# Patient Record
Sex: MALE | Race: BLACK OR AFRICAN AMERICAN | NOT HISPANIC OR LATINO | Employment: UNEMPLOYED | URBAN - METROPOLITAN AREA
[De-identification: names, ages, dates, MRNs, and addresses within clinical notes are randomized per-mention and may not be internally consistent; named-entity substitution may affect disease eponyms.]

---

## 2020-11-24 ENCOUNTER — OFFICE VISIT (OUTPATIENT)
Dept: FAMILY MEDICINE CLINIC | Facility: CLINIC | Age: 50
End: 2020-11-24
Payer: COMMERCIAL

## 2020-11-24 VITALS
WEIGHT: 164 LBS | HEART RATE: 103 BPM | HEIGHT: 63 IN | BODY MASS INDEX: 29.06 KG/M2 | SYSTOLIC BLOOD PRESSURE: 140 MMHG | DIASTOLIC BLOOD PRESSURE: 96 MMHG | OXYGEN SATURATION: 98 % | TEMPERATURE: 97.4 F

## 2020-11-24 DIAGNOSIS — E03.9 HYPOTHYROIDISM, UNSPECIFIED TYPE: ICD-10-CM

## 2020-11-24 DIAGNOSIS — E78.5 HYPERLIPIDEMIA, UNSPECIFIED HYPERLIPIDEMIA TYPE: ICD-10-CM

## 2020-11-24 DIAGNOSIS — I10 ESSENTIAL HYPERTENSION: Primary | ICD-10-CM

## 2020-11-24 DIAGNOSIS — E87.6 POTASSIUM (K) DEFICIENCY: ICD-10-CM

## 2020-11-24 PROCEDURE — 3080F DIAST BP >= 90 MM HG: CPT | Performed by: FAMILY MEDICINE

## 2020-11-24 PROCEDURE — 3077F SYST BP >= 140 MM HG: CPT | Performed by: FAMILY MEDICINE

## 2020-11-24 PROCEDURE — 3008F BODY MASS INDEX DOCD: CPT | Performed by: FAMILY MEDICINE

## 2020-11-24 PROCEDURE — 3725F SCREEN DEPRESSION PERFORMED: CPT | Performed by: FAMILY MEDICINE

## 2020-11-24 PROCEDURE — 1036F TOBACCO NON-USER: CPT | Performed by: FAMILY MEDICINE

## 2020-11-24 PROCEDURE — 99213 OFFICE O/P EST LOW 20 MIN: CPT | Performed by: FAMILY MEDICINE

## 2020-11-24 RX ORDER — POTASSIUM CHLORIDE 20 MEQ/1
20 TABLET, EXTENDED RELEASE ORAL 2 TIMES DAILY
Qty: 90 TABLET | Refills: 1 | Status: SHIPPED | OUTPATIENT
Start: 2020-11-24 | End: 2021-05-25 | Stop reason: SDUPTHER

## 2020-11-24 RX ORDER — AMLODIPINE BESYLATE 5 MG/1
5 TABLET ORAL DAILY
COMMUNITY
End: 2020-11-24 | Stop reason: SDUPTHER

## 2020-11-24 RX ORDER — POTASSIUM CHLORIDE 20 MEQ/1
20 TABLET, EXTENDED RELEASE ORAL 2 TIMES DAILY
COMMUNITY
End: 2020-11-24 | Stop reason: SDUPTHER

## 2020-11-24 RX ORDER — HYDROCHLOROTHIAZIDE 25 MG/1
25 TABLET ORAL DAILY
COMMUNITY
End: 2020-11-24 | Stop reason: SDUPTHER

## 2020-11-24 RX ORDER — LEVOTHYROXINE SODIUM 0.03 MG/1
25 TABLET ORAL DAILY
Qty: 90 TABLET | Refills: 1 | Status: SHIPPED | OUTPATIENT
Start: 2020-11-24 | End: 2021-05-25 | Stop reason: SDUPTHER

## 2020-11-24 RX ORDER — HYDROCHLOROTHIAZIDE 25 MG/1
25 TABLET ORAL DAILY
Qty: 90 TABLET | Refills: 1 | Status: SHIPPED | OUTPATIENT
Start: 2020-11-24 | End: 2021-05-25 | Stop reason: SDUPTHER

## 2020-11-24 RX ORDER — ATORVASTATIN CALCIUM 20 MG/1
20 TABLET, FILM COATED ORAL DAILY
Qty: 90 TABLET | Refills: 1 | Status: SHIPPED | OUTPATIENT
Start: 2020-11-24 | End: 2021-05-25 | Stop reason: SDUPTHER

## 2020-11-24 RX ORDER — AMLODIPINE BESYLATE 5 MG/1
5 TABLET ORAL DAILY
Qty: 90 TABLET | Refills: 1 | Status: SHIPPED | OUTPATIENT
Start: 2020-11-24 | End: 2021-05-25

## 2020-11-24 RX ORDER — ATORVASTATIN CALCIUM 20 MG/1
20 TABLET, FILM COATED ORAL DAILY
COMMUNITY
End: 2020-11-24 | Stop reason: SDUPTHER

## 2020-11-24 RX ORDER — LEVOTHYROXINE SODIUM 0.03 MG/1
25 TABLET ORAL DAILY
COMMUNITY
End: 2020-11-24 | Stop reason: SDUPTHER

## 2021-05-25 ENCOUNTER — OFFICE VISIT (OUTPATIENT)
Dept: FAMILY MEDICINE CLINIC | Facility: CLINIC | Age: 51
End: 2021-05-25
Payer: COMMERCIAL

## 2021-05-25 VITALS
OXYGEN SATURATION: 97 % | BODY MASS INDEX: 27.11 KG/M2 | HEART RATE: 107 BPM | SYSTOLIC BLOOD PRESSURE: 170 MMHG | DIASTOLIC BLOOD PRESSURE: 100 MMHG | WEIGHT: 153 LBS | TEMPERATURE: 97.9 F | HEIGHT: 63 IN

## 2021-05-25 DIAGNOSIS — I10 ESSENTIAL HYPERTENSION: ICD-10-CM

## 2021-05-25 DIAGNOSIS — E78.5 HYPERLIPIDEMIA, UNSPECIFIED HYPERLIPIDEMIA TYPE: ICD-10-CM

## 2021-05-25 DIAGNOSIS — E87.6 POTASSIUM (K) DEFICIENCY: ICD-10-CM

## 2021-05-25 DIAGNOSIS — E03.9 HYPOTHYROIDISM, UNSPECIFIED TYPE: ICD-10-CM

## 2021-05-25 PROCEDURE — 99213 OFFICE O/P EST LOW 20 MIN: CPT | Performed by: FAMILY MEDICINE

## 2021-05-25 RX ORDER — HYDROCHLOROTHIAZIDE 25 MG/1
25 TABLET ORAL DAILY
Qty: 90 TABLET | Refills: 1 | Status: SHIPPED | OUTPATIENT
Start: 2021-05-25 | End: 2021-08-02 | Stop reason: ALTCHOICE

## 2021-05-25 RX ORDER — AMLODIPINE BESYLATE 10 MG/1
10 TABLET ORAL DAILY
Qty: 90 TABLET | Refills: 2 | Status: SHIPPED | OUTPATIENT
Start: 2021-05-25 | End: 2021-08-02 | Stop reason: SDUPTHER

## 2021-05-25 RX ORDER — ATORVASTATIN CALCIUM 20 MG/1
20 TABLET, FILM COATED ORAL DAILY
Qty: 90 TABLET | Refills: 1 | Status: SHIPPED | OUTPATIENT
Start: 2021-05-25 | End: 2021-08-02 | Stop reason: SDUPTHER

## 2021-05-25 RX ORDER — LEVOTHYROXINE SODIUM 0.03 MG/1
25 TABLET ORAL DAILY
Qty: 90 TABLET | Refills: 1 | Status: SHIPPED | OUTPATIENT
Start: 2021-05-25 | End: 2021-08-02 | Stop reason: SDUPTHER

## 2021-05-25 RX ORDER — POTASSIUM CHLORIDE 20 MEQ/1
20 TABLET, EXTENDED RELEASE ORAL 2 TIMES DAILY
Qty: 90 TABLET | Refills: 1 | Status: SHIPPED | OUTPATIENT
Start: 2021-05-25 | End: 2021-08-16

## 2021-05-25 NOTE — ASSESSMENT & PLAN NOTE
Latest Potassium 3 2   Currently taking K+ replacement    - Refill K+  - Recheck BMP    RTC in 2 weeks

## 2021-05-25 NOTE — ASSESSMENT & PLAN NOTE
BP elevated today (170/100)  Reports compliance with medications  Denies any chest pain or blurry vision   Checks BP at home with numbers in the 309T systolic     - Increase norvasc to 10mg daily  - Refill HCTZ 25  - Recommend decreased salt intake  - Keep BP log

## 2021-05-25 NOTE — PROGRESS NOTES
Family Medicine Follow-Up Office Visit  Shwetha Camp 44 y o  male   MRN: [id number] : 1970  ENCOUNTER: 2021 10:26 AM    Assessment and Plan   Essential hypertension  BP elevated today (170/100)  Reports compliance with medications  Denies any chest pain or blurry vision  Checks BP at home with numbers in the 257A systolic     - Increase norvasc to 10mg daily  - Refill HCTZ 25  - Recommend decreased salt intake  - Keep BP log      Hyperlipidemia  Most recent lipid panel WNL except for slight decrease in HDL  Currently taking Lipitor 20mg daily  - Refill Lipitor 20mg  - Recheck lipid panel  - Discussed healthier diet    Hypothyroidism  Latest TSH within normal range  Compliant with levothyroxine 25  - Refill Levothyroxine 25      Potassium (K) deficiency  Latest Potassium 3 2  Currently taking K+ replacement    - Refill K+  - Recheck BMP    RTC in 2 weeks    Nutrition Assessment and Intervention:     Nutrition patient handout reviewed with patient      Physical Activity Assessment and Intervention:      Other interventions: Recommend 150 minutes of moderate intensity exercise weekly      Chief Complaint     Chief Complaint   Patient presents with    Follow-up       History of Present Illness   Shwetha Camp is a 44y o -year-old male who presents today for a follow up  States that he is running out of his medications  He has been feeling well overall, but admits to consuming a high salt diet  He has been checking his blood pressures at home and they have been around 503 systolic  He has no new complaints today  Review of Systems   Review of Systems   Constitutional: Negative for fatigue and fever  HENT: Negative for congestion, rhinorrhea, sneezing and sore throat  Respiratory: Negative for cough, chest tightness, shortness of breath and wheezing  Cardiovascular: Negative for chest pain and palpitations     Gastrointestinal: Negative for abdominal pain, constipation, diarrhea, nausea and vomiting  Genitourinary: Negative for difficulty urinating  Musculoskeletal: Negative for arthralgias, gait problem, myalgias and neck pain  Skin: Negative for rash  Neurological: Negative for dizziness, weakness, numbness and headaches  Active Problem List     Patient Active Problem List   Diagnosis    Essential hypertension    Hyperlipidemia    Hypothyroidism    Potassium (K) deficiency       Past Medical History, Past Surgical History, Family History, and Social History were reviewed and updated today as appropriate  Objective   /100   Pulse (!) 107   Temp 97 9 °F (36 6 °C)   Ht 5' 3" (1 6 m)   Wt 69 4 kg (153 lb)   SpO2 97%   BMI 27 10 kg/m²     Physical Exam  Vitals signs reviewed  Constitutional:       General: He is not in acute distress  Appearance: He is well-developed  He is not toxic-appearing or diaphoretic  HENT:      Head: Normocephalic and atraumatic  Eyes:      General: No scleral icterus  Right eye: No discharge  Left eye: No discharge  Conjunctiva/sclera: Conjunctivae normal    Cardiovascular:      Rate and Rhythm: Normal rate and regular rhythm  Heart sounds: Normal heart sounds  No murmur  Pulmonary:      Effort: Pulmonary effort is normal  No respiratory distress  Breath sounds: Normal breath sounds  No wheezing  Abdominal:      General: Bowel sounds are normal  There is no distension  Palpations: Abdomen is soft  Tenderness: There is no abdominal tenderness  Musculoskeletal: Normal range of motion  General: No tenderness  Skin:     General: Skin is warm  Findings: No erythema or rash  Neurological:      Mental Status: He is alert     Psychiatric:         Behavior: Behavior normal          Pertinent Laboratory/Diagnostic Studies:  No results found for: GLUCOSE, BUN, CREATININE, CALCIUM, NA, K, CO2, CL  No results found for: ALT, AST, GGT, ALKPHOS, BILITOT    No results found for: WBC, HGB, HCT, MCV, PLT    No results found for: TSH    No results found for: CHOL  No results found for: TRIG  No results found for: HDL  No results found for: LDLCALC  No results found for: HGBA1C    No results found for this or any previous visit  Orders Placed This Encounter   Procedures    Basic metabolic panel    Lipid Panel with Direct LDL reflex         Current Medications     Current Outpatient Medications   Medication Sig Dispense Refill    amLODIPine (NORVASC) 10 mg tablet Take 1 tablet (10 mg total) by mouth daily 90 tablet 2    atorvastatin (LIPITOR) 20 mg tablet Take 1 tablet (20 mg total) by mouth daily 90 tablet 1    hydrochlorothiazide (HYDRODIURIL) 25 mg tablet Take 1 tablet (25 mg total) by mouth daily 90 tablet 1    levothyroxine 25 mcg tablet Take 1 tablet (25 mcg total) by mouth daily 90 tablet 1    potassium chloride (K-DUR,KLOR-CON) 20 mEq tablet Take 1 tablet (20 mEq total) by mouth 2 (two) times a day 90 tablet 1     No current facility-administered medications for this visit          ALLERGIES:  No Known Allergies    Health Maintenance     Health Maintenance   Topic Date Due    COVID-19 Vaccine (1) Never done    HIV Screening  Never done    BMI: Followup Plan  Never done    Annual Physical  Never done    DTaP,Tdap,and Td Vaccines (1 - Tdap) Never done    Colorectal Cancer Screening  Never done    Depression Screening PHQ  11/24/2021    BMI: Adult  11/24/2021    Influenza Vaccine (Season Ended) 09/01/2021    Pneumococcal Vaccine: Pediatrics (0 to 5 Years) and At-Risk Patients (6 to 59 Years)  Aged Out    HIB Vaccine  Aged Out    Hepatitis B Vaccine  Aged Out    IPV Vaccine  Aged Out    Hepatitis A Vaccine  Aged Out    Meningococcal ACWY Vaccine  Aged Out    HPV Vaccine  Aged [employer] History   Administered Date(s) Administered    INFLUENZA 01/22/2018, 01/29/2019         Adam Swift MD   [address]  5/25/2021  10:26 AM    Parts of this note were dictated using M*[employer] dictation software and may have sounds-like errors due to variation in pronunciation

## 2021-05-25 NOTE — ASSESSMENT & PLAN NOTE
Most recent lipid panel WNL except for slight decrease in HDL  Currently taking Lipitor 20mg daily      - Refill Lipitor 20mg  - Recheck lipid panel  - Discussed healthier diet

## 2021-06-04 ENCOUNTER — LAB (OUTPATIENT)
Dept: LAB | Facility: CLINIC | Age: 51
End: 2021-06-04
Payer: COMMERCIAL

## 2021-06-04 DIAGNOSIS — I10 ESSENTIAL HYPERTENSION: ICD-10-CM

## 2021-06-04 DIAGNOSIS — E78.5 HYPERLIPIDEMIA, UNSPECIFIED HYPERLIPIDEMIA TYPE: ICD-10-CM

## 2021-06-04 DIAGNOSIS — E03.9 HYPOTHYROIDISM, UNSPECIFIED TYPE: ICD-10-CM

## 2021-06-04 LAB
ANION GAP SERPL CALCULATED.3IONS-SCNC: 7 MMOL/L (ref 4–13)
BUN SERPL-MCNC: 15 MG/DL (ref 5–25)
CALCIUM SERPL-MCNC: 9.7 MG/DL (ref 8.3–10.1)
CHLORIDE SERPL-SCNC: 102 MMOL/L (ref 100–108)
CHOLEST SERPL-MCNC: 117 MG/DL (ref 50–200)
CO2 SERPL-SCNC: 33 MMOL/L (ref 21–32)
CREAT SERPL-MCNC: 1.08 MG/DL (ref 0.6–1.3)
GFR SERPL CREATININE-BSD FRML MDRD: 91 ML/MIN/1.73SQ M
GLUCOSE P FAST SERPL-MCNC: 104 MG/DL (ref 65–99)
HDLC SERPL-MCNC: 42 MG/DL
LDLC SERPL CALC-MCNC: 62 MG/DL (ref 0–100)
POTASSIUM SERPL-SCNC: 2.9 MMOL/L (ref 3.5–5.3)
SODIUM SERPL-SCNC: 142 MMOL/L (ref 136–145)
TRIGL SERPL-MCNC: 63 MG/DL
TSH SERPL DL<=0.05 MIU/L-ACNC: 1.68 UIU/ML (ref 0.36–3.74)

## 2021-06-04 PROCEDURE — 80061 LIPID PANEL: CPT

## 2021-06-04 PROCEDURE — 36415 COLL VENOUS BLD VENIPUNCTURE: CPT

## 2021-06-04 PROCEDURE — 84443 ASSAY THYROID STIM HORMONE: CPT

## 2021-06-04 PROCEDURE — 80048 BASIC METABOLIC PNL TOTAL CA: CPT

## 2021-06-08 ENCOUNTER — OFFICE VISIT (OUTPATIENT)
Dept: FAMILY MEDICINE CLINIC | Facility: CLINIC | Age: 51
End: 2021-06-08
Payer: COMMERCIAL

## 2021-06-08 VITALS
SYSTOLIC BLOOD PRESSURE: 156 MMHG | WEIGHT: 148 LBS | OXYGEN SATURATION: 97 % | HEART RATE: 122 BPM | BODY MASS INDEX: 26.22 KG/M2 | TEMPERATURE: 97.4 F | DIASTOLIC BLOOD PRESSURE: 78 MMHG | HEIGHT: 63 IN

## 2021-06-08 DIAGNOSIS — I10 ESSENTIAL HYPERTENSION: Primary | ICD-10-CM

## 2021-06-08 DIAGNOSIS — J45.20 MILD INTERMITTENT ASTHMA WITHOUT COMPLICATION: ICD-10-CM

## 2021-06-08 DIAGNOSIS — E87.6 POTASSIUM (K) DEFICIENCY: ICD-10-CM

## 2021-06-08 DIAGNOSIS — J30.1 ALLERGIC RHINITIS DUE TO POLLEN, UNSPECIFIED SEASONALITY: ICD-10-CM

## 2021-06-08 PROCEDURE — 99213 OFFICE O/P EST LOW 20 MIN: CPT | Performed by: FAMILY MEDICINE

## 2021-06-08 RX ORDER — FLUTICASONE PROPIONATE 50 MCG
1 SPRAY, SUSPENSION (ML) NASAL DAILY
Qty: 9.9 ML | Refills: 1 | Status: SHIPPED | OUTPATIENT
Start: 2021-06-08 | End: 2021-08-02 | Stop reason: SDUPTHER

## 2021-06-08 RX ORDER — LISINOPRIL 10 MG/1
10 TABLET ORAL DAILY
Qty: 30 TABLET | Refills: 0 | Status: SHIPPED | OUTPATIENT
Start: 2021-06-08 | End: 2021-08-02

## 2021-06-08 RX ORDER — ALBUTEROL SULFATE 90 UG/1
2 AEROSOL, METERED RESPIRATORY (INHALATION) EVERY 6 HOURS PRN
Qty: 6.7 G | Refills: 1 | Status: SHIPPED | OUTPATIENT
Start: 2021-06-08 | End: 2021-08-02 | Stop reason: SDUPTHER

## 2021-06-08 NOTE — ASSESSMENT & PLAN NOTE
Patient has a history of mild intermittent asthma  Albuterol use 1-2x per week  Denies nighttime use      - Refill Albuterol inhaler for PRN use    Patient to call clinic in 1 week to discuss K+ labs and home BP levels

## 2021-06-08 NOTE — PROGRESS NOTES
Family Medicine Follow-Up Office Visit  Britt Spence 44 y o  male   MRN: [id number] : 1970  ENCOUNTER: 2021 11:11 AM    Assessment and Plan   Essential hypertension  BP elevated today (156/78)  Continuously elevated despite dual therapy  Patient denies any blurry vision or headaches  - D/C HCTZ due to hypokalemia  - Order Lisinopril 10mg daily  - Continue norvasc 10mg  - Continue low salt diet  - Continue home BP checks  - Call in 1 week to discuss home BP readings    Potassium (K) deficiency  Potassium level 2 9 on most recent labs despite 20mg BID K+ supplementation  Patient denies any muscle weakness or cardiac issues  Possibly related to HCTZ use  - D/C HCTZ  - Will replace with lisinopril 10mg  - Continue K+ 20mg BID  - Recheck K+ level in 1 week    Allergic rhinitis due to pollen  History of allergic rhinitis, controlled with flonase  Mostly triggered by pollen and smoke  - Order Flonase    Mild intermittent asthma without complication  Patient has a history of mild intermittent asthma  Albuterol use 1-2x per week  Denies nighttime use  - Refill Albuterol inhaler for PRN use    Patient to call clinic in 1 week to discuss K+ labs and home BP levels      Chief Complaint     Chief Complaint   Patient presents with    Follow-up       History of Present Illness   Britt Spence is a 44y o -year-old male who presents today for a follow up  He had bloodwork completed about 2 weeks ago  Found to have decreased potassium  Today his BP remains elevated  Denies any symptoms  He reports a history of allergic rhinitis and asthma  Review of Systems   Review of Systems   Constitutional: Negative for fatigue and fever  HENT: Negative for congestion, rhinorrhea, sneezing and sore throat  Respiratory: Negative for cough, chest tightness, shortness of breath and wheezing  Cardiovascular: Negative for chest pain and palpitations     Gastrointestinal: Negative for abdominal pain, constipation, diarrhea, nausea and vomiting  Genitourinary: Negative for difficulty urinating  Musculoskeletal: Negative for arthralgias, gait problem, myalgias and neck pain  Skin: Negative for rash  Neurological: Negative for dizziness, weakness, numbness and headaches  Active Problem List     Patient Active Problem List   Diagnosis    Essential hypertension    Hyperlipidemia    Hypothyroidism    Potassium (K) deficiency    Allergic rhinitis due to pollen    Mild intermittent asthma without complication       Past Medical History, Past Surgical History, Family History, and Social History were reviewed and updated today as appropriate  Objective   /78   Pulse (!) 122   Temp (!) 97 4 °F (36 3 °C)   Ht 5' 3" (1 6 m)   Wt 67 1 kg (148 lb)   SpO2 97%   BMI 26 22 kg/m²     Physical Exam  Vitals signs reviewed  Constitutional:       General: He is not in acute distress  Appearance: He is well-developed  He is not toxic-appearing or diaphoretic  HENT:      Head: Normocephalic and atraumatic  Eyes:      General: No scleral icterus  Right eye: No discharge  Left eye: No discharge  Conjunctiva/sclera: Conjunctivae normal    Cardiovascular:      Rate and Rhythm: Normal rate and regular rhythm  Heart sounds: Normal heart sounds  No murmur  Pulmonary:      Effort: Pulmonary effort is normal  No respiratory distress  Breath sounds: Normal breath sounds  No wheezing  Abdominal:      General: Bowel sounds are normal  There is no distension  Palpations: Abdomen is soft  Tenderness: There is no abdominal tenderness  Musculoskeletal: Normal range of motion  General: No tenderness  Skin:     General: Skin is warm  Findings: No erythema or rash  Neurological:      Mental Status: He is alert     Psychiatric:         Behavior: Behavior normal           Pertinent Laboratory/Diagnostic Studies:  Lab Results   Component Value Date    BUN 15 06/04/2021    CREATININE 1 08 06/04/2021    CALCIUM 9 7 06/04/2021    K 2 9 (L) 06/04/2021    CO2 33 (H) 06/04/2021     06/04/2021     No results found for: ALT, AST, GGT, ALKPHOS, BILITOT    No results found for: WBC, HGB, HCT, MCV, PLT    No results found for: TSH    No results found for: CHOL  Lab Results   Component Value Date    TRIG 63 06/04/2021     Lab Results   Component Value Date    HDL 42 06/04/2021     Lab Results   Component Value Date    LDLCALC 62 06/04/2021     No results found for: HGBA1C    Results for orders placed or performed in visit on 53/23/19   Basic metabolic panel   Result Value Ref Range    Sodium 142 136 - 145 mmol/L    Potassium 2 9 (L) 3 5 - 5 3 mmol/L    Chloride 102 100 - 108 mmol/L    CO2 33 (H) 21 - 32 mmol/L    ANION GAP 7 4 - 13 mmol/L    BUN 15 5 - 25 mg/dL    Creatinine 1 08 0 60 - 1 30 mg/dL    Glucose, Fasting 104 (H) 65 - 99 mg/dL    Calcium 9 7 8 3 - 10 1 mg/dL    eGFR 91 ml/min/1 73sq m   Lipid Panel with Direct LDL reflex   Result Value Ref Range    Cholesterol 117 50 - 200 mg/dL    Triglycerides 63 <=150 mg/dL    HDL, Direct 42 >=40 mg/dL    LDL Calculated 62 0 - 100 mg/dL   TSH, 3rd generation with Free T4 reflex   Result Value Ref Range    TSH 3RD GENERATON 1 680 0 358 - 3 740 uIU/mL       Orders Placed This Encounter   Procedures    Potassium         Current Medications     Current Outpatient Medications   Medication Sig Dispense Refill    amLODIPine (NORVASC) 10 mg tablet Take 1 tablet (10 mg total) by mouth daily 90 tablet 2    atorvastatin (LIPITOR) 20 mg tablet Take 1 tablet (20 mg total) by mouth daily 90 tablet 1    hydrochlorothiazide (HYDRODIURIL) 25 mg tablet Take 1 tablet (25 mg total) by mouth daily 90 tablet 1    levothyroxine 25 mcg tablet Take 1 tablet (25 mcg total) by mouth daily 90 tablet 1    potassium chloride (K-DUR,KLOR-CON) 20 mEq tablet Take 1 tablet (20 mEq total) by mouth 2 (two) times a day 90 tablet 1    albuterol (PROVENTIL HFA,VENTOLIN HFA) 90 mcg/act inhaler Inhale 2 puffs every 6 (six) hours as needed for wheezing or shortness of breath 6 7 g 1    fluticasone (FLONASE) 50 mcg/act nasal spray 1 spray into each nostril daily 9 9 mL 1    lisinopril (ZESTRIL) 10 mg tablet Take 1 tablet (10 mg total) by mouth daily 30 tablet 0     No current facility-administered medications for this visit  ALLERGIES:  No Known Allergies    Health Maintenance     Health Maintenance   Topic Date Due    Pneumococcal Vaccine: Pediatrics (0 to 5 Years) and At-Risk Patients (6 to 59 Years) (1 of 1 - PPSV23) Never done    COVID-19 Vaccine (1) Never done    HIV Screening  Never done    BMI: Followup Plan  Never done    Annual Physical  Never done    DTaP,Tdap,and Td Vaccines (1 - Tdap) Never done    Colorectal Cancer Screening  Never done    Influenza Vaccine (Season Ended) 09/01/2021    Depression Screening PHQ  11/24/2021    BMI: Adult  06/08/2022    HIB Vaccine  Aged Out    Hepatitis B Vaccine  Aged Out    IPV Vaccine  Aged Out    Hepatitis A Vaccine  Aged Out    Meningococcal ACWY Vaccine  Aged Out    HPV Vaccine  Aged [employer] History   Administered Date(s) Administered    INFLUENZA 01/22/2018, 01/29/2019         Kathy Velazco MD   [address]  6/8/2021  11:11 AM    Parts of this note were dictated using [employer] dictation software and may have sounds-like errors due to variation in pronunciation

## 2021-06-08 NOTE — ASSESSMENT & PLAN NOTE
BP elevated today (156/78)  Continuously elevated despite dual therapy  Patient denies any blurry vision or headaches      - D/C HCTZ due to hypokalemia  - Order Lisinopril 10mg daily  - Continue norvasc 10mg  - Continue low salt diet  - Continue home BP checks  - Call in 1 week to discuss home BP readings

## 2021-06-08 NOTE — ASSESSMENT & PLAN NOTE
History of allergic rhinitis, controlled with flonase  Mostly triggered by pollen and smoke      - Order Flonase

## 2021-06-08 NOTE — ASSESSMENT & PLAN NOTE
Potassium level 2 9 on most recent labs despite 20mg BID K+ supplementation  Patient denies any muscle weakness or cardiac issues  Possibly related to HCTZ use      - D/C HCTZ  - Will replace with lisinopril 10mg  - Continue K+ 20mg BID  - Recheck K+ level in 1 week

## 2021-06-14 ENCOUNTER — APPOINTMENT (OUTPATIENT)
Dept: LAB | Facility: CLINIC | Age: 51
End: 2021-06-14
Payer: COMMERCIAL

## 2021-06-14 DIAGNOSIS — E87.6 POTASSIUM (K) DEFICIENCY: ICD-10-CM

## 2021-06-14 LAB — POTASSIUM SERPL-SCNC: 3 MMOL/L (ref 3.5–5.3)

## 2021-06-14 PROCEDURE — 36415 COLL VENOUS BLD VENIPUNCTURE: CPT

## 2021-06-14 PROCEDURE — 84132 ASSAY OF SERUM POTASSIUM: CPT

## 2021-06-18 ENCOUNTER — OFFICE VISIT (OUTPATIENT)
Dept: FAMILY MEDICINE CLINIC | Facility: CLINIC | Age: 51
End: 2021-06-18
Payer: COMMERCIAL

## 2021-06-18 VITALS
WEIGHT: 150 LBS | HEART RATE: 101 BPM | TEMPERATURE: 98.4 F | OXYGEN SATURATION: 100 % | SYSTOLIC BLOOD PRESSURE: 140 MMHG | DIASTOLIC BLOOD PRESSURE: 80 MMHG | BODY MASS INDEX: 26.58 KG/M2 | HEIGHT: 63 IN

## 2021-06-18 DIAGNOSIS — M79.10 MUSCLE ACHE: ICD-10-CM

## 2021-06-18 DIAGNOSIS — I10 ESSENTIAL HYPERTENSION: Primary | ICD-10-CM

## 2021-06-18 DIAGNOSIS — E87.6 POTASSIUM (K) DEFICIENCY: ICD-10-CM

## 2021-06-18 PROCEDURE — 99213 OFFICE O/P EST LOW 20 MIN: CPT | Performed by: FAMILY MEDICINE

## 2021-06-18 PROCEDURE — 3008F BODY MASS INDEX DOCD: CPT | Performed by: FAMILY MEDICINE

## 2021-06-18 RX ORDER — HYDROCORTISONE 0.5 %
CREAM (GRAM) TOPICAL
Qty: 30 G | Refills: 0 | Status: SHIPPED | OUTPATIENT
Start: 2021-06-18

## 2021-06-19 PROBLEM — M79.10 MUSCLE ACHE: Status: ACTIVE | Noted: 2021-06-19

## 2021-06-19 NOTE — ASSESSMENT & PLAN NOTE
Patient with tenderness over right trapezius and bilateral calves  Reports helping his friends move furniture recently  Denies any trauma  Potassium deficiency most likely playing a role in patients muscle aches      - Recommend continued stretching  - Continue Potassium 20mg BID  - Recommend daily multivitamin  - Order Bengay PRN applied to affected areas    RTC in 2 weeks for follow up

## 2021-06-19 NOTE — ASSESSMENT & PLAN NOTE
Previous K+ level 2 9  On recheck 1 week after HCTZ discontinued 3 0  Patient has been taking potassium 20mg BID      - Will continue potassium at 20mg BID and consider recheck in 2 weeks  - Refrain from HCTZ use

## 2021-06-19 NOTE — PROGRESS NOTES
Family Medicine Follow-Up Office Visit  Angelika Sherman 44 y o  male   MRN: [id number] : 1970  ENCOUNTER: 2021 12:54 PM    Assessment and Plan   Essential hypertension  BP improved today 140/80  Patient has been compliant with lisinopril and norvasc  Denies any chest pain, blurry vision, or headaches  He does check his BP at home reporting similar numbers as todays BP check  - Continue lisinopril 10mg  - Continue norvasc 10mg  - Continue to check BP at home    Potassium (K) deficiency  Previous K+ level 2 9  On recheck 1 week after HCTZ discontinued 3 0  Patient has been taking potassium 20mg BID  - Will continue potassium at 20mg BID and consider recheck in 2 weeks  - Refrain from HCTZ use    Muscle ache  Patient with tenderness over right trapezius and bilateral calves  Reports helping his friends move furniture recently  Denies any trauma  Potassium deficiency most likely playing a role in patients muscle aches  - Recommend continued stretching  - Continue Potassium 20mg BID  - Recommend daily multivitamin  - Order Bengay PRN applied to affected areas    RTC in 2 weeks for follow up      Chief Complaint     Chief Complaint   Patient presents with    Follow-up       History of Present Illness   Angelika Sherman is a 44y o -year-old male who presents today for a follow up on hypertension and hypokalemia  His K+ was noted to be 2 9 previously and was placed on potassium supplementation  He was on HCTZ at that time  HCTZ was discontinued and lisinopril was started along with his usual norvasc  Patient was asked to have potassium rechecked 1 week after his HCTZ was discontinued  Today he complains of generalized muscle aches after helping his friends move into a new house  Mostly in his shoulders and calves  He has no other complaints today  Review of Systems   Review of Systems   Constitutional: Negative for fatigue and fever  HENT: Negative for congestion, rhinorrhea, sneezing and sore throat  Respiratory: Negative for cough, chest tightness, shortness of breath and wheezing  Cardiovascular: Negative for chest pain and palpitations  Gastrointestinal: Negative for abdominal pain, constipation, diarrhea, nausea and vomiting  Genitourinary: Negative for difficulty urinating  Musculoskeletal: Positive for myalgias  Negative for arthralgias, gait problem and neck pain  Skin: Negative for rash  Neurological: Negative for dizziness, weakness, numbness and headaches  Active Problem List     Patient Active Problem List   Diagnosis    Essential hypertension    Hyperlipidemia    Hypothyroidism    Potassium (K) deficiency    Allergic rhinitis due to pollen    Mild intermittent asthma without complication    Muscle ache       Past Medical History, Past Surgical History, Family History, and Social History were reviewed and updated today as appropriate  Objective   /80   Pulse 101   Temp 98 4 °F (36 9 °C)   Ht 5' 3" (1 6 m)   Wt 68 kg (150 lb)   SpO2 100%   BMI 26 57 kg/m²     Physical Exam  Vitals reviewed  Constitutional:       General: He is not in acute distress  Appearance: He is well-developed  He is not toxic-appearing or diaphoretic  HENT:      Head: Normocephalic and atraumatic  Eyes:      General: No scleral icterus  Right eye: No discharge  Left eye: No discharge  Conjunctiva/sclera: Conjunctivae normal    Cardiovascular:      Rate and Rhythm: Normal rate and regular rhythm  Heart sounds: Normal heart sounds  No murmur heard  Pulmonary:      Effort: Pulmonary effort is normal  No respiratory distress  Breath sounds: Normal breath sounds  No wheezing  Abdominal:      General: Bowel sounds are normal  There is no distension  Palpations: Abdomen is soft  Tenderness: There is no abdominal tenderness  Musculoskeletal:         General: Tenderness (over right trapezius and bilateral calves) present   Normal range of motion  Skin:     General: Skin is warm  Findings: No erythema or rash  Neurological:      Mental Status: He is alert  Psychiatric:         Behavior: Behavior normal            Pertinent Laboratory/Diagnostic Studies:  Lab Results   Component Value Date    BUN 15 06/04/2021    CREATININE 1 08 06/04/2021    CALCIUM 9 7 06/04/2021    K 3 0 (L) 06/14/2021    CO2 33 (H) 06/04/2021     06/04/2021     No results found for: ALT, AST, GGT, ALKPHOS, BILITOT    No results found for: WBC, HGB, HCT, MCV, PLT    No results found for: TSH    No results found for: CHOL  Lab Results   Component Value Date    TRIG 63 06/04/2021     Lab Results   Component Value Date    HDL 42 06/04/2021     Lab Results   Component Value Date    LDLCALC 62 06/04/2021     No results found for: HGBA1C    Results for orders placed or performed in visit on 06/14/21   Potassium   Result Value Ref Range    Potassium 3 0 (L) 3 5 - 5 3 mmol/L       No orders of the defined types were placed in this encounter          Current Medications     Current Outpatient Medications   Medication Sig Dispense Refill    albuterol (PROVENTIL HFA,VENTOLIN HFA) 90 mcg/act inhaler Inhale 2 puffs every 6 (six) hours as needed for wheezing or shortness of breath 6 7 g 1    amLODIPine (NORVASC) 10 mg tablet Take 1 tablet (10 mg total) by mouth daily 90 tablet 2    atorvastatin (LIPITOR) 20 mg tablet Take 1 tablet (20 mg total) by mouth daily 90 tablet 1    fluticasone (FLONASE) 50 mcg/act nasal spray 1 spray into each nostril daily 9 9 mL 1    hydrochlorothiazide (HYDRODIURIL) 25 mg tablet Take 1 tablet (25 mg total) by mouth daily 90 tablet 1    levothyroxine 25 mcg tablet Take 1 tablet (25 mcg total) by mouth daily 90 tablet 1    lisinopril (ZESTRIL) 10 mg tablet Take 1 tablet (10 mg total) by mouth daily 30 tablet 0    Menthol-Methyl Salicylate (ZACH NESS GREASELESS) 10-15 % greaseless cream Apply topically daily at bedtime 30 g 0    potassium chloride (K-MISHA,KLOR-CON) 20 mEq tablet Take 1 tablet (20 mEq total) by mouth 2 (two) times a day 90 tablet 1     No current facility-administered medications for this visit  ALLERGIES:  No Known Allergies    Health Maintenance     Health Maintenance   Topic Date Due    Hepatitis C Screening  Never done    Pneumococcal Vaccine: Pediatrics (0 to 5 Years) and At-Risk Patients (6 to 59 Years) (1 of 2 - PPSV23) Never done    COVID-19 Vaccine (1) Never done    HIV Screening  Never done    BMI: Followup Plan  Never done    Annual Physical  Never done    DTaP,Tdap,and Td Vaccines (1 - Tdap) Never done    Colorectal Cancer Screening  Never done    Influenza Vaccine (Season Ended) 09/01/2021    Depression Screening PHQ  11/24/2021    BMI: Adult  06/18/2022    HIB Vaccine  Aged Out    Hepatitis B Vaccine  Aged Out    IPV Vaccine  Aged Out    Hepatitis A Vaccine  Aged Out    Meningococcal ACWY Vaccine  Aged Out    HPV Vaccine  Aged [employer] History   Administered Date(s) Administered    INFLUENZA 01/22/2018, 01/29/2019         Adam Swift MD   [address]  6/19/2021  12:54 PM    Parts of this note were dictated using [employer] dictation software and may have sounds-like errors due to variation in pronunciation

## 2021-06-19 NOTE — ASSESSMENT & PLAN NOTE
BP improved today 140/80  Patient has been compliant with lisinopril and norvasc  Denies any chest pain, blurry vision, or headaches  He does check his BP at home reporting similar numbers as todays BP check      - Continue lisinopril 10mg  - Continue norvasc 10mg  - Continue to check BP at home

## 2021-06-29 ENCOUNTER — OFFICE VISIT (OUTPATIENT)
Dept: FAMILY MEDICINE CLINIC | Facility: CLINIC | Age: 51
End: 2021-06-29
Payer: COMMERCIAL

## 2021-06-29 VITALS
HEART RATE: 96 BPM | HEIGHT: 63 IN | WEIGHT: 150.8 LBS | DIASTOLIC BLOOD PRESSURE: 62 MMHG | SYSTOLIC BLOOD PRESSURE: 110 MMHG | OXYGEN SATURATION: 98 % | BODY MASS INDEX: 26.72 KG/M2 | TEMPERATURE: 97.7 F | RESPIRATION RATE: 18 BRPM

## 2021-06-29 DIAGNOSIS — M79.10 MUSCLE ACHE: ICD-10-CM

## 2021-06-29 DIAGNOSIS — E87.6 POTASSIUM (K) DEFICIENCY: ICD-10-CM

## 2021-06-29 DIAGNOSIS — I10 ESSENTIAL HYPERTENSION: Primary | ICD-10-CM

## 2021-06-29 PROCEDURE — 3074F SYST BP LT 130 MM HG: CPT | Performed by: FAMILY MEDICINE

## 2021-06-29 PROCEDURE — 99213 OFFICE O/P EST LOW 20 MIN: CPT | Performed by: FAMILY MEDICINE

## 2021-06-29 PROCEDURE — 1036F TOBACCO NON-USER: CPT | Performed by: FAMILY MEDICINE

## 2021-06-29 PROCEDURE — 3078F DIAST BP <80 MM HG: CPT | Performed by: FAMILY MEDICINE

## 2021-06-29 PROCEDURE — 3008F BODY MASS INDEX DOCD: CPT | Performed by: FAMILY MEDICINE

## 2021-06-29 NOTE — ASSESSMENT & PLAN NOTE
BP well controlled today 110/62  Patient is compliant with norvasc and lisinopril  He denies any complaints today      - Continue current regimen  - Continue BP checks at home

## 2021-06-29 NOTE — ASSESSMENT & PLAN NOTE
Muscle aches have greatly improved with bengay and potassium supplementation     - Continue Bengay PRN  - Continue potassium 20mg BID    Follow up in 4 weeks

## 2021-06-29 NOTE — ASSESSMENT & PLAN NOTE
K+ 3 0 on 6/14  2 9 previous  Denies any symptoms except for muscle aches   Has been compliant with his potassium 20mg BID     - Continue potassium at current dosing  - Recheck potassium in 4 weeks, prior to next follow up

## 2021-06-29 NOTE — PROGRESS NOTES
Family Medicine Follow-Up Office Visit  Horacio Garner 44 y o  male   MRN: [id number] : 1970  ENCOUNTER: 2021 1:28 PM    Assessment and Plan   Essential hypertension  BP well controlled today 110/62  Patient is compliant with norvasc and lisinopril  He denies any complaints today  - Continue current regimen  - Continue BP checks at home    Potassium (K) deficiency  K+ 3 0 on   2 9 previous  Denies any symptoms except for muscle aches  Has been compliant with his potassium 20mg BID     - Continue potassium at current dosing  - Recheck potassium in 4 weeks, prior to next follow up    Muscle ache  Muscle aches have greatly improved with bengay and potassium supplementation     - Continue Bengay PRN  - Continue potassium 20mg BID    Follow up in 4 weeks      Chief Complaint     Chief Complaint   Patient presents with    Follow-up       History of Present Illness   Horacio Garner is a 44y o -year-old male who presents today for a follow up  Patient states that he has been doing well  He is compliant with his medications  He reports today that most of his muscle aches have improved since using bengay and taking potassium supplementation  He has no new complaints today  Review of Systems   Review of Systems   Constitutional: Negative for fatigue and fever  HENT: Negative for congestion, rhinorrhea, sneezing and sore throat  Respiratory: Negative for cough, chest tightness, shortness of breath and wheezing  Cardiovascular: Negative for chest pain and palpitations  Gastrointestinal: Negative for abdominal pain, constipation, diarrhea, nausea and vomiting  Genitourinary: Negative for difficulty urinating  Musculoskeletal: Negative for arthralgias, gait problem, myalgias and neck pain  Skin: Negative for rash  Neurological: Negative for dizziness, weakness, numbness and headaches         Active Problem List     Patient Active Problem List   Diagnosis    Essential hypertension    Hyperlipidemia    Hypothyroidism    Potassium (K) deficiency    Allergic rhinitis due to pollen    Mild intermittent asthma without complication    Muscle ache       Past Medical History, Past Surgical History, Family History, and Social History were reviewed and updated today as appropriate  Objective   /62 (BP Location: Left arm, Patient Position: Sitting, Cuff Size: Adult)   Pulse 96   Temp 97 7 °F (36 5 °C) (Tympanic)   Resp 18   Ht 5' 3" (1 6 m)   Wt 68 4 kg (150 lb 12 8 oz)   SpO2 98%   BMI 26 71 kg/m²     Physical Exam  Vitals reviewed  Constitutional:       General: He is not in acute distress  Appearance: He is well-developed  He is not toxic-appearing or diaphoretic  HENT:      Head: Normocephalic and atraumatic  Eyes:      General: No scleral icterus  Right eye: No discharge  Left eye: No discharge  Conjunctiva/sclera: Conjunctivae normal    Cardiovascular:      Rate and Rhythm: Normal rate and regular rhythm  Heart sounds: Normal heart sounds  No murmur heard  Pulmonary:      Effort: Pulmonary effort is normal  No respiratory distress  Breath sounds: Normal breath sounds  No wheezing  Abdominal:      General: Bowel sounds are normal  There is no distension  Palpations: Abdomen is soft  Tenderness: There is no abdominal tenderness  Musculoskeletal:         General: No tenderness  Normal range of motion  Skin:     General: Skin is warm  Findings: No erythema or rash  Neurological:      Mental Status: He is alert     Psychiatric:         Behavior: Behavior normal        Pertinent Laboratory/Diagnostic Studies:  Lab Results   Component Value Date    BUN 15 06/04/2021    CREATININE 1 08 06/04/2021    CALCIUM 9 7 06/04/2021    K 3 0 (L) 06/14/2021    CO2 33 (H) 06/04/2021     06/04/2021     No results found for: ALT, AST, GGT, ALKPHOS, BILITOT    No results found for: WBC, HGB, HCT, MCV, PLT    No results found for: TSH    No results found for: CHOL  Lab Results   Component Value Date    TRIG 63 06/04/2021     Lab Results   Component Value Date    HDL 42 06/04/2021     Lab Results   Component Value Date    LDLCALC 62 06/04/2021     No results found for: HGBA1C    Results for orders placed or performed in visit on 06/14/21   Potassium   Result Value Ref Range    Potassium 3 0 (L) 3 5 - 5 3 mmol/L       Orders Placed This Encounter   Procedures    Potassium         Current Medications     Current Outpatient Medications   Medication Sig Dispense Refill    albuterol (PROVENTIL HFA,VENTOLIN HFA) 90 mcg/act inhaler Inhale 2 puffs every 6 (six) hours as needed for wheezing or shortness of breath 6 7 g 1    amLODIPine (NORVASC) 10 mg tablet Take 1 tablet (10 mg total) by mouth daily 90 tablet 2    atorvastatin (LIPITOR) 20 mg tablet Take 1 tablet (20 mg total) by mouth daily 90 tablet 1    fluticasone (FLONASE) 50 mcg/act nasal spray 1 spray into each nostril daily 9 9 mL 1    hydrochlorothiazide (HYDRODIURIL) 25 mg tablet Take 1 tablet (25 mg total) by mouth daily 90 tablet 1    levothyroxine 25 mcg tablet Take 1 tablet (25 mcg total) by mouth daily 90 tablet 1    lisinopril (ZESTRIL) 10 mg tablet Take 1 tablet (10 mg total) by mouth daily 30 tablet 0    Menthol-Methyl Salicylate (ZACH NESS GREASELESS) 10-15 % greaseless cream Apply topically daily at bedtime 30 g 0    potassium chloride (K-DUR,KLOR-CON) 20 mEq tablet Take 1 tablet (20 mEq total) by mouth 2 (two) times a day 90 tablet 1     No current facility-administered medications for this visit         ALLERGIES:  No Known Allergies    Health Maintenance     Health Maintenance   Topic Date Due    Hepatitis C Screening  Never done    Pneumococcal Vaccine: Pediatrics (0 to 5 Years) and At-Risk Patients (6 to 59 Years) (1 of 2 - PPSV23) Never done    COVID-19 Vaccine (1) Never done    HIV Screening  Never done    BMI: Followup Plan  Never done    Annual Physical  Never done    DTaP,Tdap,and Td Vaccines (1 - Tdap) Never done    Colorectal Cancer Screening  Never done    Influenza Vaccine (Season Ended) 09/01/2021    Depression Screening PHQ  11/24/2021    BMI: Adult  06/29/2022    HIB Vaccine  Aged Out    Hepatitis B Vaccine  Aged Out    IPV Vaccine  Aged Out    Hepatitis A Vaccine  Aged Out    Meningococcal ACWY Vaccine  Aged Out    HPV Vaccine  Aged [employer] History   Administered Date(s) Administered    INFLUENZA 01/22/2018, 01/29/2019         Karen Mckeon MD   [address]  6/29/2021  1:28 PM    Parts of this note were dictated using [employer] dictation software and may have sounds-like errors due to variation in pronunciation

## 2021-07-28 ENCOUNTER — APPOINTMENT (OUTPATIENT)
Dept: LAB | Facility: CLINIC | Age: 51
End: 2021-07-28
Payer: COMMERCIAL

## 2021-07-28 DIAGNOSIS — E87.6 POTASSIUM (K) DEFICIENCY: ICD-10-CM

## 2021-07-28 LAB — POTASSIUM SERPL-SCNC: 3.4 MMOL/L (ref 3.5–5.3)

## 2021-07-28 PROCEDURE — 36415 COLL VENOUS BLD VENIPUNCTURE: CPT

## 2021-07-28 PROCEDURE — 84132 ASSAY OF SERUM POTASSIUM: CPT

## 2021-08-02 ENCOUNTER — OFFICE VISIT (OUTPATIENT)
Dept: FAMILY MEDICINE CLINIC | Facility: CLINIC | Age: 51
End: 2021-08-02
Payer: COMMERCIAL

## 2021-08-02 VITALS
WEIGHT: 153 LBS | BODY MASS INDEX: 27.11 KG/M2 | HEART RATE: 97 BPM | HEIGHT: 63 IN | SYSTOLIC BLOOD PRESSURE: 136 MMHG | TEMPERATURE: 97.8 F | DIASTOLIC BLOOD PRESSURE: 78 MMHG | OXYGEN SATURATION: 99 %

## 2021-08-02 DIAGNOSIS — E03.9 HYPOTHYROIDISM, UNSPECIFIED TYPE: ICD-10-CM

## 2021-08-02 DIAGNOSIS — J45.20 MILD INTERMITTENT ASTHMA WITHOUT COMPLICATION: ICD-10-CM

## 2021-08-02 DIAGNOSIS — Z11.59 NEED FOR HEPATITIS C SCREENING TEST: ICD-10-CM

## 2021-08-02 DIAGNOSIS — Z12.11 SCREEN FOR COLON CANCER: ICD-10-CM

## 2021-08-02 DIAGNOSIS — J30.1 ALLERGIC RHINITIS DUE TO POLLEN, UNSPECIFIED SEASONALITY: ICD-10-CM

## 2021-08-02 DIAGNOSIS — Z00.00 HEALTHCARE MAINTENANCE: ICD-10-CM

## 2021-08-02 DIAGNOSIS — E78.5 HYPERLIPIDEMIA, UNSPECIFIED HYPERLIPIDEMIA TYPE: ICD-10-CM

## 2021-08-02 DIAGNOSIS — I10 ESSENTIAL HYPERTENSION: ICD-10-CM

## 2021-08-02 DIAGNOSIS — E87.6 POTASSIUM (K) DEFICIENCY: Primary | ICD-10-CM

## 2021-08-02 PROCEDURE — 3008F BODY MASS INDEX DOCD: CPT | Performed by: FAMILY MEDICINE

## 2021-08-02 PROCEDURE — 99213 OFFICE O/P EST LOW 20 MIN: CPT | Performed by: FAMILY MEDICINE

## 2021-08-02 RX ORDER — ALBUTEROL SULFATE 90 UG/1
2 AEROSOL, METERED RESPIRATORY (INHALATION) EVERY 6 HOURS PRN
Qty: 6.7 G | Refills: 1 | Status: SHIPPED | OUTPATIENT
Start: 2021-08-02 | End: 2021-08-16 | Stop reason: SDUPTHER

## 2021-08-02 RX ORDER — LISINOPRIL 20 MG/1
20 TABLET ORAL DAILY
Qty: 90 TABLET | Refills: 1 | Status: SHIPPED | OUTPATIENT
Start: 2021-08-02 | End: 2021-08-16 | Stop reason: SDUPTHER

## 2021-08-02 RX ORDER — LEVOTHYROXINE SODIUM 0.03 MG/1
25 TABLET ORAL DAILY
Qty: 90 TABLET | Refills: 1 | Status: SHIPPED | OUTPATIENT
Start: 2021-08-02 | End: 2021-08-16 | Stop reason: SDUPTHER

## 2021-08-02 RX ORDER — AMLODIPINE BESYLATE 10 MG/1
10 TABLET ORAL DAILY
Qty: 90 TABLET | Refills: 2 | Status: SHIPPED | OUTPATIENT
Start: 2021-08-02 | End: 2021-08-16 | Stop reason: SDUPTHER

## 2021-08-02 RX ORDER — ATORVASTATIN CALCIUM 20 MG/1
20 TABLET, FILM COATED ORAL DAILY
Qty: 90 TABLET | Refills: 1 | Status: SHIPPED | OUTPATIENT
Start: 2021-08-02 | End: 2021-08-16 | Stop reason: SDUPTHER

## 2021-08-02 RX ORDER — FLUTICASONE PROPIONATE 50 MCG
1 SPRAY, SUSPENSION (ML) NASAL DAILY
Qty: 9.9 ML | Refills: 1 | Status: SHIPPED | OUTPATIENT
Start: 2021-08-02 | End: 2021-08-16 | Stop reason: SDUPTHER

## 2021-08-02 NOTE — ASSESSMENT & PLAN NOTE
- Order Cologuard as patient prefers non invasive colon cancer screening  - Order Hepatitis C screening

## 2021-08-02 NOTE — ASSESSMENT & PLAN NOTE
Patient uses albuterol inhaler twice a week on average  Only when doing something physically active      - Refill albuterol

## 2021-08-02 NOTE — ASSESSMENT & PLAN NOTE
Improving, currently 3 4   Patient has continued to take HCTZ after discussing discontinuing this medication 6/2021     - Discussed discontinuing HCTZ again today  - Hold potassium supplementation  - Recheck BMP in 2 weeks with Magnesium

## 2021-08-02 NOTE — ASSESSMENT & PLAN NOTE
/78 today  Compliant with medications, however continues to take hctz after discontinuation      - discussed d/c hctz  - increase lisinopril to 20mg  - refill amlodipine  - continue to check BP at home, reports 130s/80s

## 2021-08-02 NOTE — PROGRESS NOTES
Assessment/Plan:    Potassium (K) deficiency  Improving, currently 3 4  Patient has continued to take HCTZ after discussing discontinuing this medication 6/2021     - Discussed discontinuing HCTZ again today  - Hold potassium supplementation  - Recheck BMP in 2 weeks with Magnesium    Healthcare maintenance  - Order Cologuard as patient prefers non invasive colon cancer screening  - Order Hepatitis C screening    Mild intermittent asthma without complication  Patient uses albuterol inhaler twice a week on average  Only when doing something physically active  - Refill albuterol     Essential hypertension  /78 today  Compliant with medications, however continues to take hctz after discontinuation  - discussed d/c hctz  - increase lisinopril to 20mg  - refill amlodipine  - continue to check BP at home, reports 130s/80s       Diagnoses and all orders for this visit:    Potassium (K) deficiency  -     Magnesium; Future  -     Basic metabolic panel; Future    Healthcare maintenance    Mild intermittent asthma without complication  -     albuterol (PROVENTIL HFA,VENTOLIN HFA) 90 mcg/act inhaler; Inhale 2 puffs every 6 (six) hours as needed for wheezing or shortness of breath    Essential hypertension  -     amLODIPine (NORVASC) 10 mg tablet; Take 1 tablet (10 mg total) by mouth daily  -     lisinopril (ZESTRIL) 20 mg tablet; Take 1 tablet (20 mg total) by mouth daily    Hyperlipidemia, unspecified hyperlipidemia type  -     atorvastatin (LIPITOR) 20 mg tablet; Take 1 tablet (20 mg total) by mouth daily    Allergic rhinitis due to pollen, unspecified seasonality  -     fluticasone (FLONASE) 50 mcg/act nasal spray; 1 spray into each nostril daily    Hypothyroidism, unspecified type  -     levothyroxine 25 mcg tablet; Take 1 tablet (25 mcg total) by mouth daily    Need for hepatitis C screening test  -     Hepatitis C antibody; Future    Screen for colon cancer  -     Cologuard;  Future          Subjective: Patient ID: Maria G العراقي is a 44 y o  male  He presents today for a follow up on low potassium levels  He admits to continued HCTZ use even after discontinuation of this medication  He reports compliance with all of his medications  Uses albuterol inhaler about twice a week, only when he is active  The following portions of the patient's history were reviewed and updated as appropriate: allergies, past family history, past social history and past surgical history  Review of Systems   Constitutional: Negative for fatigue and fever  HENT: Negative for congestion, rhinorrhea, sneezing and sore throat  Respiratory: Negative for cough, chest tightness, shortness of breath and wheezing  Cardiovascular: Negative for chest pain and palpitations  Gastrointestinal: Negative for abdominal pain, constipation, diarrhea, nausea and vomiting  Musculoskeletal: Negative for arthralgias, gait problem, myalgias and neck pain  Skin: Negative for rash  Neurological: Negative for dizziness, weakness, numbness and headaches  Objective:      /78   Pulse 97   Temp 97 8 °F (36 6 °C)   Ht 5' 3" (1 6 m)   Wt 69 4 kg (153 lb)   SpO2 99%   BMI 27 10 kg/m²          Physical Exam  Vitals reviewed  Constitutional:       General: He is not in acute distress  Appearance: He is well-developed  He is not toxic-appearing or diaphoretic  HENT:      Head: Normocephalic and atraumatic  Eyes:      General: No scleral icterus  Right eye: No discharge  Left eye: No discharge  Conjunctiva/sclera: Conjunctivae normal    Cardiovascular:      Rate and Rhythm: Normal rate and regular rhythm  Heart sounds: Normal heart sounds  No murmur heard  Pulmonary:      Effort: Pulmonary effort is normal  No respiratory distress  Breath sounds: Normal breath sounds  No wheezing  Abdominal:      General: There is no distension  Palpations: Abdomen is soft  Tenderness:  There is no abdominal tenderness  Musculoskeletal:         General: No tenderness  Normal range of motion  Skin:     General: Skin is warm  Findings: No erythema or rash  Neurological:      Mental Status: He is alert     Psychiatric:         Behavior: Behavior normal

## 2021-08-10 ENCOUNTER — APPOINTMENT (OUTPATIENT)
Dept: LAB | Facility: CLINIC | Age: 51
End: 2021-08-10
Payer: COMMERCIAL

## 2021-08-10 DIAGNOSIS — Z11.59 NEED FOR HEPATITIS C SCREENING TEST: ICD-10-CM

## 2021-08-10 DIAGNOSIS — E87.6 POTASSIUM (K) DEFICIENCY: ICD-10-CM

## 2021-08-10 LAB
ANION GAP SERPL CALCULATED.3IONS-SCNC: 6 MMOL/L (ref 4–13)
BUN SERPL-MCNC: 23 MG/DL (ref 5–25)
CALCIUM SERPL-MCNC: 9.3 MG/DL (ref 8.3–10.1)
CHLORIDE SERPL-SCNC: 107 MMOL/L (ref 100–108)
CO2 SERPL-SCNC: 28 MMOL/L (ref 21–32)
CREAT SERPL-MCNC: 1.21 MG/DL (ref 0.6–1.3)
GFR SERPL CREATININE-BSD FRML MDRD: 79 ML/MIN/1.73SQ M
GLUCOSE P FAST SERPL-MCNC: 80 MG/DL (ref 65–99)
HCV AB SER QL: NORMAL
MAGNESIUM SERPL-MCNC: 2.9 MG/DL (ref 1.6–2.6)
POTASSIUM SERPL-SCNC: 4 MMOL/L (ref 3.5–5.3)
SODIUM SERPL-SCNC: 141 MMOL/L (ref 136–145)

## 2021-08-10 PROCEDURE — 83735 ASSAY OF MAGNESIUM: CPT

## 2021-08-10 PROCEDURE — 36415 COLL VENOUS BLD VENIPUNCTURE: CPT

## 2021-08-10 PROCEDURE — 86803 HEPATITIS C AB TEST: CPT

## 2021-08-10 PROCEDURE — 80048 BASIC METABOLIC PNL TOTAL CA: CPT

## 2021-08-16 ENCOUNTER — OFFICE VISIT (OUTPATIENT)
Dept: FAMILY MEDICINE CLINIC | Facility: CLINIC | Age: 51
End: 2021-08-16
Payer: COMMERCIAL

## 2021-08-16 VITALS
RESPIRATION RATE: 20 BRPM | SYSTOLIC BLOOD PRESSURE: 110 MMHG | HEART RATE: 72 BPM | BODY MASS INDEX: 26.68 KG/M2 | DIASTOLIC BLOOD PRESSURE: 70 MMHG | WEIGHT: 150.6 LBS | TEMPERATURE: 97.1 F | OXYGEN SATURATION: 97 %

## 2021-08-16 DIAGNOSIS — I10 ESSENTIAL HYPERTENSION: ICD-10-CM

## 2021-08-16 DIAGNOSIS — J30.1 ALLERGIC RHINITIS DUE TO POLLEN, UNSPECIFIED SEASONALITY: ICD-10-CM

## 2021-08-16 DIAGNOSIS — E78.5 HYPERLIPIDEMIA, UNSPECIFIED HYPERLIPIDEMIA TYPE: ICD-10-CM

## 2021-08-16 DIAGNOSIS — J45.20 MILD INTERMITTENT ASTHMA WITHOUT COMPLICATION: ICD-10-CM

## 2021-08-16 DIAGNOSIS — E03.9 HYPOTHYROIDISM, UNSPECIFIED TYPE: ICD-10-CM

## 2021-08-16 DIAGNOSIS — E87.6 POTASSIUM (K) DEFICIENCY: Primary | ICD-10-CM

## 2021-08-16 PROCEDURE — 99213 OFFICE O/P EST LOW 20 MIN: CPT | Performed by: FAMILY MEDICINE

## 2021-08-16 RX ORDER — AMLODIPINE BESYLATE 10 MG/1
10 TABLET ORAL DAILY
Qty: 90 TABLET | Refills: 2 | Status: SHIPPED | OUTPATIENT
Start: 2021-08-16 | End: 2022-03-14 | Stop reason: SDUPTHER

## 2021-08-16 RX ORDER — LISINOPRIL 20 MG/1
20 TABLET ORAL DAILY
Qty: 90 TABLET | Refills: 1 | Status: SHIPPED | OUTPATIENT
Start: 2021-08-16 | End: 2022-03-14 | Stop reason: SDUPTHER

## 2021-08-16 RX ORDER — LEVOTHYROXINE SODIUM 0.03 MG/1
25 TABLET ORAL DAILY
Qty: 90 TABLET | Refills: 1 | Status: SHIPPED | OUTPATIENT
Start: 2021-08-16 | End: 2022-03-14 | Stop reason: SDUPTHER

## 2021-08-16 RX ORDER — HYDROCHLOROTHIAZIDE 25 MG/1
TABLET ORAL
COMMUNITY
Start: 2021-08-09 | End: 2021-08-16 | Stop reason: ALTCHOICE

## 2021-08-16 RX ORDER — ALBUTEROL SULFATE 90 UG/1
2 AEROSOL, METERED RESPIRATORY (INHALATION) EVERY 6 HOURS PRN
Qty: 6.7 G | Refills: 1 | Status: SHIPPED | OUTPATIENT
Start: 2021-08-16 | End: 2021-11-18 | Stop reason: SDUPTHER

## 2021-08-16 RX ORDER — ATORVASTATIN CALCIUM 20 MG/1
20 TABLET, FILM COATED ORAL DAILY
Qty: 90 TABLET | Refills: 1 | Status: SHIPPED | OUTPATIENT
Start: 2021-08-16 | End: 2022-03-14 | Stop reason: SDUPTHER

## 2021-08-16 RX ORDER — FLUTICASONE PROPIONATE 50 MCG
1 SPRAY, SUSPENSION (ML) NASAL DAILY
Qty: 9.9 ML | Refills: 1 | Status: SHIPPED | OUTPATIENT
Start: 2021-08-16 | End: 2022-03-14 | Stop reason: SDUPTHER

## 2021-08-16 NOTE — PROGRESS NOTES
Family Medicine Follow-Up Office Visit  Sadie Bear 44 y o  male   MRN: [id number] : 1970  ENCOUNTER: 2021 2:50 PM    Assessment and Plan   Potassium (K) deficiency  Recheck 4 0  Magnesium slightly elevated at 2 9     - Recommend no potassium supplementation at this time and avoiding HCTZ    Hyperlipidemia  Refill Lipitor    Essential hypertension  Previous visit we increased lisinopril to 20mg with a discontinuation of hctz  /70 today  - Continue current regimen lisinopril 20mg with amlodipine 10mg  - Continue checking BP at home    Mild intermittent asthma without complication  Continues to suffer from shortness of breath when exposed to cigarette smoke  States he cant avoid it since he is always around people who smoke  - Discussed daily controller, patient states he will discuss this thoroughly at next visit  - Refill albuterol    Allergic rhinitis due to pollen  Refill flonase    Hypothyroidism  Refill levothyroxine      Chief Complaint   No chief complaint on file  History of Present Illness   Sadie Bear is a 44y o -year-old male who presents today for a follow up on hypokalemia  He recently stopped his HCTZ and rechecked his potassium which is now in the normal range  His BP has been under control since increasing lisinopril to 20mg  He denies any new complaints today    Review of Systems   Review of Systems   Constitutional: Negative for fatigue and fever  HENT: Negative for congestion, rhinorrhea, sneezing and sore throat  Respiratory: Negative for cough, chest tightness, shortness of breath and wheezing  Cardiovascular: Negative for chest pain and palpitations  Gastrointestinal: Negative for abdominal pain, constipation, diarrhea, nausea and vomiting  Musculoskeletal: Negative for arthralgias, gait problem, myalgias and neck pain  Skin: Negative for rash  Neurological: Negative for dizziness, weakness, numbness and headaches         Active Problem List Patient Active Problem List   Diagnosis    Essential hypertension    Hyperlipidemia    Hypothyroidism    Potassium (K) deficiency    Allergic rhinitis due to pollen    Mild intermittent asthma without complication    Muscle ache    Healthcare maintenance       Past Medical History, Past Surgical History, Family History, and Social History were reviewed and updated today as appropriate  Objective   /70 (BP Location: Right arm, Patient Position: Sitting, Cuff Size: Standard)   Pulse 72   Temp (!) 97 1 °F (36 2 °C)   Resp 20   Wt 68 3 kg (150 lb 9 6 oz)   SpO2 97%   BMI 26 68 kg/m²     Physical Exam  Vitals reviewed  Constitutional:       General: He is not in acute distress  Appearance: He is well-developed  He is not toxic-appearing or diaphoretic  HENT:      Head: Normocephalic and atraumatic  Eyes:      General: No scleral icterus  Right eye: No discharge  Left eye: No discharge  Conjunctiva/sclera: Conjunctivae normal    Cardiovascular:      Rate and Rhythm: Normal rate and regular rhythm  Heart sounds: Normal heart sounds  No murmur heard  Pulmonary:      Effort: Pulmonary effort is normal  No respiratory distress  Breath sounds: Normal breath sounds  No wheezing  Abdominal:      General: There is no distension  Palpations: Abdomen is soft  Tenderness: There is no abdominal tenderness  Musculoskeletal:         General: No tenderness  Normal range of motion  Skin:     General: Skin is warm  Findings: No erythema or rash  Neurological:      Mental Status: He is alert     Psychiatric:         Behavior: Behavior normal          Pertinent Laboratory/Diagnostic Studies:  Lab Results   Component Value Date    BUN 23 08/10/2021    CREATININE 1 21 08/10/2021    CALCIUM 9 3 08/10/2021    K 4 0 08/10/2021    CO2 28 08/10/2021     08/10/2021     No results found for: ALT, AST, GGT, ALKPHOS, BILITOT    No results found for: WBC, HGB, HCT, MCV, PLT    No results found for: TSH    No results found for: CHOL  Lab Results   Component Value Date    TRIG 63 06/04/2021     Lab Results   Component Value Date    HDL 42 06/04/2021     Lab Results   Component Value Date    LDLCALC 62 06/04/2021     No results found for: HGBA1C    Results for orders placed or performed in visit on 08/10/21   Magnesium   Result Value Ref Range    Magnesium 2 9 (H) 1 6 - 2 6 mg/dL   Basic metabolic panel   Result Value Ref Range    Sodium 141 136 - 145 mmol/L    Potassium 4 0 3 5 - 5 3 mmol/L    Chloride 107 100 - 108 mmol/L    CO2 28 21 - 32 mmol/L    ANION GAP 6 4 - 13 mmol/L    BUN 23 5 - 25 mg/dL    Creatinine 1 21 0 60 - 1 30 mg/dL    Glucose, Fasting 80 65 - 99 mg/dL    Calcium 9 3 8 3 - 10 1 mg/dL    eGFR 79 ml/min/1 73sq m   Hepatitis C antibody   Result Value Ref Range    Hepatitis C Ab Non-reactive Non-reactive       No orders of the defined types were placed in this encounter  Current Medications     Current Outpatient Medications   Medication Sig Dispense Refill    albuterol (PROVENTIL HFA,VENTOLIN HFA) 90 mcg/act inhaler Inhale 2 puffs every 6 (six) hours as needed for wheezing or shortness of breath 6 7 g 1    atorvastatin (LIPITOR) 20 mg tablet Take 1 tablet (20 mg total) by mouth daily 90 tablet 1    fluticasone (FLONASE) 50 mcg/act nasal spray 1 spray into each nostril daily 9 9 mL 1    levothyroxine 25 mcg tablet Take 1 tablet (25 mcg total) by mouth daily 90 tablet 1    lisinopril (ZESTRIL) 20 mg tablet Take 1 tablet (20 mg total) by mouth daily 90 tablet 1    Menthol-Methyl Salicylate (ZACH NESS GREASELESS) 10-15 % greaseless cream Apply topically daily at bedtime 30 g 0    amLODIPine (NORVASC) 10 mg tablet Take 1 tablet (10 mg total) by mouth daily 90 tablet 2     No current facility-administered medications for this visit         ALLERGIES:  No Known Allergies    Health Maintenance     Health Maintenance   Topic Date Due    Pneumococcal Vaccine: Pediatrics (0 to 5 Years) and At-Risk Patients (6 to 59 Years) (1 of 2 - PPSV23) Never done    COVID-19 Vaccine (1) Never done    HIV Screening  Never done    BMI: Followup Plan  Never done    Annual Physical  Never done    DTaP,Tdap,and Td Vaccines (1 - Tdap) Never done    Colorectal Cancer Screening  Never done    Influenza Vaccine (1) 09/01/2021    Depression Screening PHQ  11/24/2021    BMI: Adult  08/02/2022    Hepatitis C Screening  Completed    HIB Vaccine  Aged Out    Hepatitis B Vaccine  Aged Out    IPV Vaccine  Aged Out    Hepatitis A Vaccine  Aged Out    Meningococcal ACWY Vaccine  Aged Out    HPV Vaccine  Aged [employer] History   Administered Date(s) Administered    INFLUENZA 01/22/2018, 01/29/2019         Yonatan Schaeffer MD   [address]  8/16/2021  2:50 PM    Parts of this note were dictated using [employer] dictation software and may have sounds-like errors due to variation in pronunciation

## 2021-08-16 NOTE — ASSESSMENT & PLAN NOTE
Recheck 4 0   Magnesium slightly elevated at 2 9     - Recommend no potassium supplementation at this time and avoiding HCTZ

## 2021-08-16 NOTE — ASSESSMENT & PLAN NOTE
Continues to suffer from shortness of breath when exposed to cigarette smoke  States he cant avoid it since he is always around people who smoke      - Discussed daily controller, patient states he will discuss this thoroughly at next visit  - Refill albuterol

## 2021-08-16 NOTE — ASSESSMENT & PLAN NOTE
Previous visit we increased lisinopril to 20mg with a discontinuation of hctz  /70 today      - Continue current regimen lisinopril 20mg with amlodipine 10mg  - Continue checking BP at home

## 2021-11-18 ENCOUNTER — OFFICE VISIT (OUTPATIENT)
Dept: FAMILY MEDICINE CLINIC | Facility: CLINIC | Age: 51
End: 2021-11-18
Payer: COMMERCIAL

## 2021-11-18 VITALS
HEART RATE: 90 BPM | BODY MASS INDEX: 27.52 KG/M2 | SYSTOLIC BLOOD PRESSURE: 110 MMHG | OXYGEN SATURATION: 97 % | DIASTOLIC BLOOD PRESSURE: 80 MMHG | TEMPERATURE: 97.4 F | WEIGHT: 155.38 LBS

## 2021-11-18 DIAGNOSIS — J45.20 MILD INTERMITTENT ASTHMA WITHOUT COMPLICATION: ICD-10-CM

## 2021-11-18 PROCEDURE — 99213 OFFICE O/P EST LOW 20 MIN: CPT | Performed by: FAMILY MEDICINE

## 2021-11-18 RX ORDER — ALBUTEROL SULFATE 90 UG/1
2 AEROSOL, METERED RESPIRATORY (INHALATION) EVERY 6 HOURS PRN
Qty: 6.7 G | Refills: 3 | Status: SHIPPED | OUTPATIENT
Start: 2021-11-18 | End: 2022-03-14 | Stop reason: SDUPTHER

## 2021-11-18 RX ORDER — DEXAMETHASONE 4 MG/1
2 TABLET ORAL 2 TIMES DAILY
Qty: 12 G | Refills: 1 | Status: SHIPPED | OUTPATIENT
Start: 2021-11-18 | End: 2022-03-14 | Stop reason: SDUPTHER

## 2021-12-02 ENCOUNTER — OFFICE VISIT (OUTPATIENT)
Dept: FAMILY MEDICINE CLINIC | Facility: CLINIC | Age: 51
End: 2021-12-02
Payer: COMMERCIAL

## 2021-12-02 VITALS
HEART RATE: 90 BPM | OXYGEN SATURATION: 97 % | HEIGHT: 63 IN | TEMPERATURE: 97.6 F | BODY MASS INDEX: 27.11 KG/M2 | DIASTOLIC BLOOD PRESSURE: 78 MMHG | SYSTOLIC BLOOD PRESSURE: 120 MMHG | WEIGHT: 153 LBS

## 2021-12-02 DIAGNOSIS — J45.20 MILD INTERMITTENT ASTHMA WITHOUT COMPLICATION: Primary | ICD-10-CM

## 2021-12-02 PROCEDURE — 99213 OFFICE O/P EST LOW 20 MIN: CPT | Performed by: FAMILY MEDICINE

## 2022-03-14 ENCOUNTER — OFFICE VISIT (OUTPATIENT)
Dept: FAMILY MEDICINE CLINIC | Facility: CLINIC | Age: 52
End: 2022-03-14
Payer: COMMERCIAL

## 2022-03-14 VITALS
WEIGHT: 158.5 LBS | DIASTOLIC BLOOD PRESSURE: 70 MMHG | SYSTOLIC BLOOD PRESSURE: 132 MMHG | OXYGEN SATURATION: 97 % | HEART RATE: 101 BPM | TEMPERATURE: 98.3 F | BODY MASS INDEX: 28.08 KG/M2

## 2022-03-14 DIAGNOSIS — I10 ESSENTIAL HYPERTENSION: ICD-10-CM

## 2022-03-14 DIAGNOSIS — J45.20 MILD INTERMITTENT ASTHMA WITHOUT COMPLICATION: Primary | ICD-10-CM

## 2022-03-14 DIAGNOSIS — J30.1 ALLERGIC RHINITIS DUE TO POLLEN, UNSPECIFIED SEASONALITY: ICD-10-CM

## 2022-03-14 DIAGNOSIS — E03.9 HYPOTHYROIDISM, UNSPECIFIED TYPE: ICD-10-CM

## 2022-03-14 DIAGNOSIS — E78.5 HYPERLIPIDEMIA, UNSPECIFIED HYPERLIPIDEMIA TYPE: ICD-10-CM

## 2022-03-14 PROCEDURE — 99213 OFFICE O/P EST LOW 20 MIN: CPT | Performed by: FAMILY MEDICINE

## 2022-03-14 RX ORDER — DEXAMETHASONE 4 MG/1
2 TABLET ORAL 2 TIMES DAILY
Qty: 12 G | Refills: 1 | Status: SHIPPED | OUTPATIENT
Start: 2022-03-14 | End: 2022-07-06 | Stop reason: SDUPTHER

## 2022-03-14 RX ORDER — ATORVASTATIN CALCIUM 20 MG/1
20 TABLET, FILM COATED ORAL DAILY
Qty: 90 TABLET | Refills: 1 | Status: SHIPPED | OUTPATIENT
Start: 2022-03-14 | End: 2022-07-06 | Stop reason: SDUPTHER

## 2022-03-14 RX ORDER — LEVOTHYROXINE SODIUM 0.03 MG/1
25 TABLET ORAL DAILY
Qty: 90 TABLET | Refills: 1 | Status: SHIPPED | OUTPATIENT
Start: 2022-03-14 | End: 2022-07-06 | Stop reason: SDUPTHER

## 2022-03-14 RX ORDER — AMLODIPINE BESYLATE 10 MG/1
10 TABLET ORAL DAILY
Qty: 90 TABLET | Refills: 2 | Status: SHIPPED | OUTPATIENT
Start: 2022-03-14 | End: 2022-07-06 | Stop reason: SDUPTHER

## 2022-03-14 RX ORDER — LISINOPRIL 20 MG/1
20 TABLET ORAL DAILY
Qty: 90 TABLET | Refills: 1 | Status: SHIPPED | OUTPATIENT
Start: 2022-03-14 | End: 2022-07-06 | Stop reason: SDUPTHER

## 2022-03-14 RX ORDER — FLUTICASONE PROPIONATE 50 MCG
1 SPRAY, SUSPENSION (ML) NASAL DAILY
Qty: 9.9 ML | Refills: 1 | Status: SHIPPED | OUTPATIENT
Start: 2022-03-14 | End: 2022-07-06 | Stop reason: SDUPTHER

## 2022-03-14 RX ORDER — ALBUTEROL SULFATE 90 UG/1
2 AEROSOL, METERED RESPIRATORY (INHALATION) EVERY 6 HOURS PRN
Qty: 6.7 G | Refills: 3 | Status: SHIPPED | OUTPATIENT
Start: 2022-03-14 | End: 2022-07-06 | Stop reason: SDUPTHER

## 2022-03-15 NOTE — PROGRESS NOTES
Assessment/Plan:    Mild intermittent asthma without complication  Tolerating flovent well  Has used albuterol only once or twice daily since starting daily controller  Denies any new symptoms    - Refill albuterol  - Refill flovent    Essential hypertension  Stable 132/70  Compliant with norvasc 10 and lisinopril 20  - Refill norvasc and lisinopril  - Continue to check BP at home    Hyperlipidemia  Most recent lipid panel WNL 6/2021  Requires refill today on lipitor    - Refill lipitor 20  - Recheck lipid panel 6/2022    Hypothyroidism  Most recent TSH 1 68 6/2021  Stable on levothyroxine 25    - Refill levothyroxine    RTC in 3 months for annual physical       Diagnoses and all orders for this visit:    Mild intermittent asthma without complication  -     albuterol (PROVENTIL HFA,VENTOLIN HFA) 90 mcg/act inhaler; Inhale 2 puffs every 6 (six) hours as needed for wheezing or shortness of breath  -     fluticasone (Flovent HFA) 110 MCG/ACT inhaler; Inhale 2 puffs 2 (two) times a day Rinse mouth after use  Essential hypertension  -     amLODIPine (NORVASC) 10 mg tablet; Take 1 tablet (10 mg total) by mouth daily  -     lisinopril (ZESTRIL) 20 mg tablet; Take 1 tablet (20 mg total) by mouth daily    Hyperlipidemia, unspecified hyperlipidemia type  -     atorvastatin (LIPITOR) 20 mg tablet; Take 1 tablet (20 mg total) by mouth daily    Allergic rhinitis due to pollen, unspecified seasonality  -     fluticasone (FLONASE) 50 mcg/act nasal spray; 1 spray into each nostril daily    Hypothyroidism, unspecified type  -     levothyroxine 25 mcg tablet; Take 1 tablet (25 mcg total) by mouth daily          Subjective:      Patient ID: En Acharya is a 46 y o  male  He presents today for a follow up after starting flovent  States that his SOB symptoms are improving and that he has not used his albuterol as much as he use to prior to starting flovent  Continues to have same triggers of exposure to cigarette smoke   He has been compliant with his medications and requires a refill on various meds  No new complaints today  The following portions of the patient's history were reviewed and updated as appropriate: allergies, current medications, past medical history and problem list     Review of Systems   Constitutional: Negative for fatigue and fever  HENT: Negative for congestion, rhinorrhea, sneezing and sore throat  Respiratory: Negative for cough, chest tightness, shortness of breath and wheezing  Cardiovascular: Negative for chest pain and palpitations  Gastrointestinal: Negative for abdominal pain, constipation, diarrhea, nausea and vomiting  Musculoskeletal: Negative for arthralgias, gait problem, myalgias and neck pain  Skin: Negative for rash  Neurological: Negative for dizziness, weakness, numbness and headaches  Objective:      /70 (BP Location: Right arm, Patient Position: Sitting, Cuff Size: Large)   Pulse 101   Temp 98 3 °F (36 8 °C) (Temporal)   Wt 71 9 kg (158 lb 8 oz)   SpO2 97%   BMI 28 08 kg/m²          Physical Exam  Vitals reviewed  Constitutional:       General: He is not in acute distress  Appearance: He is well-developed  He is not toxic-appearing or diaphoretic  HENT:      Head: Normocephalic and atraumatic  Eyes:      General: No scleral icterus  Right eye: No discharge  Left eye: No discharge  Conjunctiva/sclera: Conjunctivae normal    Cardiovascular:      Rate and Rhythm: Normal rate and regular rhythm  Heart sounds: Normal heart sounds  No murmur heard  Pulmonary:      Effort: Pulmonary effort is normal  No respiratory distress  Breath sounds: Normal breath sounds  No wheezing  Abdominal:      General: There is no distension  Palpations: Abdomen is soft  Tenderness: There is no abdominal tenderness  Skin:     General: Skin is warm  Findings: No erythema or rash     Neurological:      Mental Status: He is alert    Psychiatric:         Behavior: Behavior normal

## 2022-03-15 NOTE — ASSESSMENT & PLAN NOTE
Tolerating flovent well  Has used albuterol only once or twice daily since starting daily controller   Denies any new symptoms    - Refill albuterol  - Refill flovent

## 2022-03-15 NOTE — ASSESSMENT & PLAN NOTE
Stable 132/70  Compliant with norvasc 10 and lisinopril 20      - Refill norvasc and lisinopril  - Continue to check BP at home

## 2022-03-15 NOTE — ASSESSMENT & PLAN NOTE
Most recent lipid panel WNL 6/2021   Requires refill today on lipitor    - Refill lipitor 20  - Recheck lipid panel 6/2022

## 2022-03-15 NOTE — ASSESSMENT & PLAN NOTE
Most recent TSH 1 68 6/2021   Stable on levothyroxine 25    - Refill levothyroxine    RTC in 3 months for annual physical

## 2022-07-06 ENCOUNTER — OFFICE VISIT (OUTPATIENT)
Dept: FAMILY MEDICINE CLINIC | Facility: CLINIC | Age: 52
End: 2022-07-06
Payer: COMMERCIAL

## 2022-07-06 VITALS
OXYGEN SATURATION: 97 % | DIASTOLIC BLOOD PRESSURE: 78 MMHG | HEART RATE: 90 BPM | TEMPERATURE: 98.7 F | SYSTOLIC BLOOD PRESSURE: 134 MMHG | HEIGHT: 63 IN | BODY MASS INDEX: 26.94 KG/M2 | WEIGHT: 152.06 LBS | RESPIRATION RATE: 22 BRPM

## 2022-07-06 DIAGNOSIS — Z00.00 ANNUAL PHYSICAL EXAM: Primary | ICD-10-CM

## 2022-07-06 DIAGNOSIS — J45.20 MILD INTERMITTENT ASTHMA WITHOUT COMPLICATION: ICD-10-CM

## 2022-07-06 DIAGNOSIS — N18.2 CHRONIC KIDNEY DISEASE (CKD) STAGE G2/A2, MILDLY DECREASED GLOMERULAR FILTRATION RATE (GFR) BETWEEN 60-89 ML/MIN/1.73 SQUARE METER AND ALBUMINURIA CREATININE RATIO BETWEEN 30-299 MG/G: ICD-10-CM

## 2022-07-06 DIAGNOSIS — Z12.11 ENCOUNTER FOR SCREENING COLONOSCOPY: ICD-10-CM

## 2022-07-06 DIAGNOSIS — E78.5 HYPERLIPIDEMIA, UNSPECIFIED HYPERLIPIDEMIA TYPE: ICD-10-CM

## 2022-07-06 DIAGNOSIS — E03.9 HYPOTHYROIDISM, UNSPECIFIED TYPE: ICD-10-CM

## 2022-07-06 DIAGNOSIS — E87.6 POTASSIUM (K) DEFICIENCY: ICD-10-CM

## 2022-07-06 DIAGNOSIS — J30.1 ALLERGIC RHINITIS DUE TO POLLEN, UNSPECIFIED SEASONALITY: ICD-10-CM

## 2022-07-06 DIAGNOSIS — I10 ESSENTIAL HYPERTENSION: ICD-10-CM

## 2022-07-06 PROCEDURE — 99203 OFFICE O/P NEW LOW 30 MIN: CPT

## 2022-07-06 PROCEDURE — 99213 OFFICE O/P EST LOW 20 MIN: CPT

## 2022-07-06 RX ORDER — ATORVASTATIN CALCIUM 20 MG/1
20 TABLET, FILM COATED ORAL DAILY
Qty: 90 TABLET | Refills: 1 | Status: SHIPPED | OUTPATIENT
Start: 2022-07-06 | End: 2022-09-21 | Stop reason: SDUPTHER

## 2022-07-06 RX ORDER — FLUTICASONE PROPIONATE 50 MCG
1 SPRAY, SUSPENSION (ML) NASAL DAILY
Qty: 9.9 ML | Refills: 1 | Status: SHIPPED | OUTPATIENT
Start: 2022-07-06 | End: 2022-09-21 | Stop reason: SDUPTHER

## 2022-07-06 RX ORDER — AMLODIPINE BESYLATE 10 MG/1
10 TABLET ORAL DAILY
Qty: 90 TABLET | Refills: 2 | Status: SHIPPED | OUTPATIENT
Start: 2022-07-06 | End: 2022-09-21 | Stop reason: SDUPTHER

## 2022-07-06 RX ORDER — LEVOTHYROXINE SODIUM 0.03 MG/1
25 TABLET ORAL DAILY
Qty: 90 TABLET | Refills: 1 | Status: SHIPPED | OUTPATIENT
Start: 2022-07-06 | End: 2022-09-21 | Stop reason: SDUPTHER

## 2022-07-06 RX ORDER — LISINOPRIL 20 MG/1
20 TABLET ORAL DAILY
Qty: 90 TABLET | Refills: 1 | Status: SHIPPED | OUTPATIENT
Start: 2022-07-06 | End: 2022-09-21 | Stop reason: SDUPTHER

## 2022-07-06 RX ORDER — FLUTICASONE PROPIONATE 110 UG/1
2 AEROSOL, METERED RESPIRATORY (INHALATION) 2 TIMES DAILY
Qty: 12 G | Refills: 1 | Status: SHIPPED | OUTPATIENT
Start: 2022-07-06 | End: 2022-09-21 | Stop reason: SDUPTHER

## 2022-07-06 RX ORDER — ALBUTEROL SULFATE 90 UG/1
2 AEROSOL, METERED RESPIRATORY (INHALATION) EVERY 6 HOURS PRN
Qty: 6.7 G | Refills: 3 | Status: SHIPPED | OUTPATIENT
Start: 2022-07-06 | End: 2022-09-21 | Stop reason: SDUPTHER

## 2022-07-06 NOTE — ASSESSMENT & PLAN NOTE
Last TSH 1 68 on 06/21  Will recheck TSH  Continue with Levothyroxine 25mcg on an empty stomach in the morning   Refilled Levothryoxine

## 2022-07-06 NOTE — PROGRESS NOTES
[facility]    NAME: Leyda Knowles  AGE: 46 y o  SEX: male  : 1970     DATE: 2022     Assessment and Plan:     Problem List Items Addressed This Visit        Endocrine    Hypothyroidism     Last TSH 1 68 on   Will recheck TSH  Continue with Levothyroxine 25mcg on an empty stomach in the morning  Refilled Levothryoxine            Relevant Medications    levothyroxine 25 mcg tablet    Other Relevant Orders    TSH, 3rd generation       Respiratory    Allergic rhinitis due to pollen    Relevant Medications    fluticasone (FLONASE) 50 mcg/act nasal spray    Mild intermittent asthma without complication     Asthma well controlled  Only uses albuterol when he is around triggers, not using albuterol daily  Continue current regimen  Refilled albuterol and Flovent  Relevant Medications    albuterol (PROVENTIL HFA,VENTOLIN HFA) 90 mcg/act inhaler    fluticasone (Flovent HFA) 110 MCG/ACT inhaler       Cardiovascular and Mediastinum    Essential hypertension     Blood pressure stable in office: 134/78  Continue Lisinopril 20mg daily and Amlodipine 10mg daily  Refilled Lisinopril and Amlodipine             Relevant Medications    amLODIPine (NORVASC) 10 mg tablet    lisinopril (ZESTRIL) 20 mg tablet    Other Relevant Orders    Microalbumin / creatinine urine ratio    Protein / creatinine ratio, urine    Comprehensive metabolic panel    Magnesium       Genitourinary    Chronic kidney disease (CKD) stage G2/A2, mildly decreased glomerular filtration rate (GFR) between 60-89 mL/min/1 73 square meter and albuminuria creatinine ratio between  mg/g     Lab Results   Component Value Date    EGFR 79 08/10/2021    EGFR 91 2021    CREATININE 1 21 08/10/2021    CREATININE 1 08 2021     Will repeat CMP and do microalbumin/creatinine urine ration and Protein/Cr ratio to assess kidney function               Other Hyperlipidemia    Relevant Medications    atorvastatin (LIPITOR) 20 mg tablet    Other Relevant Orders    Lipid panel    Potassium (K) deficiency     Patient has had a trend of hypokalemia 3 4>3 0>2 9 ()  Magnesium slightly elevated at 2 9 on   Will repeat labs  Other Visit Diagnoses     Annual physical exam    -  Primary    Encounter for screening colonoscopy        Relevant Orders    Cologuard            Immunizations and preventive care screenings were discussed with patient today  Appropriate education was printed on patient's after visit summary  Counseling:  · Dental Health: discussed importance of regular tooth brushing, flossing, and dental visits  Return in about 3 months (around 10/6/2022)  Chief Complaint:     Chief Complaint   Patient presents with    Physical Exam      History of Present Illness:     Adult Annual Physical   45 yo -american male with pmhx of asthma, hypertension, hyperlipidemia, and hypothyroidism here for a comprehensive physical exam  The patient reports no problems or concerns today  Asthma:  Asthma is under control  He uses Flovent HFA 2 puffs daily and albuterol as needed  Only uses inhaler occasionally when needed around smoking  Hypertension:  Currently Lisinopril 20 mg and Amlodipine 10mg  Reports blood pressures at home are in the 130s  Hyperlipidemia:  On atorvastatin 20mg  Last Lipid  and well controlled  Cholesterol 117, T, LDL:62, HDL: 42    Hypothyroidism:  On Levothyroxine 25mcg  Denies any hypothyroid symptoms or side effects wit medication  Last TSH 1 68     Patient is interested in cologuard screening  Denies any family history of colon cancer  Diet and Physical Activity  · Diet/Nutrition: well balanced diet  · Exercise: rides bike every other day        Depression Screening  PHQ-2/9 Depression Screening    Little interest or pleasure in doing things: 0 - not at all  Feeling down, depressed, or hopeless: 0 - not at all  PHQ-2 Score: 0  PHQ-2 Interpretation: Negative depression screen          General Health  · Sleep: sleeps well  Denies snoring or waking up in the middle of the night   · Hearing: normal - bilateral   · Vision: no vision problems  · Dental: no dental visits for >1 year and brushes teeth twice daily  Not been to a dentist in 2 years   Health  · Symptoms include: none     Review of Systems:     Review of Systems   Constitutional: Negative for fever  HENT: Negative for congestion and sore throat  Respiratory: Negative for cough, chest tightness and shortness of breath  Cardiovascular: Negative for chest pain  Gastrointestinal: Negative for abdominal pain, constipation and diarrhea  Musculoskeletal: Negative for arthralgias  Past Medical History:     Past Medical History:   Diagnosis Date    Disease of thyroid gland     HTN (hypertension)     Hypertension       Past Surgical History:     No past surgical history on file     Family History:     Family History   Problem Relation Age of Onset    No Known Problems Mother     No Known Problems Father     Diabetes Maternal Grandmother       Social History:     Social History     Socioeconomic History    Marital status: Single     Spouse name: Not on file    Number of children: Not on file    Years of education: 15    Highest education level: 12th grade   Occupational History    Occupation: none    Tobacco Use    Smoking status: Never Smoker    Smokeless tobacco: Never Used   Vaping Use    Vaping Use: Never used   Substance and Sexual Activity    Alcohol use: Yes     Comment:    Occasional    Drug use: Not Currently    Sexual activity: Not Currently     Partners: Female     Birth control/protection: Condom Male   Other Topics Concern    Not on file   Social History Narrative    · Most recent tobacco use screenin-      · Do you currently or have you served in [employer]:   No · Diet:   Regular        · Caffeine intake:   Occasional      · Guns present in home:   No      · Seat belts used routinely:   Yes      · Sunscreen used routinely:   No      · Smoke alarm in home: Yes      · Advance directive:   No      · Live alone or with others:   with others      · Are there stairs in your home: Yes      · International travel:   denies      · Pets:   No      · Deaf or serious difficulty hearing:   No      · Blind or serious difficulty seeing:   No      · Difficulty concentrating, remembering or making decisions:   No      · Difficulty walking or climbing stairs:   No      · Difficulty dressing or bathing:   No      · Difficulty doing errands alone:   No      Social Determinants of Health     Financial Resource Strain: Not on file   Food Insecurity: Not on file   Transportation Needs: Not on file   Physical Activity: Not on file   Stress: Not on file   Social Connections: Not on file   Intimate Partner Violence: Not on file   Housing Stability: Not on file      Current Medications:     Current Outpatient Medications   Medication Sig Dispense Refill    albuterol (PROVENTIL HFA,VENTOLIN HFA) 90 mcg/act inhaler Inhale 2 puffs every 6 (six) hours as needed for wheezing or shortness of breath 6 7 g 3    amLODIPine (NORVASC) 10 mg tablet Take 1 tablet (10 mg total) by mouth daily 90 tablet 2    atorvastatin (LIPITOR) 20 mg tablet Take 1 tablet (20 mg total) by mouth daily 90 tablet 1    fluticasone (FLONASE) 50 mcg/act nasal spray 1 spray into each nostril daily 9 9 mL 1    fluticasone (Flovent HFA) 110 MCG/ACT inhaler Inhale 2 puffs 2 (two) times a day Rinse mouth after use   12 g 1    levothyroxine 25 mcg tablet Take 1 tablet (25 mcg total) by mouth daily 90 tablet 1    lisinopril (ZESTRIL) 20 mg tablet Take 1 tablet (20 mg total) by mouth daily 90 tablet 1    Menthol-Methyl Salicylate (ZACH NESS GREASELESS) 10-15 % greaseless cream Apply topically daily at bedtime 30 g 0     No current facility-administered medications for this visit  Allergies: Allergies   Allergen Reactions    Nuts - Food Allergy Swelling     Lip swelling      Physical Exam:     /78 (BP Location: Right arm, Patient Position: Sitting, Cuff Size: Standard)   Pulse 90   Temp 98 7 °F (37 1 °C) (Temporal)   Resp 22   Ht 5' 3" (1 6 m)   Wt 69 kg (152 lb 1 oz)   SpO2 97%   BMI 26 94 kg/m²     Physical Exam  Constitutional:       Appearance: Normal appearance  HENT:      Head: Normocephalic  Comments: Poor dentition  Right Ear: Tympanic membrane, ear canal and external ear normal       Left Ear: Tympanic membrane, ear canal and external ear normal       Nose: Nose normal  No congestion or rhinorrhea  Mouth/Throat:      Mouth: Mucous membranes are moist       Pharynx: Oropharynx is clear  Eyes:      Conjunctiva/sclera: Conjunctivae normal       Pupils: Pupils are equal, round, and reactive to light  Neck:      Vascular: No carotid bruit  Cardiovascular:      Rate and Rhythm: Normal rate and regular rhythm  Pulses: Normal pulses  Heart sounds: Normal heart sounds  No murmur heard  No gallop  Pulmonary:      Effort: Pulmonary effort is normal       Breath sounds: No wheezing, rhonchi or rales  Abdominal:      General: Abdomen is flat  Bowel sounds are normal  There is no distension  Palpations: Abdomen is soft  There is no mass  Tenderness: There is no abdominal tenderness  There is no guarding or rebound  Musculoskeletal:      Cervical back: No tenderness  Comments: Upper and lower extremity strength intact  No lower leg swelling  Neurological:      Mental Status: He is alert            Jenny Yadav DO  [facility]

## 2022-07-06 NOTE — ASSESSMENT & PLAN NOTE
Lab Results   Component Value Date    EGFR 79 08/10/2021    EGFR 91 06/04/2021    CREATININE 1 21 08/10/2021    CREATININE 1 08 06/04/2021     Will repeat CMP and do microalbumin/creatinine urine ration and Protein/Cr ratio to assess kidney function

## 2022-07-06 NOTE — ASSESSMENT & PLAN NOTE
Asthma well controlled  Only uses albuterol when he is around triggers, not using albuterol daily  Continue current regimen  Refilled albuterol and Flovent

## 2022-07-06 NOTE — ASSESSMENT & PLAN NOTE
Blood pressure stable in office: 134/78  Continue Lisinopril 20mg daily and Amlodipine 10mg daily  Refilled Lisinopril and Amlodipine

## 2022-07-06 NOTE — PATIENT INSTRUCTIONS

## 2022-07-06 NOTE — ASSESSMENT & PLAN NOTE
Patient has had a trend of hypokalemia 3 4>3 0>2 9 (06/21)  Magnesium slightly elevated at 2 9 on 08/21  Will repeat labs

## 2022-07-07 ENCOUNTER — TELEPHONE (OUTPATIENT)
Dept: FAMILY MEDICINE CLINIC | Facility: CLINIC | Age: 52
End: 2022-07-07

## 2022-07-07 NOTE — TELEPHONE ENCOUNTER
Reduced transit fare for persons with disabilities form to be completed in folder in preceptor room   Thank you

## 2022-08-16 ENCOUNTER — LAB (OUTPATIENT)
Dept: LAB | Facility: CLINIC | Age: 52
End: 2022-08-16
Payer: COMMERCIAL

## 2022-08-16 DIAGNOSIS — E78.5 HYPERLIPIDEMIA, UNSPECIFIED HYPERLIPIDEMIA TYPE: ICD-10-CM

## 2022-08-16 DIAGNOSIS — E03.9 HYPOTHYROIDISM, UNSPECIFIED TYPE: ICD-10-CM

## 2022-08-16 DIAGNOSIS — I10 ESSENTIAL HYPERTENSION: ICD-10-CM

## 2022-08-16 LAB
ALBUMIN SERPL BCP-MCNC: 3.9 G/DL (ref 3.5–5)
ALP SERPL-CCNC: 104 U/L (ref 46–116)
ALT SERPL W P-5'-P-CCNC: 52 U/L (ref 12–78)
ANION GAP SERPL CALCULATED.3IONS-SCNC: 1 MMOL/L (ref 4–13)
AST SERPL W P-5'-P-CCNC: 26 U/L (ref 5–45)
BILIRUB SERPL-MCNC: 0.78 MG/DL (ref 0.2–1)
BUN SERPL-MCNC: 14 MG/DL (ref 5–25)
CALCIUM SERPL-MCNC: 9.6 MG/DL (ref 8.3–10.1)
CHLORIDE SERPL-SCNC: 108 MMOL/L (ref 96–108)
CHOLEST SERPL-MCNC: 135 MG/DL
CO2 SERPL-SCNC: 30 MMOL/L (ref 21–32)
CREAT SERPL-MCNC: 1.15 MG/DL (ref 0.6–1.3)
CREAT UR-MCNC: 252 MG/DL
CREAT UR-MCNC: 252 MG/DL
GFR SERPL CREATININE-BSD FRML MDRD: 72 ML/MIN/1.73SQ M
GLUCOSE P FAST SERPL-MCNC: 120 MG/DL (ref 65–99)
HDLC SERPL-MCNC: 44 MG/DL
LDLC SERPL CALC-MCNC: 78 MG/DL (ref 0–100)
MAGNESIUM SERPL-MCNC: 2.1 MG/DL (ref 1.6–2.6)
MICROALBUMIN UR-MCNC: 498 MG/L (ref 0–20)
MICROALBUMIN/CREAT 24H UR: 198 MG/G CREATININE (ref 0–30)
NONHDLC SERPL-MCNC: 91 MG/DL
POTASSIUM SERPL-SCNC: 3.4 MMOL/L (ref 3.5–5.3)
PROT SERPL-MCNC: 8.2 G/DL (ref 6.4–8.4)
PROT UR-MCNC: 90 MG/DL
PROT/CREAT UR: 0.36 MG/G{CREAT} (ref 0–0.1)
SODIUM SERPL-SCNC: 139 MMOL/L (ref 135–147)
TRIGL SERPL-MCNC: 66 MG/DL
TSH SERPL DL<=0.05 MIU/L-ACNC: 1.54 UIU/ML (ref 0.45–4.5)

## 2022-08-16 PROCEDURE — 80061 LIPID PANEL: CPT

## 2022-08-16 PROCEDURE — 83735 ASSAY OF MAGNESIUM: CPT

## 2022-08-16 PROCEDURE — 84156 ASSAY OF PROTEIN URINE: CPT

## 2022-08-16 PROCEDURE — 82043 UR ALBUMIN QUANTITATIVE: CPT

## 2022-08-16 PROCEDURE — 84443 ASSAY THYROID STIM HORMONE: CPT

## 2022-08-16 PROCEDURE — 82570 ASSAY OF URINE CREATININE: CPT

## 2022-08-16 PROCEDURE — 36415 COLL VENOUS BLD VENIPUNCTURE: CPT

## 2022-08-16 PROCEDURE — 80053 COMPREHEN METABOLIC PANEL: CPT

## 2022-08-22 NOTE — RESULT ENCOUNTER NOTE
Thyroid levels are normal, therefore continue current levothyroxine medication as prescribed for hypothyroidism  Lipid panel is normal, therefore continue cholesterol medications as prescribed with lifestyle modifications  Potassium levels are slightly low but Magnesium levels are normal, will continue to monitor these lab results in 3 months  Chronic kidney disease remains within Stage 2 CKD with GFR 72, protein-to-creatinine ratio is elevated to 0 36, and microalbumin is elevated to 198, most likely due to his Stage 2 CKD and will be his baseline to assess his future kidney function  Fasting glucose was elevated to 120 and elevated microalbumin:creatinine ratio, indicating impaired fasting glucose  Will follow up with an office visit to review theses results and go over lifestyle modifications and then obtaining an HbgA1c level before the following visit three months later

## 2022-09-21 ENCOUNTER — OFFICE VISIT (OUTPATIENT)
Dept: FAMILY MEDICINE CLINIC | Facility: CLINIC | Age: 52
End: 2022-09-21
Payer: COMMERCIAL

## 2022-09-21 VITALS
OXYGEN SATURATION: 100 % | HEIGHT: 63 IN | RESPIRATION RATE: 16 BRPM | BODY MASS INDEX: 26.22 KG/M2 | TEMPERATURE: 97.2 F | SYSTOLIC BLOOD PRESSURE: 122 MMHG | HEART RATE: 90 BPM | DIASTOLIC BLOOD PRESSURE: 80 MMHG | WEIGHT: 148 LBS

## 2022-09-21 DIAGNOSIS — N18.2 CKD (CHRONIC KIDNEY DISEASE) STAGE 2, GFR 60-89 ML/MIN: ICD-10-CM

## 2022-09-21 DIAGNOSIS — R73.03 PREDIABETES: ICD-10-CM

## 2022-09-21 DIAGNOSIS — B35.3 TINEA PEDIS OF LEFT FOOT: Primary | ICD-10-CM

## 2022-09-21 DIAGNOSIS — I10 ESSENTIAL HYPERTENSION: ICD-10-CM

## 2022-09-21 DIAGNOSIS — N18.2 CHRONIC KIDNEY DISEASE (CKD) STAGE G2/A2, MILDLY DECREASED GLOMERULAR FILTRATION RATE (GFR) BETWEEN 60-89 ML/MIN/1.73 SQUARE METER AND ALBUMINURIA CREATININE RATIO BETWEEN 30-299 MG/G: ICD-10-CM

## 2022-09-21 DIAGNOSIS — E78.5 HYPERLIPIDEMIA, UNSPECIFIED HYPERLIPIDEMIA TYPE: ICD-10-CM

## 2022-09-21 DIAGNOSIS — J30.1 ALLERGIC RHINITIS DUE TO POLLEN, UNSPECIFIED SEASONALITY: ICD-10-CM

## 2022-09-21 DIAGNOSIS — J45.20 MILD INTERMITTENT ASTHMA WITHOUT COMPLICATION: ICD-10-CM

## 2022-09-21 DIAGNOSIS — E03.9 HYPOTHYROIDISM, UNSPECIFIED TYPE: ICD-10-CM

## 2022-09-21 DIAGNOSIS — M79.10 MUSCLE ACHE: ICD-10-CM

## 2022-09-21 LAB — SL AMB POCT HEMOGLOBIN AIC: 5.7 (ref ?–6.5)

## 2022-09-21 PROCEDURE — 83036 HEMOGLOBIN GLYCOSYLATED A1C: CPT

## 2022-09-21 PROCEDURE — 99214 OFFICE O/P EST MOD 30 MIN: CPT

## 2022-09-21 RX ORDER — ATORVASTATIN CALCIUM 20 MG/1
20 TABLET, FILM COATED ORAL DAILY
Qty: 90 TABLET | Refills: 1 | Status: SHIPPED | OUTPATIENT
Start: 2022-09-21

## 2022-09-21 RX ORDER — LEVOTHYROXINE SODIUM 0.03 MG/1
25 TABLET ORAL DAILY
Qty: 90 TABLET | Refills: 1 | Status: SHIPPED | OUTPATIENT
Start: 2022-09-21 | End: 2022-09-21

## 2022-09-21 RX ORDER — ALBUTEROL SULFATE 90 UG/1
2 AEROSOL, METERED RESPIRATORY (INHALATION) EVERY 6 HOURS PRN
Qty: 6.7 G | Refills: 3 | Status: SHIPPED | OUTPATIENT
Start: 2022-09-21 | End: 2022-09-21

## 2022-09-21 RX ORDER — FLUTICASONE PROPIONATE 50 MCG
1 SPRAY, SUSPENSION (ML) NASAL DAILY
Qty: 9.9 ML | Refills: 1 | Status: SHIPPED | OUTPATIENT
Start: 2022-09-21 | End: 2022-09-23 | Stop reason: SDUPTHER

## 2022-09-21 RX ORDER — AMLODIPINE BESYLATE 10 MG/1
10 TABLET ORAL DAILY
Qty: 90 TABLET | Refills: 2 | Status: SHIPPED | OUTPATIENT
Start: 2022-09-21 | End: 2022-09-21

## 2022-09-21 RX ORDER — LISINOPRIL 20 MG/1
20 TABLET ORAL DAILY
Qty: 90 TABLET | Refills: 1 | Status: SHIPPED | OUTPATIENT
Start: 2022-09-21 | End: 2022-09-23 | Stop reason: SDUPTHER

## 2022-09-21 RX ORDER — NYSTATIN 100000 U/G
CREAM TOPICAL 2 TIMES DAILY
Qty: 30 G | Refills: 0 | Status: SHIPPED | OUTPATIENT
Start: 2022-09-21 | End: 2022-09-21

## 2022-09-21 RX ORDER — ALBUTEROL SULFATE 90 UG/1
2 AEROSOL, METERED RESPIRATORY (INHALATION) EVERY 6 HOURS PRN
Qty: 6.7 G | Refills: 3 | Status: SHIPPED | OUTPATIENT
Start: 2022-09-21 | End: 2022-09-23 | Stop reason: SDUPTHER

## 2022-09-21 RX ORDER — FLUTICASONE PROPIONATE 110 UG/1
2 AEROSOL, METERED RESPIRATORY (INHALATION) 2 TIMES DAILY
Qty: 12 G | Refills: 1 | Status: SHIPPED | OUTPATIENT
Start: 2022-09-21 | End: 2022-09-21

## 2022-09-21 RX ORDER — NYSTATIN 100000 U/G
CREAM TOPICAL 2 TIMES DAILY
Qty: 30 G | Refills: 0 | Status: SHIPPED | OUTPATIENT
Start: 2022-09-21 | End: 2022-09-23 | Stop reason: SDUPTHER

## 2022-09-21 RX ORDER — FLUTICASONE PROPIONATE 50 MCG
1 SPRAY, SUSPENSION (ML) NASAL DAILY
Qty: 9.9 ML | Refills: 1 | Status: SHIPPED | OUTPATIENT
Start: 2022-09-21 | End: 2022-09-21

## 2022-09-21 RX ORDER — FLUTICASONE PROPIONATE 110 UG/1
2 AEROSOL, METERED RESPIRATORY (INHALATION) 2 TIMES DAILY
Qty: 12 G | Refills: 1 | Status: SHIPPED | OUTPATIENT
Start: 2022-09-21 | End: 2022-09-23 | Stop reason: SDUPTHER

## 2022-09-21 RX ORDER — AMLODIPINE BESYLATE 10 MG/1
10 TABLET ORAL DAILY
Qty: 90 TABLET | Refills: 2 | Status: SHIPPED | OUTPATIENT
Start: 2022-09-21

## 2022-09-21 RX ORDER — MENTHOL 1.4 %
ADHESIVE PATCH, MEDICATED TOPICAL
Qty: 30 G | Refills: 0 | Status: SHIPPED | OUTPATIENT
Start: 2022-09-21 | End: 2022-09-21

## 2022-09-21 RX ORDER — LISINOPRIL 20 MG/1
20 TABLET ORAL DAILY
Qty: 90 TABLET | Refills: 1 | Status: SHIPPED | OUTPATIENT
Start: 2022-09-21 | End: 2022-09-21

## 2022-09-21 RX ORDER — MENTHOL 1.4 %
ADHESIVE PATCH, MEDICATED TOPICAL
Qty: 30 G | Refills: 0 | Status: SHIPPED | OUTPATIENT
Start: 2022-09-21

## 2022-09-21 RX ORDER — ATORVASTATIN CALCIUM 20 MG/1
20 TABLET, FILM COATED ORAL DAILY
Qty: 90 TABLET | Refills: 1 | Status: SHIPPED | OUTPATIENT
Start: 2022-09-21 | End: 2022-09-21

## 2022-09-21 RX ORDER — LEVOTHYROXINE SODIUM 0.03 MG/1
25 TABLET ORAL DAILY
Qty: 90 TABLET | Refills: 1 | Status: SHIPPED | OUTPATIENT
Start: 2022-09-21 | End: 2022-09-23 | Stop reason: SDUPTHER

## 2022-09-21 NOTE — ASSESSMENT & PLAN NOTE
Asthma well controlled  Only uses albuterol when he is around triggers, not using albuterol daily  Plan:  -Continue current regimen   -Refilled albuterol and Flovent

## 2022-09-21 NOTE — ASSESSMENT & PLAN NOTE
Potassium slightly lower at 3 5  Discussed eating potassium rich foods and overall healthy lifestyle changes   Handout was given to patient    Plan:  -Refilled Bengay  -monitor K in 3 months with dietary changes

## 2022-09-21 NOTE — PATIENT INSTRUCTIONS
Use Nystatin cream on left foot 2 times per day  Eucerin cream use at night time on your feet or any dry   Ha1c is 5 7  Please watch diet to include more vegetables, and decrease portions of carbohydrates    Fasting (no food) blood work done prior to the 3 month follow-up (end of December)    Eat potassium rich foods in hand-out

## 2022-09-21 NOTE — ASSESSMENT & PLAN NOTE
New progressively worsening left foot itchiness and scabbing  Ddx includes tinea pedis vs dry skin, less likely contact dermatitis vs psoriasis vs eczema  Denies pain  Only symptom is itching       Plan:  -Nystatin cream twice daily  -Eucerin cream once daily for Mositurizing dry skin

## 2022-09-21 NOTE — ASSESSMENT & PLAN NOTE
Slight decline in patient's kidney function from prior labs, GFR: 91>79>72  With elevated microalbumin at 498, indicating suspicion for prediabetes  POC HA1c: 5 7, confirming pre-diabetes  Patient on an ACE inhibitor for renal protection  Discussed lifestyle modifications and changes, including avoiding sugary drinks, increasing fruits and vegetables, decreasing carbohydrates  Patient was given a handout at checkout with foods to eat  Discussed continuing current exercise       Plan:  Recommend healthy lifestyle changes  Follow-up in 3 months for kidney function and fasting BMP

## 2022-09-21 NOTE — ASSESSMENT & PLAN NOTE
Most recent TSH 1 54 on 08/16/22, which is at goal (between 1-2, not higher than 2 5)   Stable on levothyroxine 25    Plan:  - Refill levothyroxine  -Follow-up visit in 3 months for kidney function

## 2022-09-21 NOTE — ASSESSMENT & PLAN NOTE
Cholesterol controlled on atorvastatin 20mg   Last Lipid panel 22, cholesterol: 135, T, HDL: 44, LDL 87    The 10-year ASCVD risk score (Acacia Carmona et al , 2013) is: 8 5%    Values used to calculate the score:      Age: 46 years      Sex: Male      Is Non- : Yes      Diabetic: No      Tobacco smoker: No      Systolic Blood Pressure: 560 mmHg      Is BP treated: Yes      HDL Cholesterol: 44 mg/dL      Total Cholesterol: 135 mg/dL    Plan:  -Refill and continue Atorvastatin 20mg daily

## 2022-09-21 NOTE — PROGRESS NOTES
Name: Leslie Pérez      : 1970      MRN: [id number]  Encounter Provider: Hollis Anne DO  Encounter Date: 2022   Encounter department: [facility]    Assessment & Plan     1  Tinea pedis of left foot  Assessment & Plan:  New progressively worsening left foot itchiness and scabbing  Ddx includes tinea pedis vs dry skin, less likely contact dermatitis vs psoriasis vs eczema  Denies pain  Only symptom is itching  Plan:  -Nystatin cream twice daily  -Eucerin cream once daily for Mositurizing dry skin      Orders:  -     nystatin (MYCOSTATIN) cream; Apply topically 2 (two) times a day    2  Essential hypertension  Assessment & Plan:  Blood pressure stable in office: 122/80, BUN:14, Cr: 1 15, GFR: 72  Slight decline in patient's kidney function from prior labs, GFR: 90>79>72  Protein/Cr: 0 36  Microalbumin was elevated at 498  Patient on an ACE inhibitor for renal protection  Plan:  Continue Lisinopril 20mg daily and Amlodipine 10mg daily  Refilled Lisinopril and Amlodipine today  Follow-up in 3 months for kidney function     Orders:  -     Basic metabolic panel; Future; Expected date: 10/21/2022  -     amLODIPine (NORVASC) 10 mg tablet; Take 1 tablet (10 mg total) by mouth daily  -     lisinopril (ZESTRIL) 20 mg tablet; Take 1 tablet (20 mg total) by mouth daily    3  Hypothyroidism, unspecified type  Assessment & Plan:  Most recent TSH 1 54 on 22, which is at goal (between 1-2, not higher than 2 5)  Stable on levothyroxine 25    Plan:  - Refill levothyroxine  -Follow-up visit in 3 months for kidney function     Orders:  -     levothyroxine 25 mcg tablet; Take 1 tablet (25 mcg total) by mouth daily    4  Mild intermittent asthma without complication  Assessment & Plan:  Asthma well controlled  Only uses albuterol when he is around triggers, not using albuterol daily  Plan:  -Continue current regimen   -Refilled albuterol and Flovent       Orders:  - albuterol (PROVENTIL HFA,VENTOLIN HFA) 90 mcg/act inhaler; Inhale 2 puffs every 6 (six) hours as needed for wheezing or shortness of breath  -     fluticasone (Flovent HFA) 110 MCG/ACT inhaler; Inhale 2 puffs 2 (two) times a day Rinse mouth after use  5  Allergic rhinitis due to pollen, unspecified seasonality  -     fluticasone (FLONASE) 50 mcg/act nasal spray; 1 spray into each nostril daily    6  Hyperlipidemia, unspecified hyperlipidemia type  Assessment & Plan:  Cholesterol controlled on atorvastatin 20mg  Last Lipid panel 22, cholesterol: 135, T, HDL: 44, LDL 87    The 10-year ASCVD risk score (Deidra Chowdhury et al , 2013) is: 8 5%    Values used to calculate the score:      Age: 46 years      Sex: Male      Is Non- : Yes      Diabetic: No      Tobacco smoker: No      Systolic Blood Pressure: 274 mmHg      Is BP treated: Yes      HDL Cholesterol: 44 mg/dL      Total Cholesterol: 135 mg/dL    Plan:  -Refill and continue Atorvastatin 20mg daily        Orders:  -     atorvastatin (LIPITOR) 20 mg tablet; Take 1 tablet (20 mg total) by mouth daily    7  Muscle ache  Assessment & Plan:  Potassium slightly lower at 3 5  Discussed eating potassium rich foods and overall healthy lifestyle changes  Handout was given to patient    Plan:  -Refilled Bengay  -monitor K in 3 months with dietary changes       Orders:  -     Menthol-Methyl Salicylate (ZACH NESS GREASELESS) 10-15 % greaseless cream; Apply topically daily at bedtime    8  CKD (chronic kidney disease) stage 2, GFR 60-89 ml/min  -     Basic metabolic panel; Future; Expected date: 10/21/2022    9  Prediabetes  Assessment & Plan:  Slight decline in patient's kidney function from prior labs, GFR: 91>79>72  With elevated microalbumin at 498, indicating suspicion for prediabetes  POC HA1c: 5 7, confirming pre-diabetes  Patient on an ACE inhibitor for renal protection   Discussed lifestyle modifications and changes, including avoiding sugary drinks, increasing fruits and vegetables, decreasing carbohydrates  Patient was given a handout at checkout with foods to eat  Discussed continuing current exercise  Plan:  Recommend healthy lifestyle changes  Follow-up in 3 months for kidney function and fasting BMP       10  Chronic kidney disease (CKD) stage G2/A2, mildly decreased glomerular filtration rate (GFR) between 60-89 mL/min/1 73 square meter and albuminuria creatinine ratio between  mg/g  Assessment & Plan:  Lab Results   Component Value Date    EGFR 72 08/16/2022    EGFR 79 08/10/2021    EGFR 91 06/04/2021    CREATININE 1 15 08/16/2022    CREATININE 1 21 08/10/2021    CREATININE 1 08 06/04/2021   Slight decline in patient's kidney function from prior labs, GFR: 91>79>72  Protein/Cr: 0 36  Microalbumin was elevated at 498, suspicious for prediabetes  POC HA1c: 5 7  Patient on an ACE inhibitor for renal protection  Plan:  Continue ACE-inhibitor for renal protection  Follow-up in 3 months for kidney function       BMI Counseling: Body mass index is 26 22 kg/m²  The BMI is above normal  Nutrition recommendations include decreasing portion sizes, encouraging healthy choices of fruits and vegetables, decreasing fast food intake, consuming healthier snacks, limiting drinks that contain sugar and moderation in carbohydrate intake  Exercise recommendations include moderate physical activity 150 minutes/week and exercising 3-5 times per week  Rationale for BMI follow-up plan is due to patient being overweight or obese  Nutrition Assessment and Intervention:     Nutrition patient handout reviewed with patient        Subjective      HPI   47 yo -american male with pmhx of asthma, hypertension, hyperlipidemia, hypothyroidism, and mild intellectual disability presents for a follow-up and to review  Labs  He reports new left foot itchiness and scabbing that has been ongoing for 1 month   He reports putting hydrocortisone cream 1% but it has not helped  He reports having a prior episode like this in the past and recalled a cream helping it       Asthma:  Asthma is under control  He uses Flovent HFA 2 puffs daily and albuterol as needed  Only uses inhaler occasionally when needed around smoking  Denies any wheezing or SOB      Hypertension:  Currently Lisinopril 20 mg and Amlodipine 10mg  Denies any headaches, chest pain, SOB       Hyperlipidemia:  On atorvastatin 20mg  Last Lipid panel 22, cholesterol: 135, T, HDL: 44, LDL 87     Hypothyroidism:  On Levothyroxine 25mcg  Denies any hypothyroid symptoms or side effects wit medication  Last TSH 1 54     Patient has cologuard kit at home and has not sent the sample in yet  But is planning on doing so  Review of Systems   Constitutional: Negative for chills and fever  HENT: Negative for ear pain and sore throat  Eyes: Negative for pain and visual disturbance  Respiratory: Negative for cough and shortness of breath  Cardiovascular: Negative for chest pain and palpitations  Gastrointestinal: Negative for abdominal pain and vomiting  Genitourinary: Negative for dysuria and hematuria  Musculoskeletal: Negative for arthralgias and back pain  Skin: Positive for rash (left foot)  Negative for color change  Neurological: Negative for seizures and syncope  All other systems reviewed and are negative        Current Outpatient Medications on File Prior to Visit   Medication Sig    [DISCONTINUED] albuterol (PROVENTIL HFA,VENTOLIN HFA) 90 mcg/act inhaler Inhale 2 puffs every 6 (six) hours as needed for wheezing or shortness of breath    [DISCONTINUED] amLODIPine (NORVASC) 10 mg tablet Take 1 tablet (10 mg total) by mouth daily    [DISCONTINUED] atorvastatin (LIPITOR) 20 mg tablet Take 1 tablet (20 mg total) by mouth daily    [DISCONTINUED] fluticasone (FLONASE) 50 mcg/act nasal spray 1 spray into each nostril daily    [DISCONTINUED] fluticasone (Flovent HFA) 110 MCG/ACT inhaler Inhale 2 puffs 2 (two) times a day Rinse mouth after use   [DISCONTINUED] levothyroxine 25 mcg tablet Take 1 tablet (25 mcg total) by mouth daily    [DISCONTINUED] lisinopril (ZESTRIL) 20 mg tablet Take 1 tablet (20 mg total) by mouth daily    [DISCONTINUED] Menthol-Methyl Salicylate (ZACH NESS GREASELESS) 10-15 % greaseless cream Apply topically daily at bedtime       Objective     /80   Pulse 90   Temp (!) 97 2 °F (36 2 °C)   Resp 16   Ht 5' 3" (1 6 m)   Wt 67 1 kg (148 lb)   SpO2 100%   BMI 26 22 kg/m²     Physical Exam  Constitutional:       Appearance: Normal appearance  HENT:      Head: Normocephalic  Cardiovascular:      Rate and Rhythm: Normal rate and regular rhythm  Pulses: Normal pulses  Heart sounds: Normal heart sounds  No murmur heard  No gallop  Pulmonary:      Effort: Pulmonary effort is normal  No respiratory distress  Breath sounds: Normal breath sounds  No wheezing, rhonchi or rales  Abdominal:      General: Abdomen is flat  Bowel sounds are normal  There is no distension  Palpations: Abdomen is soft  There is no mass  Tenderness: There is no abdominal tenderness  There is no guarding or rebound  Musculoskeletal:      Right lower leg: No edema  Left lower leg: No edema  Skin:     General: Skin is warm and dry  Findings: Rash present  Comments: Left foot white scaling rash over and between digits 1-3  Lesions in the toes are open due to scratching  Neurological:      Mental Status: He is alert     Psychiatric:         Mood and Affect: Mood normal          Behavior: Behavior normal        Marilyn Lezama DO

## 2022-09-21 NOTE — ASSESSMENT & PLAN NOTE
Blood pressure stable in office: 122/80, BUN:14, Cr: 1 15, GFR: 72  Slight decline in patient's kidney function from prior labs, GFR: 90>79>72  Protein/Cr: 0 36  Microalbumin was elevated at 498  Patient on an ACE inhibitor for renal protection  Plan:  Continue Lisinopril 20mg daily and Amlodipine 10mg daily     Refilled Lisinopril and Amlodipine today  Follow-up in 3 months for kidney function

## 2022-09-21 NOTE — ASSESSMENT & PLAN NOTE
Lab Results   Component Value Date    EGFR 72 08/16/2022    EGFR 79 08/10/2021    EGFR 91 06/04/2021    CREATININE 1 15 08/16/2022    CREATININE 1 21 08/10/2021    CREATININE 1 08 06/04/2021   Slight decline in patient's kidney function from prior labs, GFR: 91>79>72  Protein/Cr: 0 36  Microalbumin was elevated at 498, suspicious for prediabetes  POC HA1c: 5 7  Patient on an ACE inhibitor for renal protection      Plan:  Continue ACE-inhibitor for renal protection  Follow-up in 3 months for kidney function

## 2022-09-23 DIAGNOSIS — J45.20 MILD INTERMITTENT ASTHMA WITHOUT COMPLICATION: ICD-10-CM

## 2022-09-23 DIAGNOSIS — E03.9 HYPOTHYROIDISM, UNSPECIFIED TYPE: ICD-10-CM

## 2022-09-23 DIAGNOSIS — J30.1 ALLERGIC RHINITIS DUE TO POLLEN, UNSPECIFIED SEASONALITY: ICD-10-CM

## 2022-09-23 DIAGNOSIS — B35.3 TINEA PEDIS OF LEFT FOOT: ICD-10-CM

## 2022-09-23 DIAGNOSIS — I10 ESSENTIAL HYPERTENSION: ICD-10-CM

## 2022-09-23 RX ORDER — FLUTICASONE PROPIONATE 110 UG/1
2 AEROSOL, METERED RESPIRATORY (INHALATION) 2 TIMES DAILY
Qty: 12 G | Refills: 1 | Status: SHIPPED | OUTPATIENT
Start: 2022-09-23

## 2022-09-23 RX ORDER — FLUTICASONE PROPIONATE 50 MCG
1 SPRAY, SUSPENSION (ML) NASAL DAILY
Qty: 9.9 ML | Refills: 1 | Status: SHIPPED | OUTPATIENT
Start: 2022-09-23

## 2022-09-23 RX ORDER — LISINOPRIL 20 MG/1
20 TABLET ORAL DAILY
Qty: 90 TABLET | Refills: 1 | Status: SHIPPED | OUTPATIENT
Start: 2022-09-23

## 2022-09-23 RX ORDER — NYSTATIN 100000 U/G
CREAM TOPICAL 2 TIMES DAILY
Qty: 30 G | Refills: 0 | Status: SHIPPED | OUTPATIENT
Start: 2022-09-23

## 2022-09-23 RX ORDER — ALBUTEROL SULFATE 90 UG/1
2 AEROSOL, METERED RESPIRATORY (INHALATION) EVERY 6 HOURS PRN
Qty: 6.7 G | Refills: 3 | Status: SHIPPED | OUTPATIENT
Start: 2022-09-23

## 2022-09-23 RX ORDER — LEVOTHYROXINE SODIUM 0.03 MG/1
25 TABLET ORAL DAILY
Qty: 90 TABLET | Refills: 1 | Status: SHIPPED | OUTPATIENT
Start: 2022-09-23

## 2022-10-12 PROBLEM — Z00.00 HEALTHCARE MAINTENANCE: Status: RESOLVED | Noted: 2021-08-02 | Resolved: 2022-10-12

## 2022-10-18 ENCOUNTER — VBI (OUTPATIENT)
Dept: ADMINISTRATIVE | Facility: OTHER | Age: 52
End: 2022-10-18

## 2022-12-16 ENCOUNTER — APPOINTMENT (OUTPATIENT)
Dept: LAB | Facility: CLINIC | Age: 52
End: 2022-12-16

## 2022-12-16 DIAGNOSIS — I10 ESSENTIAL HYPERTENSION: ICD-10-CM

## 2022-12-16 DIAGNOSIS — N18.2 CKD (CHRONIC KIDNEY DISEASE) STAGE 2, GFR 60-89 ML/MIN: ICD-10-CM

## 2022-12-17 LAB
ANION GAP SERPL CALCULATED.3IONS-SCNC: 2 MMOL/L (ref 4–13)
BUN SERPL-MCNC: 17 MG/DL (ref 5–25)
CALCIUM SERPL-MCNC: 9.1 MG/DL (ref 8.3–10.1)
CHLORIDE SERPL-SCNC: 107 MMOL/L (ref 96–108)
CO2 SERPL-SCNC: 31 MMOL/L (ref 21–32)
CREAT SERPL-MCNC: 1.21 MG/DL (ref 0.6–1.3)
GFR SERPL CREATININE-BSD FRML MDRD: 67 ML/MIN/1.73SQ M
GLUCOSE P FAST SERPL-MCNC: 91 MG/DL (ref 65–99)
POTASSIUM SERPL-SCNC: 3.6 MMOL/L (ref 3.5–5.3)
SODIUM SERPL-SCNC: 140 MMOL/L (ref 135–147)

## 2022-12-20 NOTE — ASSESSMENT & PLAN NOTE
Cholesterol controlled on atorvastatin 20mg  Last Lipid panel 22, cholesterol: 135, T, HDL: 44, LDL 87   Next panel due     The 10-year ASCVD risk score (Danna CAMPUZANO, et al , 2019) is: 8 9%    Values used to calculate the score:      Age: 45 years      Sex: Male      Is Non- : Yes      Diabetic: No      Tobacco smoker: No      Systolic Blood Pressure: 459 mmHg      Is BP treated: Yes      HDL Cholesterol: 44 mg/dL      Total Cholesterol: 135 mg/dL    Plan:  -Refill and continue Atorvastatin 20mg daily

## 2022-12-20 NOTE — PROGRESS NOTES
Name: Sabiha Bailey      : 1970      MRN: [id number]  Encounter Provider: Emigdio Styles DO  Encounter Date: 2022   Encounter department: [facility]    Assessment & Plan     1  CKD (chronic kidney disease) stage 2, GFR 60-89 ml/min  -     Comprehensive metabolic panel; Future  -     Microalbumin / creatinine urine ratio    2  Chronic kidney disease (CKD) stage G2/A2, mildly decreased glomerular filtration rate (GFR) between 60-89 mL/min/1 73 square meter and albuminuria creatinine ratio between  mg/g  Assessment & Plan:  Lab Results   Component Value Date    EGFR 67 2022    EGFR 72 2022    EGFR 79 08/10/2021    CREATININE 1 21 2022    CREATININE 1 15 2022    CREATININE 1 21 08/10/2021   Slight decline in patient's kidney function from prior labs,   GFR: 91>79>72>67  Protein/Cr: 0 36  Microalbumin was elevated at 498, suspicious for prediabetes  POC HA1c: 5 7  Patient on an ACE inhibitor for renal protection  Continue to trend Cr, BUN, and egfr  Consider other etiologies of worsening CKD if lab values continue to trend down, like MM  Blood pressure is controlled and prediabetes are controlled with diet  Plan:  · Continue ACE-inhibitor for renal protection  · Repeat CMP and Urine Microalbumin/Creatine ratio in 3 months  · Educated on how to read sodium labels  · Recommend less than 2,000mg of sodium per day  · Reviewed foods to avoid in diet including:    -[place] and chicken and fish are good   -Avoid ham, baloni, roast beef, salami,    -Avoid juices, Sugary drinks, soda    -Try using less sodium canned vegetables   · Recommend 64 oz of water intake per day  · Follow-up in 3 months for kidney function       3  Hypothyroidism, unspecified type  Assessment & Plan:  Most recent TSH 1 54 on 22, which is at goal (between 1-2, not higher than 2 5)  Stable on levothyroxine 25  Next TSH due     Lab Results   Component Value Date [id number] 1 540 2022     Plan:  - Continue levothyroxine-refilled during visit  -Follow-up visit in 3 months     Orders:  -     levothyroxine 25 mcg tablet; Take 1 tablet (25 mcg total) by mouth daily    4  Mild intermittent asthma without complication  Assessment & Plan:  Asthma well controlled  Only uses albuterol when he is around triggers, not using albuterol daily  Plan:  -Continue current regimen   -Refilled albuterol and Flovent  Orders:  -     fluticasone (Flovent HFA) 110 MCG/ACT inhaler; Inhale 2 puffs 2 (two) times a day Rinse mouth after use  -     albuterol (PROVENTIL HFA,VENTOLIN HFA) 90 mcg/act inhaler; Inhale 2 puffs every 6 (six) hours as needed for wheezing or shortness of breath    5  Allergic rhinitis due to pollen, unspecified seasonality  Assessment & Plan:  Refilled Flonase    Orders:  -     fluticasone (FLONASE) 50 mcg/act nasal spray; 1 spray into each nostril daily    6  Hyperlipidemia, unspecified hyperlipidemia type  Assessment & Plan:  Cholesterol controlled on atorvastatin 20mg  Last Lipid panel 22, cholesterol: 135, T, HDL: 44, LDL 87  Next panel due     The 10-year ASCVD risk score (Danna CAMPUZANO, et al , 2019) is: 8 9%    Values used to calculate the score:      Age: 45 years      Sex: Male      Is Non- : Yes      Diabetic: No      Tobacco smoker: No      Systolic Blood Pressure: 621 mmHg      Is BP treated: Yes      HDL Cholesterol: 44 mg/dL      Total Cholesterol: 135 mg/dL    Plan:  -Refill and continue Atorvastatin 20mg daily        Orders:  -     atorvastatin (LIPITOR) 20 mg tablet; Take 1 tablet (20 mg total) by mouth daily    7  Essential hypertension  Assessment & Plan:  Blood pressure at goal in office: 124/90          ,   Last labs 22: BUN:17, Cr: 1 21, GFR: 67    Slight decline in patient's kidney function from prior labs, GFR: 90>79>72>67  Protein/Cr: 0 36  Microalbumin was elevated at 498   Last A1c 5 7 on 09/21/22  Patient on an ACE inhibitor for renal protection  Plan:  · Continue Lisinopril 20mg daily and Amlodipine 10mg daily  · Continue ACE-inhibitor for renal protection  · Repeat CMP and Urine Microalbumin/Creatine ratio in 3 months  · Educated on how to read sodium labels  · Recommend less than 2,000mg of sodium per day  · Reviewed foods to avoid in diet including:    -[place] and chicken and fish are good   -Avoid ham, baloni, roast beef, salami,    -Avoid juices, Sugary drinks, soda    -Try using less sodium canned vegetables   · Recommend 64 oz of water intake per day  · Follow-up in 3 months for kidney function and labs    Orders:  -     lisinopril (ZESTRIL) 20 mg tablet; Take 1 tablet (20 mg total) by mouth daily  -     amLODIPine (NORVASC) 10 mg tablet; Take 1 tablet (10 mg total) by mouth daily    8  Tinea pedis of left foot  Assessment & Plan:  Improvement with Nystatin and Eucerin cream  Ddx includes tinea pedis vs dry skin  Plan:  -Switch Nystatin cream to econazole cream   -Eucerin cream once daily for Mositurizing dry skin      Orders:  -     econazole nitrate 1 % cream; Apply topically daily    9  Encounter for immunization  -     influenza vaccine, quadrivalent, recombinant, PF, 0 5 mL, for patients 18 yr+ (FLUBLOK)    10  Prediabetes  -     POCT hemoglobin A1c    BMI Counseling: There is no height or weight on file to calculate BMI  The BMI is above normal  Nutrition recommendations include decreasing portion sizes, encouraging healthy choices of fruits and vegetables, decreasing fast food intake, consuming healthier snacks, limiting drinks that contain sugar and moderation in carbohydrate intake  Exercise recommendations include moderate physical activity 150 minutes/week and exercising 3-5 times per week  Rationale for BMI follow-up plan is due to patient being overweight or obese       Nutrition Assessment and Intervention:     Nutrition patient handout reviewed with patient Other interventions: Reviewed nutrition labels and sodium intake      Subjective      HPI   47 yo -american male with pmhx of asthma, hypertension, hyperlipidemia, hypothyroidism, and mild intellectual disability presents for a follow-up and to review labs       Asthma:  Asthma is under control  He uses Flovent HFA 2 puffs daily and albuterol as needed  Only uses inhaler occasionally when needed around smoking  Denies any wheezing or SOB      Hypertension:  Currently Lisinopril 20 mg and Amlodipine 10mg  Denies any headaches, chest pain, SOB       Hyperlipidemia:  On atorvastatin 20mg  Last Lipid panel 22, cholesterol: 135, T, HDL: 44, LDL 87     Hypothyroidism:  On Levothyroxine 25mcg  Denies any hypothyroid symptoms or side effects wit medication  Last TSH 1 54 on 22    Tinea Pedis:  Report his left foot itchiness and scabbing has improved since last visit  He has been using Nystatin cream BID and Eucerin cream daily       Patient has cologuard kit at home and has not sent the sample in yet  But is planning on doing so  Review of Systems   Constitutional: Negative for chills and fever  HENT: Negative for ear pain and sore throat  Eyes: Negative for pain and visual disturbance  Respiratory: Negative for cough and shortness of breath  Cardiovascular: Negative for chest pain and palpitations  Gastrointestinal: Negative for abdominal pain and vomiting  Genitourinary: Negative for dysuria and hematuria  Musculoskeletal: Negative for arthralgias and back pain  Skin: Negative for color change and rash (left foot rash has improved)  Neurological: Negative for seizures and syncope  All other systems reviewed and are negative        Current Outpatient Medications on File Prior to Visit   Medication Sig   • Menthol-Methyl Salicylate (ZACH NESS GREASELESS) 10-15 % greaseless cream Apply topically daily at bedtime   • nystatin (MYCOSTATIN) cream Apply topically 2 (two) times a day   • [DISCONTINUED] albuterol (PROVENTIL HFA,VENTOLIN HFA) 90 mcg/act inhaler Inhale 2 puffs every 6 (six) hours as needed for wheezing or shortness of breath   • [DISCONTINUED] amLODIPine (NORVASC) 10 mg tablet Take 1 tablet (10 mg total) by mouth daily       Objective     /90 (BP Location: Left arm, Patient Position: Sitting, Cuff Size: Standard)   Pulse 83   Temp (!) 97 3 °F (36 3 °C) (Temporal)   Ht 5' 3 6" (1 615 m)   Wt 69 1 kg (152 lb 6 4 oz)   SpO2 99%   BMI 26 49 kg/m²     Physical Exam  Constitutional:       Appearance: Normal appearance  HENT:      Head: Normocephalic  Cardiovascular:      Rate and Rhythm: Normal rate and regular rhythm  Pulses: Normal pulses  Heart sounds: Normal heart sounds  No murmur heard  No gallop  Pulmonary:      Effort: Pulmonary effort is normal  No respiratory distress  Breath sounds: Normal breath sounds  No wheezing, rhonchi or rales  Abdominal:      General: Abdomen is flat  Bowel sounds are normal  There is no distension  Palpations: Abdomen is soft  There is no mass  Tenderness: There is no abdominal tenderness  There is no guarding or rebound  Musculoskeletal:      Right lower leg: No edema  Left lower leg: No edema  Skin:     General: Skin is warm and dry  Findings: Rash present  Comments: Signficant improvement in left foot rash  Dry skin is still present over the 1st-2nd digits  No lesions inbetween the toes  Neurological:      Mental Status: He is alert     Psychiatric:         Mood and Affect: Mood normal          Behavior: Behavior normal        Ameena England DO

## 2022-12-20 NOTE — ASSESSMENT & PLAN NOTE
Blood pressure at goal in office: 124/90          ,   Last labs 12/16/22: BUN:17, Cr: 1 21, GFR: 67    Slight decline in patient's kidney function from prior labs, GFR: 90>79>72>67  Protein/Cr: 0 36  Microalbumin was elevated at 498  Last A1c 5 7 on 09/21/22  Patient on an ACE inhibitor for renal protection  Plan:  · Continue Lisinopril 20mg daily and Amlodipine 10mg daily  · Continue ACE-inhibitor for renal protection  · Repeat CMP and Urine Microalbumin/Creatine ratio in 3 months  · Educated on how to read sodium labels  · Recommend less than 2,000mg of sodium per day     · Reviewed foods to avoid in diet including:    -[place] and chicken and fish are good   -Avoid ham, baloni, roast beef, salami,    -Avoid juices, Sugary drinks, soda    -Try using less sodium canned vegetables   · Recommend 64 oz of water intake per day  · Follow-up in 3 months for kidney function and labs

## 2022-12-20 NOTE — ASSESSMENT & PLAN NOTE
Most recent TSH 1 54 on 08/16/22, which is at goal (between 1-2, not higher than 2 5)  Stable on levothyroxine 25  Next TSH due 08/23    Lab Results   Component Value Date    [id number] 1 540 08/16/2022     Plan:  - Continue levothyroxine-refilled during visit  -Follow-up visit in 3 months

## 2022-12-20 NOTE — ASSESSMENT & PLAN NOTE
Improvement with Nystatin and Eucerin cream  Ddx includes tinea pedis vs dry skin       Plan:  -Switch Nystatin cream to econazole cream   -Eucerin cream once daily for Mositurizing dry skin

## 2022-12-20 NOTE — ASSESSMENT & PLAN NOTE
Lab Results   Component Value Date    EGFR 67 12/16/2022    EGFR 72 08/16/2022    EGFR 79 08/10/2021    CREATININE 1 21 12/16/2022    CREATININE 1 15 08/16/2022    CREATININE 1 21 08/10/2021   Slight decline in patient's kidney function from prior labs,   GFR: 91>79>72>67  Protein/Cr: 0 36  Microalbumin was elevated at 498, suspicious for prediabetes  POC HA1c: 5 7  Patient on an ACE inhibitor for renal protection  Continue to trend Cr, BUN, and egfr  Consider other etiologies of worsening CKD if lab values continue to trend down, like MM  Blood pressure is controlled and prediabetes are controlled with diet  Plan:  · Continue ACE-inhibitor for renal protection  · Repeat CMP and Urine Microalbumin/Creatine ratio in 3 months  · Educated on how to read sodium labels  · Recommend less than 2,000mg of sodium per day     · Reviewed foods to avoid in diet including:    -[place] and chicken and fish are good   -Avoid ham, baloni, roast beef, salami,    -Avoid juices, Sugary drinks, soda    -Try using less sodium canned vegetables   · Recommend 64 oz of water intake per day  · Follow-up in 3 months for kidney function

## 2022-12-21 ENCOUNTER — OFFICE VISIT (OUTPATIENT)
Dept: FAMILY MEDICINE CLINIC | Facility: CLINIC | Age: 52
End: 2022-12-21

## 2022-12-21 VITALS
WEIGHT: 152.4 LBS | HEIGHT: 64 IN | HEART RATE: 83 BPM | SYSTOLIC BLOOD PRESSURE: 124 MMHG | BODY MASS INDEX: 26.02 KG/M2 | TEMPERATURE: 97.3 F | DIASTOLIC BLOOD PRESSURE: 90 MMHG | OXYGEN SATURATION: 99 %

## 2022-12-21 DIAGNOSIS — J30.1 ALLERGIC RHINITIS DUE TO POLLEN, UNSPECIFIED SEASONALITY: ICD-10-CM

## 2022-12-21 DIAGNOSIS — N18.2 CHRONIC KIDNEY DISEASE (CKD) STAGE G2/A2, MILDLY DECREASED GLOMERULAR FILTRATION RATE (GFR) BETWEEN 60-89 ML/MIN/1.73 SQUARE METER AND ALBUMINURIA CREATININE RATIO BETWEEN 30-299 MG/G: ICD-10-CM

## 2022-12-21 DIAGNOSIS — E78.5 HYPERLIPIDEMIA, UNSPECIFIED HYPERLIPIDEMIA TYPE: ICD-10-CM

## 2022-12-21 DIAGNOSIS — N18.2 CKD (CHRONIC KIDNEY DISEASE) STAGE 2, GFR 60-89 ML/MIN: Primary | ICD-10-CM

## 2022-12-21 DIAGNOSIS — Z23 ENCOUNTER FOR IMMUNIZATION: ICD-10-CM

## 2022-12-21 DIAGNOSIS — R73.03 PREDIABETES: ICD-10-CM

## 2022-12-21 DIAGNOSIS — I10 ESSENTIAL HYPERTENSION: ICD-10-CM

## 2022-12-21 DIAGNOSIS — B35.3 TINEA PEDIS OF LEFT FOOT: ICD-10-CM

## 2022-12-21 DIAGNOSIS — J45.20 MILD INTERMITTENT ASTHMA WITHOUT COMPLICATION: ICD-10-CM

## 2022-12-21 DIAGNOSIS — E03.9 HYPOTHYROIDISM, UNSPECIFIED TYPE: ICD-10-CM

## 2022-12-21 DIAGNOSIS — Z11.4 ENCOUNTER FOR SCREENING FOR HIV: ICD-10-CM

## 2022-12-21 RX ORDER — AMLODIPINE BESYLATE 10 MG/1
10 TABLET ORAL DAILY
Qty: 90 TABLET | Refills: 2 | Status: SHIPPED | OUTPATIENT
Start: 2022-12-21

## 2022-12-21 RX ORDER — LISINOPRIL 20 MG/1
20 TABLET ORAL DAILY
Qty: 90 TABLET | Refills: 1 | Status: SHIPPED | OUTPATIENT
Start: 2022-12-21

## 2022-12-21 RX ORDER — NYSTATIN 100000 U/G
CREAM TOPICAL 2 TIMES DAILY
Qty: 30 G | Refills: 0 | Status: CANCELLED | OUTPATIENT
Start: 2022-12-21

## 2022-12-21 RX ORDER — ATORVASTATIN CALCIUM 20 MG/1
20 TABLET, FILM COATED ORAL DAILY
Qty: 90 TABLET | Refills: 1 | Status: SHIPPED | OUTPATIENT
Start: 2022-12-21

## 2022-12-21 RX ORDER — FLUTICASONE PROPIONATE 110 UG/1
2 AEROSOL, METERED RESPIRATORY (INHALATION) 2 TIMES DAILY
Qty: 12 G | Refills: 1 | Status: SHIPPED | OUTPATIENT
Start: 2022-12-21

## 2022-12-21 RX ORDER — LEVOTHYROXINE SODIUM 0.03 MG/1
25 TABLET ORAL DAILY
Qty: 90 TABLET | Refills: 1 | Status: SHIPPED | OUTPATIENT
Start: 2022-12-21

## 2022-12-21 RX ORDER — FLUTICASONE PROPIONATE 50 MCG
1 SPRAY, SUSPENSION (ML) NASAL DAILY
Qty: 9.9 ML | Refills: 1 | Status: SHIPPED | OUTPATIENT
Start: 2022-12-21

## 2022-12-21 RX ORDER — ALBUTEROL SULFATE 90 UG/1
2 AEROSOL, METERED RESPIRATORY (INHALATION) EVERY 6 HOURS PRN
Qty: 6.7 G | Refills: 3 | Status: SHIPPED | OUTPATIENT
Start: 2022-12-21

## 2022-12-21 NOTE — PATIENT INSTRUCTIONS
Goal: to eat 2-3 vegetables per day  Drink 8 cups of 8oz of water per day (total water per day should be 64oz of water)  Eat less than 2,000 mg of Sodium per day   Be sure to check can goods for how much salt    -[place] ([facility]) and chicken and fish are good   -Avoid ham, baloni, roast beef, salami,    -Avoid juices, Sugary drinks, soda    -Try using less sodium canned vegetables   Repeat blood work in 3 months-fasting blood work

## 2023-01-20 ENCOUNTER — VBI (OUTPATIENT)
Dept: ADMINISTRATIVE | Facility: OTHER | Age: 53
End: 2023-01-20

## 2023-03-27 ENCOUNTER — APPOINTMENT (OUTPATIENT)
Dept: LAB | Facility: CLINIC | Age: 53
End: 2023-03-27

## 2023-03-27 DIAGNOSIS — Z11.4 ENCOUNTER FOR SCREENING FOR HIV: ICD-10-CM

## 2023-03-27 DIAGNOSIS — N18.2 CKD (CHRONIC KIDNEY DISEASE) STAGE 2, GFR 60-89 ML/MIN: ICD-10-CM

## 2023-03-27 LAB
ALBUMIN SERPL BCP-MCNC: 4.1 G/DL (ref 3.5–5)
ALP SERPL-CCNC: 100 U/L (ref 46–116)
ALT SERPL W P-5'-P-CCNC: 85 U/L (ref 12–78)
ANION GAP SERPL CALCULATED.3IONS-SCNC: 0 MMOL/L (ref 4–13)
AST SERPL W P-5'-P-CCNC: 50 U/L (ref 5–45)
BILIRUB SERPL-MCNC: 0.87 MG/DL (ref 0.2–1)
BUN SERPL-MCNC: 17 MG/DL (ref 5–25)
CALCIUM SERPL-MCNC: 9.4 MG/DL (ref 8.3–10.1)
CHLORIDE SERPL-SCNC: 108 MMOL/L (ref 96–108)
CO2 SERPL-SCNC: 31 MMOL/L (ref 21–32)
CREAT SERPL-MCNC: 1.1 MG/DL (ref 0.6–1.3)
CREAT UR-MCNC: 202 MG/DL
GFR SERPL CREATININE-BSD FRML MDRD: 75 ML/MIN/1.73SQ M
GLUCOSE SERPL-MCNC: 96 MG/DL (ref 65–140)
MICROALBUMIN UR-MCNC: 303 MG/L (ref 0–20)
MICROALBUMIN/CREAT 24H UR: 150 MG/G CREATININE (ref 0–30)
POTASSIUM SERPL-SCNC: 3.9 MMOL/L (ref 3.5–5.3)
PROT SERPL-MCNC: 8.5 G/DL (ref 6.4–8.4)
SODIUM SERPL-SCNC: 139 MMOL/L (ref 135–147)

## 2023-03-28 LAB — HIV 1+2 AB+HIV1 P24 AG SERPL QL IA: NON REACTIVE

## 2023-03-29 ENCOUNTER — OFFICE VISIT (OUTPATIENT)
Dept: FAMILY MEDICINE CLINIC | Facility: CLINIC | Age: 53
End: 2023-03-29

## 2023-03-29 VITALS
OXYGEN SATURATION: 97 % | TEMPERATURE: 98.5 F | HEART RATE: 97 BPM | BODY MASS INDEX: 26.29 KG/M2 | WEIGHT: 154 LBS | DIASTOLIC BLOOD PRESSURE: 92 MMHG | SYSTOLIC BLOOD PRESSURE: 136 MMHG | HEIGHT: 64 IN

## 2023-03-29 DIAGNOSIS — E78.5 HYPERLIPIDEMIA, UNSPECIFIED HYPERLIPIDEMIA TYPE: ICD-10-CM

## 2023-03-29 DIAGNOSIS — N18.2 CHRONIC KIDNEY DISEASE (CKD) STAGE G2/A2, MILDLY DECREASED GLOMERULAR FILTRATION RATE (GFR) BETWEEN 60-89 ML/MIN/1.73 SQUARE METER AND ALBUMINURIA CREATININE RATIO BETWEEN 30-299 MG/G: Primary | ICD-10-CM

## 2023-03-29 DIAGNOSIS — J45.20 MILD INTERMITTENT ASTHMA WITHOUT COMPLICATION: ICD-10-CM

## 2023-03-29 DIAGNOSIS — B35.3 TINEA PEDIS OF LEFT FOOT: ICD-10-CM

## 2023-03-29 DIAGNOSIS — I10 ESSENTIAL HYPERTENSION: ICD-10-CM

## 2023-03-29 DIAGNOSIS — R79.89 ELEVATED LFTS: ICD-10-CM

## 2023-03-29 DIAGNOSIS — M79.10 MUSCLE ACHE: ICD-10-CM

## 2023-03-29 DIAGNOSIS — E03.9 HYPOTHYROIDISM, UNSPECIFIED TYPE: ICD-10-CM

## 2023-03-29 DIAGNOSIS — J30.1 ALLERGIC RHINITIS DUE TO POLLEN, UNSPECIFIED SEASONALITY: ICD-10-CM

## 2023-03-29 RX ORDER — ATORVASTATIN CALCIUM 20 MG/1
20 TABLET, FILM COATED ORAL DAILY
Qty: 90 TABLET | Refills: 1 | Status: SHIPPED | OUTPATIENT
Start: 2023-03-29

## 2023-03-29 RX ORDER — MENTHOL 1.4 %
ADHESIVE PATCH, MEDICATED TOPICAL
Qty: 30 G | Refills: 0 | Status: SHIPPED | OUTPATIENT
Start: 2023-03-29

## 2023-03-29 RX ORDER — ALBUTEROL SULFATE 90 UG/1
2 AEROSOL, METERED RESPIRATORY (INHALATION) EVERY 6 HOURS PRN
Qty: 6.7 G | Refills: 3 | Status: SHIPPED | OUTPATIENT
Start: 2023-03-29

## 2023-03-29 RX ORDER — NYSTATIN 100000 U/G
CREAM TOPICAL 2 TIMES DAILY
Qty: 30 G | Refills: 0 | Status: SHIPPED | OUTPATIENT
Start: 2023-03-29

## 2023-03-29 RX ORDER — LISINOPRIL 20 MG/1
20 TABLET ORAL DAILY
Qty: 90 TABLET | Refills: 1 | Status: SHIPPED | OUTPATIENT
Start: 2023-03-29

## 2023-03-29 RX ORDER — AMLODIPINE BESYLATE 10 MG/1
10 TABLET ORAL DAILY
Qty: 90 TABLET | Refills: 2 | Status: SHIPPED | OUTPATIENT
Start: 2023-03-29

## 2023-03-29 RX ORDER — FLUTICASONE PROPIONATE 50 MCG
1 SPRAY, SUSPENSION (ML) NASAL DAILY
Qty: 9.9 ML | Refills: 1 | Status: SHIPPED | OUTPATIENT
Start: 2023-03-29

## 2023-03-29 RX ORDER — FLUTICASONE PROPIONATE 110 UG/1
2 AEROSOL, METERED RESPIRATORY (INHALATION) 2 TIMES DAILY
Qty: 12 G | Refills: 1 | Status: SHIPPED | OUTPATIENT
Start: 2023-03-29

## 2023-03-29 RX ORDER — LEVOTHYROXINE SODIUM 0.03 MG/1
25 TABLET ORAL DAILY
Qty: 90 TABLET | Refills: 1 | Status: SHIPPED | OUTPATIENT
Start: 2023-03-29

## 2023-03-29 NOTE — ASSESSMENT & PLAN NOTE
Lab Results   Component Value Date    EGFR 75 03/27/2023    EGFR 67 12/16/2022    EGFR 72 08/16/2022    CREATININE 1 10 03/27/2023    CREATININE 1 21 12/16/2022    CREATININE 1 15 08/16/2022   Kidney function improved from previous labs! GFR: 91>79>72>67>75  Microalbumin: Creatine improved from 198 to 150  Last HA1c: 5 7 on 12/2023  Patient on an ACE inhibitor for renal protection  Continue to trend Cr, BUN, and egfr  Consider other etiologies of worsening CKD if lab values continue to trend down, like MM  Blood pressure is controlled and prediabetes are controlled with diet  Plan:  · Continue ACE-inhibitor for renal protection  · Repeat BMP in 6 months  · Recommend less than 2,000mg of sodium per day     · Reviewed foods to avoid in diet including:    -[place] and chicken and fish are good   -Avoid ham, baloni, roast beef, salami,    -Avoid juices, Sugary drinks, soda    -Try using less sodium canned vegetables   · Recommend 64 oz of water intake per day  · Follow-up in 3 months for kidney function

## 2023-03-29 NOTE — PROGRESS NOTES
Name: Eric Hensley      : 1970      MRN: [id number]  Encounter Provider: Nicole Marshall DO  Encounter Date: 3/29/2023   Encounter department: [facility]    Assessment & Plan     1  Chronic kidney disease (CKD) stage G2/A2, mildly decreased glomerular filtration rate (GFR) between 60-89 mL/min/1 73 square meter and albuminuria creatinine ratio between  mg/g  Assessment & Plan:  Lab Results   Component Value Date    EGFR 75 2023    EGFR 67 2022    EGFR 72 2022    CREATININE 1 10 2023    CREATININE 1 21 2022    CREATININE 1 15 2022   Kidney function improved from previous labs! GFR: 91>79>72>67>75  Microalbumin: Creatine improved from 198 to 150  Last HA1c: 5 7 on 2023  Patient on an ACE inhibitor for renal protection  Continue to trend Cr, BUN, and egfr  Consider other etiologies of worsening CKD if lab values continue to trend down, like MM  Blood pressure is controlled and prediabetes are controlled with diet  Plan:  · Continue ACE-inhibitor for renal protection  · Repeat BMP in 6 months  · Recommend less than 2,000mg of sodium per day  · Reviewed foods to avoid in diet including:    -[place] and chicken and fish are good   -Avoid ham, baloni, roast beef, salami,    -Avoid juices, Sugary drinks, soda    -Try using less sodium canned vegetables   · Recommend 64 oz of water intake per day  · Follow-up in 3 months for kidney function     Orders:  -     Basic metabolic panel; Future; Expected date: 2023    2  Tinea pedis of left foot  Assessment & Plan:  Improvement with Nystatin and Eucerin cream  Ddx includes tinea pedis vs dry skin  Plan:  -Continue Nystatin cream   -Eucerin cream once daily for Mositurizing dry skin      Orders:  -     nystatin (MYCOSTATIN) cream; Apply topically 2 (two) times a day    3   Muscle ache  -     Menthol-Methyl Salicylate (ZACH NESS GREASELESS) 10-15 % greaseless cream; Apply topically daily at bedtime    4  Essential hypertension  Assessment & Plan:  Blood pressure is slightly increased from goal in office: 136/92, Goal<130/80        ,   Improvement in Kidney function from previous blood work: BUN:17, Cr: 1 1, GFR: 75  Trend of GFR: 90>79>72>67>75  Microalbumin: Creatine ratio improved to 150  Last A1c 5 7 on 09/21/22  Patient on an ACE inhibitor for renal protection  Plan:  · Continue Lisinopril 20mg daily and Amlodipine 10mg daily  · Continue ACE-inhibitor for renal protection  · Repeat BMP in 6 months  · Recommend less than 2,000mg of sodium per day  · Reviewed foods to avoid in diet including:    -[place] and chicken and fish are good   -Avoid ham, baloni, roast beef, salami,    -Avoid juices, Sugary drinks, soda    -Try using less sodium canned vegetables   · Recommend 64 oz of water intake per day  · Follow-up in 3 months     Orders:  -     lisinopril (ZESTRIL) 20 mg tablet; Take 1 tablet (20 mg total) by mouth daily  -     amLODIPine (NORVASC) 10 mg tablet; Take 1 tablet (10 mg total) by mouth daily  -     Basic metabolic panel; Future; Expected date: 09/01/2023    5  Hypothyroidism, unspecified type  Assessment & Plan:  Most recent TSH 1 54 on 08/16/22, which is at goal (between 1-2, not higher than 2 5)  Stable on levothyroxine 25  Next TSH due 08/23  Lab Results   Component Value Date    [id number] 1 540 08/16/2022     Plan:  - Continue levothyroxine-refilled during visit  -Repeat TSH labs in 09/23  -Follow-up visit in 3 months     Orders:  -     levothyroxine 25 mcg tablet; Take 1 tablet (25 mcg total) by mouth daily  -     TSH, 3rd generation with Free T4 reflex; Future; Expected date: 09/01/2023    6  Mild intermittent asthma without complication  Assessment & Plan:  Asthma well controlled  Only uses albuterol when he is around triggers, not using albuterol daily  Plan:  -Continue current regimen   -Refilled albuterol and Flovent       Orders:  -     fluticasone (Flovent HFA) 110 MCG/ACT inhaler; Inhale 2 puffs 2 (two) times a day Rinse mouth after use  -     albuterol (PROVENTIL HFA,VENTOLIN HFA) 90 mcg/act inhaler; Inhale 2 puffs every 6 (six) hours as needed for wheezing or shortness of breath    7  Allergic rhinitis due to pollen, unspecified seasonality  Assessment & Plan:  Refilled Flonase    Orders:  -     fluticasone (FLONASE) 50 mcg/act nasal spray; 1 spray into each nostril daily    8  Hyperlipidemia, unspecified hyperlipidemia type  Assessment & Plan:  Cholesterol controlled on atorvastatin 20mg  Last Lipid panel 22, cholesterol: 135, T, HDL: 44, LDL 87  Next panel due     The 10-year ASCVD risk score (Danna CAMPUZANO, et al , 2019) is: 10 9%    Values used to calculate the score:      Age: 45 years      Sex: Male      Is Non- : Yes      Diabetic: No      Tobacco smoker: No      Systolic Blood Pressure: 252 mmHg      Is BP treated: Yes      HDL Cholesterol: 44 mg/dL      Total Cholesterol: 135 mg/dL    Plan:  -Refill and continue Atorvastatin 20mg daily        Orders:  -     atorvastatin (LIPITOR) 20 mg tablet; Take 1 tablet (20 mg total) by mouth daily  -     Lipid panel; Future; Expected date: 2023    9  Elevated LFTs  Assessment & Plan:  Recent Labs     23  1136   AST 50*   ALT 85*   ALKPHOS 100   TBILI 0 87     Slightly elevated AST and ALT patient denies any abdominal pain, or concerns for hemolysis  Denies any alcohol use  Plan:   Repeat Liver function with hepatic function  If LFTs remain elevated, consider hemolysis work-up and/or RUQ     Orders:  -     Hepatic function panel; Future; Expected date: 2023  -     Hepatic function panel; Future; Expected date: 2023    BMI Counseling: There is no height or weight on file to calculate BMI   The BMI is above normal  Nutrition recommendations include decreasing portion sizes, encouraging healthy choices of fruits and vegetables, decreasing fast food intake, consuming healthier snacks, limiting drinks that contain sugar and moderation in carbohydrate intake  Exercise recommendations include moderate physical activity 150 minutes/week and exercising 3-5 times per week  Rationale for BMI follow-up plan is due to patient being overweight or obese  Depression Screening and Follow-up Plan: Patient was screened for depression during today's encounter  They screened negative with a PHQ-2 score of 0  Subjective        47 yo -american male with pmhx of asthma, hypertension, hyperlipidemia, hypothyroidism, and mild intellectual disability presents for a follow-up and to review labs       Reviewed lab work, including improving kidney function  However noted slightly elevated LFTS  Patient denies any abdominal pain, increased fatigue, alcohol use, right upper quadrant abdominal pain  Patient has been improving diet with CKD  He has been avoiding salt and sugary drinks  Hypertension:  Currently Lisinopril 20 mg and Amlodipine 10mg  Blood pressure slightly above goal, 136/92      Hyperlipidemia:  On atorvastatin 20mg  Last Lipid panel 22, cholesterol: 135, T, HDL: 44, LDL 87    Asthma:  Asthma is under control  He uses Flovent HFA 2 puffs daily and albuterol as needed  Only uses inhaler occasionally when needed around smoking, Once every 2 weeks  Patient uses Flonase nasal spray  Denies any wheezing or SOB  Hypothyroidism:  On Levothyroxine 25mcg  Denies any hypothyroid symptoms or side effects wit medication  Last TSH 1 54 on 22    Tinea Pedis:  Report his left foot itchiness and scabbing has improved since last visit  He has been using Nystatin cream BID and Eucerin cream daily       Patient has cologuard kit at home and has not sent the sample in yet  But is planning on doing so  Review of Systems   Constitutional: Negative for chills and fever  HENT: Negative for ear pain and sore throat      Eyes: Negative for pain "and visual disturbance  Respiratory: Negative for cough and shortness of breath  Cardiovascular: Negative for chest pain and palpitations  Gastrointestinal: Negative for abdominal pain, constipation, diarrhea and vomiting  Genitourinary: Negative for dysuria and hematuria  Musculoskeletal: Negative for arthralgias and back pain  Skin: Negative for color change and rash (left foot rash has improved)  Neurological: Negative for headaches  All other systems reviewed and are negative  Current Outpatient Medications on File Prior to Visit   Medication Sig   • [DISCONTINUED] albuterol (PROVENTIL HFA,VENTOLIN HFA) 90 mcg/act inhaler Inhale 2 puffs every 6 (six) hours as needed for wheezing or shortness of breath   • [DISCONTINUED] amLODIPine (NORVASC) 10 mg tablet Take 1 tablet (10 mg total) by mouth daily   • [DISCONTINUED] atorvastatin (LIPITOR) 20 mg tablet Take 1 tablet (20 mg total) by mouth daily   • [DISCONTINUED] econazole nitrate 1 % cream Apply topically daily   • [DISCONTINUED] fluticasone (FLONASE) 50 mcg/act nasal spray 1 spray into each nostril daily   • [DISCONTINUED] fluticasone (Flovent HFA) 110 MCG/ACT inhaler Inhale 2 puffs 2 (two) times a day Rinse mouth after use  • [DISCONTINUED] levothyroxine 25 mcg tablet Take 1 tablet (25 mcg total) by mouth daily   • [DISCONTINUED] lisinopril (ZESTRIL) 20 mg tablet Take 1 tablet (20 mg total) by mouth daily   • [DISCONTINUED] Menthol-Methyl Salicylate (ZACH NESS GREASELESS) 10-15 % greaseless cream Apply topically daily at bedtime   • [DISCONTINUED] nystatin (MYCOSTATIN) cream Apply topically 2 (two) times a day       Objective     /92 (BP Location: Left arm, Patient Position: Sitting, Cuff Size: Standard)   Pulse 97   Temp 98 5 °F (36 9 °C) (Tympanic)   Ht 5' 3 6\" (1 615 m)   Wt 69 9 kg (154 lb)   SpO2 97%   BMI 26 77 kg/m²     Physical Exam  Vitals and nursing note reviewed  Constitutional:       Appearance: Normal appearance   " HENT:      Head: Normocephalic  Cardiovascular:      Rate and Rhythm: Normal rate and regular rhythm  Pulses: Normal pulses  Heart sounds: Normal heart sounds  No murmur heard  No gallop  Pulmonary:      Effort: Pulmonary effort is normal  No respiratory distress  Breath sounds: Normal breath sounds  No wheezing, rhonchi or rales  Abdominal:      General: Abdomen is flat  Bowel sounds are normal  There is no distension  Palpations: Abdomen is soft  There is no mass  Tenderness: There is no abdominal tenderness  There is no guarding or rebound  Musculoskeletal:      Right lower leg: No edema  Left lower leg: No edema  Skin:     General: Skin is warm and dry  Findings: Rash present  Comments: Signficant improvement in left foot rash  Dry skin is still present over the 1st-2nd digits  No lesions inbetween the toes  Neurological:      Mental Status: He is alert     Psychiatric:         Mood and Affect: Mood normal          Behavior: Behavior normal        Mp Ambriz DO

## 2023-03-29 NOTE — ASSESSMENT & PLAN NOTE
Recent Labs     03/27/23  1136   AST 50*   ALT 85*   ALKPHOS 100   TBILI 0 87     Slightly elevated AST and ALT patient denies any abdominal pain, or concerns for hemolysis  Denies any alcohol use       Plan:   Repeat Liver function with hepatic function  If LFTs remain elevated, consider hemolysis work-up and/or RUQ US

## 2023-03-29 NOTE — PATIENT INSTRUCTIONS
Lower Back Exercises   AMBULATORY CARE:   Lower back exercises  help heal and strengthen your back muscles to prevent another injury  Ask your healthcare provider if you need to see a physical therapist for more advanced exercises  Seek care immediately if:   You have severe pain that prevents you from moving  Call your doctor if:   Your pain becomes worse  You have new pain  You have questions or concerns about your condition or care  Do lower back exercises safely:   Do the exercises on a mat or firm surface (not on a bed)  A firm surface will support your spine and prevent low back pain  Move slowly and smoothly  Avoid fast or jerky motions  Breathe normally  Do not hold your breath  Stop if you feel pain  It is normal to feel some discomfort at first, but you should not feel pain  Regular exercise will help decrease your discomfort over time  Lower back exercises: Your healthcare provider may recommend that you do back exercises 10 to 30 minutes each day  He or she may also recommend that you do exercises 1 to 3 times each day  Ask your provider which exercises are best for you and how often to do them  Ankle pumps:  Lie on your back  Move your foot up (with your toes pointing toward your head)  Then, move your foot down (with your toes pointing away from you)  Repeat this exercise 10 times on each side  Heel slides:  Lie on your back  Slowly bend one leg and then straighten it  Next, bend the other leg and then straighten it  Repeat 10 times on each side  Pelvic tilt:  Lie on your back with your knees bent and feet flat on the floor  Place your arms in a relaxed position beside your body  Tighten the muscles of your abdomen and flatten your back against the floor  Hold for 5 seconds  Repeat 5 times  Back stretch:  Lie on your back with your hands behind your head  Bend your knees and turn the lower half of your body to one side   Hold this position for 10 seconds  Repeat 3 times on each side  Straight leg raises:  Lie on your back with one leg straight  Bend the other knee  Tighten your abdomen and then slowly lift the straight leg up about 6 to 12 inches off the floor  Hold for 1 to 5 seconds  Lower your leg slowly  Repeat 10 times on each leg  Knee-to-chest:  Lie on your back with your knees bent and feet flat on the floor  Pull one of your knees toward your chest and hold it there for 5 seconds  Return your leg to the starting position  Lift the other knee toward your chest and hold for 5 seconds  Do this 5 times on each side  Cat and camel:  Place your hands and knees on the floor  Arch your back upward toward the ceiling and lower your head  Round out your spine as much as you can  Hold for 5 seconds  Lift your head upward and push your chest downward toward the floor  Hold for 5 seconds  Do 3 sets or as directed  Wall squats:  Stand with your back against a wall  Tighten the muscles of your abdomen  Slowly lower your body until your knees are bent at a 45 degree angle  Hold this position for 5 seconds  Slowly move back up to a standing position  Repeat 10 times  Curl up:  Lie on your back with your knees bent and feet flat on the floor  Place your hands, palms down, underneath the curve in your lower back  Next, with your elbows on the floor, lift your shoulders and chest 2 to 3 inches  Keep your head in line with your shoulders  Hold this position for 5 seconds  When you can do this exercise without pain for 10 to 15 seconds, you may add a rotation  While your shoulders and chest are lifted off the ground, turn slightly to the left and hold  Repeat on the other side  Bird dog:  Place your hands and knees on the floor  Keep your wrists directly below your shoulders and your knees directly below your hips  Pull your belly button in toward your spine  Do not flatten or arch your back  Tighten your abdominal muscles  Raise one arm straight out so that it is aligned with your head  Next, raise the leg opposite your arm  Hold this position for 15 seconds  Lower your arm and leg slowly and change sides  Do 5 sets  Follow up with your doctor as directed:  Write down your questions so you remember to ask them during your visits  © Copyright [employer] 2022 Information is for End User's use only and may not be sold, redistributed or otherwise used for commercial purposes  The above information is an  only  It is not intended as medical advice for individual conditions or treatments  Talk to your doctor, nurse or pharmacist before following any medical regimen to see if it is safe and effective for you

## 2023-03-30 NOTE — ASSESSMENT & PLAN NOTE
Most recent TSH 1 54 on 08/16/22, which is at goal (between 1-2, not higher than 2 5)  Stable on levothyroxine 25  Next TSH due 08/23    Lab Results   Component Value Date    [id number] 1 540 08/16/2022     Plan:  - Continue levothyroxine-refilled during visit  -Repeat TSH labs in 09/23  -Follow-up visit in 3 months

## 2023-03-30 NOTE — ASSESSMENT & PLAN NOTE
Cholesterol controlled on atorvastatin 20mg  Last Lipid panel 22, cholesterol: 135, T, HDL: 44, LDL 87   Next panel due     The 10-year ASCVD risk score (Danna CAMPUZANO, et al , 2019) is: 10 9%    Values used to calculate the score:      Age: 45 years      Sex: Male      Is Non- : Yes      Diabetic: No      Tobacco smoker: No      Systolic Blood Pressure: 002 mmHg      Is BP treated: Yes      HDL Cholesterol: 44 mg/dL      Total Cholesterol: 135 mg/dL    Plan:  -Refill and continue Atorvastatin 20mg daily

## 2023-03-30 NOTE — ASSESSMENT & PLAN NOTE
Improvement with Nystatin and Eucerin cream  Ddx includes tinea pedis vs dry skin       Plan:  -Continue Nystatin cream   -Eucerin cream once daily for Mositurizing dry skin

## 2023-03-30 NOTE — ASSESSMENT & PLAN NOTE
Blood pressure is slightly increased from goal in office: 136/92, Goal<130/80        ,   Improvement in Kidney function from previous blood work: BUN:17, Cr: 1 1, GFR: 75  Trend of GFR: 90>79>72>67>75  Microalbumin: Creatine ratio improved to 150  Last A1c 5 7 on 09/21/22  Patient on an ACE inhibitor for renal protection  Plan:  · Continue Lisinopril 20mg daily and Amlodipine 10mg daily  · Continue ACE-inhibitor for renal protection  · Repeat BMP in 6 months  · Recommend less than 2,000mg of sodium per day     · Reviewed foods to avoid in diet including:    -[place] and chicken and fish are good   -Avoid ham, baloni, roast beef, salami,    -Avoid juices, Sugary drinks, soda    -Try using less sodium canned vegetables   · Recommend 64 oz of water intake per day  · Follow-up in 3 months

## 2023-04-05 NOTE — RESULT ENCOUNTER NOTE
Slight increase in AST and ALT and total protein  Will recheck Hepatic function with next blood work  Kidney function improved  Discussed results with patients  Discussed continuing to make dietary changes for kidney disease  HIV screening negative

## 2023-06-28 ENCOUNTER — OFFICE VISIT (OUTPATIENT)
Dept: FAMILY MEDICINE CLINIC | Facility: CLINIC | Age: 53
End: 2023-06-28
Payer: COMMERCIAL

## 2023-06-28 VITALS
HEIGHT: 64 IN | DIASTOLIC BLOOD PRESSURE: 74 MMHG | BODY MASS INDEX: 25.16 KG/M2 | TEMPERATURE: 97.5 F | HEART RATE: 78 BPM | SYSTOLIC BLOOD PRESSURE: 126 MMHG | OXYGEN SATURATION: 98 % | WEIGHT: 147.4 LBS

## 2023-06-28 DIAGNOSIS — J45.20 MILD INTERMITTENT ASTHMA WITHOUT COMPLICATION: ICD-10-CM

## 2023-06-28 DIAGNOSIS — E78.5 HYPERLIPIDEMIA, UNSPECIFIED HYPERLIPIDEMIA TYPE: ICD-10-CM

## 2023-06-28 DIAGNOSIS — M19.072 OSTEOARTHRITIS OF BOTH ANKLES, UNSPECIFIED OSTEOARTHRITIS TYPE: ICD-10-CM

## 2023-06-28 DIAGNOSIS — E03.9 HYPOTHYROIDISM, UNSPECIFIED TYPE: ICD-10-CM

## 2023-06-28 DIAGNOSIS — I10 ESSENTIAL HYPERTENSION: Primary | ICD-10-CM

## 2023-06-28 DIAGNOSIS — J30.1 ALLERGIC RHINITIS DUE TO POLLEN, UNSPECIFIED SEASONALITY: ICD-10-CM

## 2023-06-28 DIAGNOSIS — B35.3 TINEA PEDIS OF LEFT FOOT: ICD-10-CM

## 2023-06-28 DIAGNOSIS — M79.10 MUSCLE ACHE: ICD-10-CM

## 2023-06-28 DIAGNOSIS — M19.071 OSTEOARTHRITIS OF BOTH ANKLES, UNSPECIFIED OSTEOARTHRITIS TYPE: ICD-10-CM

## 2023-06-28 DIAGNOSIS — R79.89 ELEVATED LFTS: ICD-10-CM

## 2023-06-28 PROCEDURE — 99213 OFFICE O/P EST LOW 20 MIN: CPT

## 2023-06-28 RX ORDER — MENTHOL 1.4 %
ADHESIVE PATCH, MEDICATED TOPICAL
Qty: 30 G | Refills: 0 | Status: SHIPPED | OUTPATIENT
Start: 2023-06-28

## 2023-06-28 RX ORDER — ALBUTEROL SULFATE 90 UG/1
2 AEROSOL, METERED RESPIRATORY (INHALATION) EVERY 6 HOURS PRN
Qty: 6.7 G | Refills: 3 | Status: SHIPPED | OUTPATIENT
Start: 2023-06-28

## 2023-06-28 RX ORDER — LISINOPRIL 20 MG/1
20 TABLET ORAL DAILY
Qty: 90 TABLET | Refills: 1 | Status: SHIPPED | OUTPATIENT
Start: 2023-06-28

## 2023-06-28 RX ORDER — ATORVASTATIN CALCIUM 20 MG/1
20 TABLET, FILM COATED ORAL DAILY
Qty: 90 TABLET | Refills: 1 | Status: SHIPPED | OUTPATIENT
Start: 2023-06-28

## 2023-06-28 RX ORDER — FLUTICASONE PROPIONATE 50 MCG
1 SPRAY, SUSPENSION (ML) NASAL DAILY
Qty: 9.9 ML | Refills: 1 | Status: SHIPPED | OUTPATIENT
Start: 2023-06-28

## 2023-06-28 RX ORDER — AMLODIPINE BESYLATE 10 MG/1
10 TABLET ORAL DAILY
Qty: 90 TABLET | Refills: 2 | Status: SHIPPED | OUTPATIENT
Start: 2023-06-28

## 2023-06-28 RX ORDER — LEVOTHYROXINE SODIUM 0.03 MG/1
25 TABLET ORAL DAILY
Qty: 90 TABLET | Refills: 1 | Status: SHIPPED | OUTPATIENT
Start: 2023-06-28

## 2023-06-28 RX ORDER — FLUTICASONE PROPIONATE 110 UG/1
2 AEROSOL, METERED RESPIRATORY (INHALATION) 2 TIMES DAILY
Qty: 12 G | Refills: 1 | Status: SHIPPED | OUTPATIENT
Start: 2023-06-28

## 2023-06-28 RX ORDER — NYSTATIN 100000 U/G
CREAM TOPICAL 2 TIMES DAILY
Qty: 30 G | Refills: 0 | Status: SHIPPED | OUTPATIENT
Start: 2023-06-28
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
## 2023-06-28 NOTE — PATIENT INSTRUCTIONS
Use Aquaphor cream to hep moisturizer skin   Makes sure to get blood work before next visit: fasting, no eating or drinking for 8 hours

## 2023-06-29 NOTE — ASSESSMENT & PLAN NOTE
Most recent TSH 1 54 on 08/16/22, which is at goal (between 1-2, not higher than 2 5)  Stable on levothyroxine 25  Next TSH due 08/23    Lab Results   Component Value Date    [id number] 1 540 08/16/2022     Plan:  - Continue levothyroxine-refilled during visit  -Repeat TSH labs in 08/23  -Follow-up visit in 3 months

## 2023-06-29 NOTE — ASSESSMENT & PLAN NOTE
Asthma well controlled  Only uses albuterol when he is around triggers, not using albuterol daily  Plan:  -Continue current regimen: Flovent BID  -Continue to rinse mouth after inhaler use  -Refilled albuterol and Flovent

## 2023-06-29 NOTE — ASSESSMENT & PLAN NOTE
Blood Pressure: 126/74, Goal<130/80-Blood pressure at goal  Improvement in Kidney function from previous blood work: BUN:17, Cr: 1 1, GFR: 75  Trend of GFR: 90>79>72>67>75  Microalbumin: Creatine ratio improved to 150  Last A1c 5 7 on 09/21/22  Patient on an ACE inhibitor for renal protection  Plan:  · Continue Lisinopril 20mg daily and Amlodipine 10mg daily  · Continue ACE-inhibitor for renal protection  · Repeat BMP in 3 months  · Recommend less than 2,000mg of sodium per day     · Follow-up in 3 months

## 2023-06-29 NOTE — ASSESSMENT & PLAN NOTE
Slightly elevated AST and ALT patient denies any abdominal pain, or concerns for hemolysis  Denies any alcohol use       Plan:   Repeat Liver function with hepatic function  If LFTs remain elevated, consider hemolysis work-up and/or RUQ US

## 2023-06-29 NOTE — ASSESSMENT & PLAN NOTE
Lab Results   Component Value Date    EGFR 75 03/27/2023    EGFR 67 12/16/2022    EGFR 72 08/16/2022    CREATININE 1 10 03/27/2023    CREATININE 1 21 12/16/2022    CREATININE 1 15 08/16/2022   Slight decline in patient's kidney function from prior labs,   GFR: 91>79>72>67  Protein/Cr: 0 36  Microalbumin was elevated at 498, suspicious for prediabetes  POC HA1c: 5 7  Patient on an ACE inhibitor for renal protection  Continue to trend Cr, BUN, and egfr  Consider other etiologies of worsening CKD if lab values continue to trend down, like MM  Blood pressure is controlled and prediabetes are controlled with diet       Plan:  · Continue ACE-inhibitor for renal protection  · Repeat CMP and Urine Microalbumin/Creatine ratio in 3 months  · Follow-up in 3 months for kidney function

## 2023-06-29 NOTE — ASSESSMENT & PLAN NOTE
Cholesterol controlled on atorvastatin 20mg  Last Lipid panel 22, cholesterol: 135, T, HDL: 44, LDL 87   Next panel due     The 10-year ASCVD risk score (Danna CAMPUZANO, et al , 2019) is: 9 5%    Values used to calculate the score:      Age: 45 years      Sex: Male      Is Non- : Yes      Diabetic: No      Tobacco smoker: No      Systolic Blood Pressure: 226 mmHg      Is BP treated: Yes      HDL Cholesterol: 44 mg/dL      Total Cholesterol: 135 mg/dL    Plan:  -Refill and continue Atorvastatin 20mg daily

## 2023-06-29 NOTE — ASSESSMENT & PLAN NOTE
Improvement with Nystatin and Eucerin cream  Ddx includes tinea pedis vs dry skin       Plan:  -Continue Nystatin cream   -Aquaphor or Eucerin cream once daily for Mositurizing dry skin

## 2023-06-29 NOTE — ASSESSMENT & PLAN NOTE
· Joint pain mainly in ankles b/l  ·  will trial Voltaren cream   · If pain persists, consider further work-up

## 2023-08-01 DIAGNOSIS — M19.071 OSTEOARTHRITIS OF BOTH ANKLES, UNSPECIFIED OSTEOARTHRITIS TYPE: ICD-10-CM

## 2023-08-01 DIAGNOSIS — M19.072 OSTEOARTHRITIS OF BOTH ANKLES, UNSPECIFIED OSTEOARTHRITIS TYPE: ICD-10-CM

## 2023-09-28 ENCOUNTER — APPOINTMENT (OUTPATIENT)
Dept: LAB | Facility: CLINIC | Age: 53
End: 2023-09-28
Payer: COMMERCIAL

## 2023-09-28 DIAGNOSIS — E78.5 HYPERLIPIDEMIA, UNSPECIFIED HYPERLIPIDEMIA TYPE: ICD-10-CM

## 2023-09-28 DIAGNOSIS — N18.2 CHRONIC KIDNEY DISEASE (CKD) STAGE G2/A2, MILDLY DECREASED GLOMERULAR FILTRATION RATE (GFR) BETWEEN 60-89 ML/MIN/1.73 SQUARE METER AND ALBUMINURIA CREATININE RATIO BETWEEN 30-299 MG/G: ICD-10-CM

## 2023-09-28 DIAGNOSIS — I10 ESSENTIAL HYPERTENSION: ICD-10-CM

## 2023-09-28 DIAGNOSIS — R79.89 ELEVATED LFTS: ICD-10-CM

## 2023-09-28 DIAGNOSIS — E03.9 HYPOTHYROIDISM, UNSPECIFIED TYPE: ICD-10-CM

## 2023-09-28 LAB — TSH SERPL DL<=0.05 MIU/L-ACNC: 2.07 UIU/ML (ref 0.45–4.5)

## 2023-09-28 PROCEDURE — 84443 ASSAY THYROID STIM HORMONE: CPT

## 2023-09-28 PROCEDURE — 36415 COLL VENOUS BLD VENIPUNCTURE: CPT

## 2023-09-28 PROCEDURE — 80048 BASIC METABOLIC PNL TOTAL CA: CPT

## 2023-09-28 PROCEDURE — 80076 HEPATIC FUNCTION PANEL: CPT

## 2023-09-28 PROCEDURE — 80061 LIPID PANEL: CPT

## 2023-09-29 LAB
ALBUMIN SERPL BCP-MCNC: 4.4 G/DL (ref 3.5–5)
ALP SERPL-CCNC: 87 U/L (ref 34–104)
ALT SERPL W P-5'-P-CCNC: 36 U/L (ref 7–52)
ANION GAP SERPL CALCULATED.3IONS-SCNC: 12 MMOL/L
AST SERPL W P-5'-P-CCNC: 25 U/L (ref 13–39)
BILIRUB DIRECT SERPL-MCNC: 0.03 MG/DL (ref 0–0.2)
BILIRUB SERPL-MCNC: 0.61 MG/DL (ref 0.2–1)
BUN SERPL-MCNC: 9 MG/DL (ref 5–25)
CALCIUM SERPL-MCNC: 9.6 MG/DL (ref 8.4–10.2)
CHLORIDE SERPL-SCNC: 106 MMOL/L (ref 96–108)
CHOLEST SERPL-MCNC: 153 MG/DL
CO2 SERPL-SCNC: 29 MMOL/L (ref 21–32)
CREAT SERPL-MCNC: 1.1 MG/DL (ref 0.6–1.3)
GFR SERPL CREATININE-BSD FRML MDRD: 75 ML/MIN/1.73SQ M
GLUCOSE P FAST SERPL-MCNC: 87 MG/DL (ref 65–99)
HDLC SERPL-MCNC: 38 MG/DL
LDLC SERPL CALC-MCNC: 89 MG/DL (ref 0–100)
NONHDLC SERPL-MCNC: 115 MG/DL
POTASSIUM SERPL-SCNC: 3.9 MMOL/L (ref 3.5–5.3)
PROT SERPL-MCNC: 8 G/DL (ref 6.4–8.4)
SODIUM SERPL-SCNC: 147 MMOL/L (ref 135–147)
TRIGL SERPL-MCNC: 131 MG/DL

## 2023-10-06 ENCOUNTER — OFFICE VISIT (OUTPATIENT)
Dept: FAMILY MEDICINE CLINIC | Facility: CLINIC | Age: 53
End: 2023-10-06

## 2023-10-06 VITALS
WEIGHT: 158 LBS | HEART RATE: 97 BPM | DIASTOLIC BLOOD PRESSURE: 82 MMHG | BODY MASS INDEX: 26.98 KG/M2 | SYSTOLIC BLOOD PRESSURE: 144 MMHG | OXYGEN SATURATION: 98 % | TEMPERATURE: 99.1 F | HEIGHT: 64 IN

## 2023-10-06 DIAGNOSIS — Z00.00 ANNUAL PHYSICAL EXAM: Primary | ICD-10-CM

## 2023-10-06 DIAGNOSIS — I10 ESSENTIAL HYPERTENSION: ICD-10-CM

## 2023-10-06 DIAGNOSIS — Z12.11 SCREENING FOR COLON CANCER: ICD-10-CM

## 2023-10-06 DIAGNOSIS — J45.20 MILD INTERMITTENT ASTHMA WITHOUT COMPLICATION: ICD-10-CM

## 2023-10-06 DIAGNOSIS — E03.9 HYPOTHYROIDISM, UNSPECIFIED TYPE: ICD-10-CM

## 2023-10-06 DIAGNOSIS — M19.072 OSTEOARTHRITIS OF BOTH ANKLES, UNSPECIFIED OSTEOARTHRITIS TYPE: ICD-10-CM

## 2023-10-06 DIAGNOSIS — E78.5 HYPERLIPIDEMIA, UNSPECIFIED HYPERLIPIDEMIA TYPE: ICD-10-CM

## 2023-10-06 DIAGNOSIS — M19.071 OSTEOARTHRITIS OF BOTH ANKLES, UNSPECIFIED OSTEOARTHRITIS TYPE: ICD-10-CM

## 2023-10-06 DIAGNOSIS — J30.1 ALLERGIC RHINITIS DUE TO POLLEN, UNSPECIFIED SEASONALITY: ICD-10-CM

## 2023-10-06 RX ORDER — ALBUTEROL SULFATE 90 UG/1
2 AEROSOL, METERED RESPIRATORY (INHALATION) EVERY 6 HOURS PRN
Qty: 6.7 G | Refills: 3 | Status: SHIPPED | OUTPATIENT
Start: 2023-10-06

## 2023-10-06 RX ORDER — ATORVASTATIN CALCIUM 40 MG/1
40 TABLET, FILM COATED ORAL DAILY
Qty: 30 TABLET | Refills: 0 | Status: SHIPPED | OUTPATIENT
Start: 2023-10-06

## 2023-10-06 RX ORDER — LEVOTHYROXINE SODIUM 0.03 MG/1
25 TABLET ORAL DAILY
Qty: 90 TABLET | Refills: 1 | Status: SHIPPED | OUTPATIENT
Start: 2023-10-06

## 2023-10-06 RX ORDER — FLUTICASONE PROPIONATE 50 MCG
1 SPRAY, SUSPENSION (ML) NASAL DAILY
Qty: 9.9 ML | Refills: 1 | Status: SHIPPED | OUTPATIENT
Start: 2023-10-06

## 2023-10-06 RX ORDER — ATORVASTATIN CALCIUM 20 MG/1
20 TABLET, FILM COATED ORAL DAILY
Qty: 90 TABLET | Refills: 1 | Status: CANCELLED | OUTPATIENT
Start: 2023-10-06

## 2023-10-06 RX ORDER — LISINOPRIL 20 MG/1
20 TABLET ORAL DAILY
Qty: 90 TABLET | Refills: 1 | Status: SHIPPED | OUTPATIENT
Start: 2023-10-06

## 2023-10-06 RX ORDER — FLUTICASONE PROPIONATE 110 UG/1
2 AEROSOL, METERED RESPIRATORY (INHALATION) 2 TIMES DAILY
Qty: 12 G | Refills: 1 | Status: SHIPPED | OUTPATIENT
Start: 2023-10-06

## 2023-10-06 RX ORDER — AMLODIPINE BESYLATE 10 MG/1
10 TABLET ORAL DAILY
Qty: 90 TABLET | Refills: 2 | Status: SHIPPED | OUTPATIENT
Start: 2023-10-06

## 2023-10-06 NOTE — RESULT ENCOUNTER NOTE
Lipid panel is normal and at goal, Electrolytes are normal, glucose normal, kidney function at baseline. Liver enzymes have returned back to baseline.  Thyroid normal and at goal. Will discuss patient at office visit 10/06

## 2023-10-06 NOTE — PROGRESS NOTES
[facility]    NAME: Delano Juarez  AGE: 45 y.o. SEX: male  : 1970     DATE: 10/6/2023     Assessment and Plan:     Problem List Items Addressed This Visit        Endocrine    Hypothyroidism  Assessment & Plan:   Labs obtained 23. TSH WNL     Plan:   Continue current treatment regiment. Orders:    Relevant Medications    levothyroxine 25 mcg tablet       Respiratory    Allergic rhinitis due to pollen  Assessment & Plan:   Continues to demonstrate symptoms consistent with seasonal allergies. Plan:   Continue current treatment regiment. Orders:    Relevant Medications    fluticasone (FLONASE) 50 mcg/act nasal spray    Mild intermittent asthma without complication  Assessment & Plan:   Continues to use albuterol & flovent as prescribed. Asthma remains well controlled. No recent asthma exacerbations. Plan:   Continue current treatment regiment. Orders:    Relevant Medications    albuterol (PROVENTIL HFA,VENTOLIN HFA) 90 mcg/act inhaler    fluticasone (Flovent HFA) 110 MCG/ACT inhaler       Cardiovascular and Mediastinum    Essential hypertension  Assessment & Plan:  Blood Pressure: 144/82, Goal<130/80-Blood pressure not at goal, didn't take blood pressure medications prior to coming to appointment  Continued improvement in Kidney function from previous blood work: BUN:9, Cr: 1.1, GFR: 75. Trend of GFR: 90>79>72>67>75>75. Last A1c 5.7 on 22. Patient on an ACE inhibitor for renal protection.     Plan:  • Continue Lisinopril 20mg daily and Amlodipine 10mg daily. • Continue ACE-inhibitor for renal protection  • Recommend less than 2,000mg of sodium per day. Relevant Medications    amLODIPine (NORVASC) 10 mg tablet    lisinopril (ZESTRIL) 20 mg tablet       Musculoskeletal and Integument    Osteoarthritis of both ankles  Assessment & Plan:  Intermittent complaints of ankle pain.  Pt notices improvement in pain with current treatment regiment. Plan:  Continue voltaren cream PRN    Relevant Medications    Diclofenac Sodium (VOLTAREN) 1 %       Other    Hyperlipidemia  Assessment & Plan:  Labs obtained 9/28/23  Decreased HDL from previous visit: 38 (previously 42>44)  LFTs now WNL compared to previous visit 3/27/23 AST 50, ALT 85. Total protein WNL compared to previous visit 3/27/23, 8.5. Albumin remains WNL. -The 10-year ASCVD risk score (Danna CAMPUZANO, et al., 2019) is: 13%    Values used to calculate the score:      Age: 45 years      Sex: Male      Is Non- : Yes      Diabetic: No      Tobacco smoker: No      Systolic Blood Pressure: 044 mmHg      Is BP treated: Yes      HDL Cholesterol: 38 mg/dL      Total Cholesterol: 153 mg/dL      Plan:  Increase to high statin therapy due to ASCVD risk score: atorvastatin 20mg>40mg for cholesterol control  Follow up 1 month for medication check    Relevant Medications    atorvastatin (LIPITOR) 40 mg tablet   Other Visit Diagnoses     Screening for colon cancer    -  Primary  Assessment & Plan:  Pt yet to complete Cologuard. Recommended pt to provide sample as soon as possible for colon ca screening.     Plan:  Follow up 1 month for Cologuard. Relevant Orders    Cologuard          Immunizations and preventive care screenings were discussed with patient today. Counseling:  Alcohol/drug use: discussed moderation in alcohol intake, the recommendations for healthy alcohol use, and avoidance of illicit drug use. Dental Health: discussed importance of regular tooth brushing, flossing, and dental visits. Injury prevention: discussed safety/seat belts, safety helmets, smoke detectors, carbon dioxide detectors, and smoking near bedding or upholstery. Sexual health: discussed sexually transmitted diseases, partner selection, use of condoms, avoidance of unintended pregnancy, and contraceptive alternatives.   · Exercise: the importance of regular exercise/physical activity was discussed. Recommend exercise 3-5 times per week for at least 30 minutes. The patient was informed that our practice does not provide medical marijuana cards at this time. We referred the patient to the [facility] website for further inquiry. Return in about 4 weeks (around 11/3/2023) for med change lipitor . Chief Complaint:     Chief Complaint   Patient presents with   • Physical Exam      History of Present Illness:     Adult Annual Physical   Patient here for a comprehensive physical exam. The patient reports problems - seasonal allergies. Johanny Coto is aware he has not completed his Cologuard sample, and states he will submit that as soon as he can. Johanny Coto is also inquiring about obtaining a medical marijuana card. Diet and Physical Activity  · Diet/Nutrition: well balanced diet, consuming 3-5 servings of fruits/vegetables daily, adequate whole grain intake and fish. Limiting red meats. Enjoys duck, chicken, goat. · Exercise: moderate cardiovascular exercise, 1-2 times a week on average and 1-2 hours on average. General Health  · Sleep: sleeps well, gets 4-6 hours of sleep on average and experiences daytime hypersomnolence. · Hearing: normal - bilateral.  · Vision: no vision problems and most recent eye exam >1 year ago. · Dental: no dental visits for >1 year, brushes teeth twice daily and flosses regularly.  Health  · Symptoms include: none     Review of Systems:     Review of Systems   Constitutional: Negative for chills and fever. HENT: Positive for rhinorrhea and sore throat. Negative for ear pain. Eyes: Negative for pain and visual disturbance. Respiratory: Positive for cough. Negative for shortness of breath. Cardiovascular: Negative for chest pain and palpitations. Gastrointestinal: Positive for diarrhea. Negative for abdominal pain and vomiting. Genitourinary: Negative for dysuria and hematuria.    Musculoskeletal: Positive for arthralgias and back pain. Skin: Negative for color change and rash. Neurological: Negative for seizures and syncope. All other systems reviewed and are negative. Past Medical History:     Past Medical History:   Diagnosis Date   • Disease of thyroid gland    • HTN (hypertension)    • Hypertension       Past Surgical History:     No past surgical history on file. Family History:     Family History   Problem Relation Age of Onset   • No Known Problems Mother    • No Known Problems Father    • Diabetes Maternal Grandmother       Social History:     Social History     Socioeconomic History   • Marital status: Single     Spouse name: None   • Number of children: None   • Years of education: 12   • Highest education level: 12th grade   Occupational History   • Occupation: none    Tobacco Use   • Smoking status: Never   • Smokeless tobacco: Never   Vaping Use   • Vaping Use: Never used   Substance and Sexual Activity   • Alcohol use: Yes     Comment:    Occasional   • Drug use: Not Currently   • Sexual activity: Not Currently     Partners: Female     Birth control/protection: Condom Male   Other Topics Concern   • None   Social History Narrative    · Most recent tobacco use screenin-      · Do you currently or have you served in the [address] [place] [address] [employer]:   No        · Diet:   Regular        · Caffeine intake:   Occasional      · Guns present in home:   No      · Seat belts used routinely:   Yes      · Sunscreen used routinely:   No      · Smoke alarm in home: Yes      · Advance directive:   No      · Live alone or with others:   with others      · Are there stairs in your home:    Yes      · International travel:   denies      · Pets:   No      · Deaf or serious difficulty hearing:   No      · Blind or serious difficulty seeing:   No      · Difficulty concentrating, remembering or making decisions:   No      · Difficulty walking or climbing stairs:   No      · Difficulty dressing or bathing:   No      · Difficulty doing errands alone:   No      Social Determinants of Health     Financial Resource Strain: Low Risk  (11/24/2020)    Overall Financial Resource Strain (CARDIA)    • Difficulty of Paying Living Expenses: Not hard at all   Food Insecurity: No Food Insecurity (11/24/2020)    Hunger Vital Sign    • Worried About Running Out of Food in the Last Year: Never true    • Ran Out of Food in the Last Year: Never true   Transportation Needs: No Transportation Needs (11/24/2020)    PRAPARE - Transportation    • Lack of Transportation (Medical): No    • Lack of Transportation (Non-Medical): No   Physical Activity: Insufficiently Active (5/27/2020)    Exercise Vital Sign    • Days of Exercise per Week: 3 days    • Minutes of Exercise per Session: 30 min   Stress: No Stress Concern Present (5/27/2020)    [facility]    • Feeling of Stress : Only a little   Social Connections:  Moderately Isolated (11/24/2020)    Social Connection and Isolation Panel [NHANES]    • Frequency of Communication with Friends and Family: Twice a week    • Frequency of Social Gatherings with Friends and Family: Once a week    • Attends Christianity Services: More than 4 times per year    • Active Member of Clubs or Organizations: No    • Attends Club or Organization Meetings: Never    • Marital Status: Never    Intimate Partner Violence: Not on file   Housing Stability: Not on file      Current Medications:     Current Outpatient Medications   Medication Sig Dispense Refill   • albuterol (PROVENTIL HFA,VENTOLIN HFA) 90 mcg/act inhaler Inhale 2 puffs every 6 (six) hours as needed for wheezing or shortness of breath 6.7 g 3   • amLODIPine (NORVASC) 10 mg tablet Take 1 tablet (10 mg total) by mouth daily 90 tablet 2   • atorvastatin (LIPITOR) 40 mg tablet Take 1 tablet (40 mg total) by mouth daily 30 tablet 0   • Diclofenac Sodium (VOLTAREN) 1 % Apply 2 g topically 4 (four) times a day For pain to affected area 100 g 0   • fluticasone (FLONASE) 50 mcg/act nasal spray 1 spray into each nostril daily 9.9 mL 1   • fluticasone (Flovent HFA) 110 MCG/ACT inhaler Inhale 2 puffs 2 (two) times a day Rinse mouth after use. 12 g 1   • levothyroxine 25 mcg tablet Take 1 tablet (25 mcg total) by mouth daily 90 tablet 1   • lisinopril (ZESTRIL) 20 mg tablet Take 1 tablet (20 mg total) by mouth daily 90 tablet 1   • Menthol-Methyl Salicylate (ZACH NESS GREASELESS) 10-15 % greaseless cream Apply topically daily at bedtime 30 g 0   • nystatin (MYCOSTATIN) cream Apply topically 2 (two) times a day 30 g 0     No current facility-administered medications for this visit. Allergies: Allergies   Allergen Reactions   • Nuts - Food Allergy Swelling     Lip swelling      Physical Exam:     /82   Pulse 97   Temp 99.1 °F (37.3 °C) (Tympanic)   Ht 5' 3.6" (1.615 m)   Wt 71.7 kg (158 lb)   SpO2 98%   BMI 27.46 kg/m²     Physical Exam  Vitals and nursing note reviewed. Constitutional:       General: He is not in acute distress. Appearance: Normal appearance. He is well-developed. HENT:      Head: Normocephalic and atraumatic. Right Ear: Tympanic membrane, ear canal and external ear normal.      Left Ear: Tympanic membrane, ear canal and external ear normal.      Nose: Nose normal. No congestion or rhinorrhea. Mouth/Throat:      Mouth: Mucous membranes are moist.      Pharynx: Oropharynx is clear. No oropharyngeal exudate or posterior oropharyngeal erythema. Eyes:      General:         Right eye: No discharge. Left eye: No discharge. Conjunctiva/sclera: Conjunctivae normal.      Pupils: Pupils are equal, round, and reactive to light. Neck:      Thyroid: No thyromegaly or thyroid tenderness. Vascular: No carotid bruit. Cardiovascular:      Rate and Rhythm: Normal rate and regular rhythm. Pulses: Normal pulses.            Radial pulses are 2+ on the right side and 2+ on the left side. Heart sounds: Normal heart sounds. No murmur heard. Pulmonary:      Effort: Pulmonary effort is normal. No respiratory distress. Breath sounds: No wheezing. Abdominal:      General: Bowel sounds are normal.      Palpations: Abdomen is soft. Tenderness: There is no abdominal tenderness. Musculoskeletal:         General: No swelling. Cervical back: Normal range of motion and neck supple. Right lower leg: No edema. Left lower leg: No edema. Lymphadenopathy:      Cervical: No cervical adenopathy. Skin:     General: Skin is warm and dry. Capillary Refill: Capillary refill takes less than 2 seconds. Neurological:      Mental Status: He is alert.    Psychiatric:         Mood and Affect: Mood normal.      The 10-year ASCVD risk score (Danna DK, et al., 2019) is: 13%    Values used to calculate the score:      Age: 45 years      Sex: Male      Is Non- : Yes      Diabetic: No      Tobacco smoker: No      Systolic Blood Pressure: 375 mmHg      Is BP treated: Yes      HDL Cholesterol: 38 mg/dL      Total Cholesterol: 153 mg/dL      Dejon Gilbert DO  [facility]

## 2023-10-06 NOTE — PATIENT INSTRUCTIONS
If you would like to obtain a medical marijuana card, please check out www.[place]gov for more information. Our office does not offer prescriptions for medical marijuana cards; we recommend contacting a licensed provider. Wellness Visit for Adults   AMBULATORY CARE:   A wellness visit  is when you see your healthcare provider to get screened for health problems. Your healthcare provider will also give you advice on how to stay healthy. Write down your questions so you remember to ask them. Ask your healthcare provider how often you should have a wellness visit. What happens at a wellness visit:  Your healthcare provider will ask about your health, and your family history of health problems. This includes high blood pressure, heart disease, and cancer. He or she will ask if you have symptoms that concern you, if you smoke, and about your mood. You may also be asked about your intake of medicines, supplements, food, and alcohol. Any of the following may be done:  • Your weight  will be checked. Your height may also be checked so your body mass index (BMI) can be calculated. Your BMI shows if you are at a healthy weight. • Your blood pressure  and heart rate will be checked. Your temperature may also be checked. • Blood and urine tests  may be done. Blood tests may be done to check your cholesterol levels. Abnormal cholesterol levels increase your risk for heart disease and stroke. You may also need a blood or urine test to check for diabetes if you are at increased risk. Urine tests may be done to look for signs of an infection or kidney disease. • A physical exam  includes checking your heartbeat and lungs with a stethoscope. Your healthcare provider may also check your skin to look for sun damage. • Screening tests  may be recommended. A screening test is done to check for diseases that may not cause symptoms.  The screening tests you may need depend on your age, gender, family history, and lifestyle habits. For example, colorectal screening may be recommended if you are 48years old or older. Screening tests you need if you are a woman:   • A Pap smear  is used to screen for cervical cancer. Pap smears are usually done every 3 to 5 years depending on your age. You may need them more often if you have had abnormal Pap smear test results in the past. Ask your healthcare provider how often you should have a Pap smear. • A mammogram  is an x-ray of your breasts to screen for breast cancer. Experts recommend mammograms every 2 years starting at age 48 years. You may need a mammogram at age 52 years or younger if you have an increased risk for breast cancer. Talk to your healthcare provider about when you should start having mammograms and how often you need them. Vaccines you may need:   • Get an influenza vaccine  every year. The influenza vaccine protects you from the flu. Several types of viruses cause the flu. The viruses change over time, so new vaccines are made each year. • Get a tetanus-diphtheria (Td) booster vaccine  every 10 years. This vaccine protects you against tetanus and diphtheria. Tetanus is a severe infection that may cause painful muscle spasms and lockjaw. Diphtheria is a severe bacterial infection that causes a thick covering in the back of your mouth and throat. • Get a human papillomavirus (HPV) vaccine  if you are female and aged 23 to 32 or male 23 to 24 and never received it. This vaccine protects you from HPV infection. HPV is the most common infection spread by sexual contact. HPV may also cause vaginal, penile, and anal cancers. • Get a pneumococcal vaccine  if you are aged 72 years or older. The pneumococcal vaccine is an injection given to protect you from pneumococcal disease. Pneumococcal disease is an infection caused by pneumococcal bacteria. The infection may cause pneumonia, meningitis, or an ear infection.     • Get a shingles vaccine  if you are 61 or older, even if you have had shingles before. The shingles vaccine is an injection to protect you from the varicella-zoster virus. This is the same virus that causes chickenpox. Shingles is a painful rash that develops in people who had chickenpox or have been exposed to the virus. How to eat healthy:  My Plate is a model for planning healthy meals. It shows the types and amounts of foods that should go on your plate. Fruits and vegetables make up about half of your plate, and grains and protein make up the other half. A serving of dairy is included on the side of your plate. The amount of calories and serving sizes you need depends on your age, gender, weight, and height. Examples of healthy foods are listed below:  • Eat a variety of vegetables  such as dark green, red, and orange vegetables. You can also include canned vegetables low in sodium (salt) and frozen vegetables without added butter or sauces. • Eat a variety of fresh fruits , canned fruit in 100% juice, frozen fruit, and dried fruit. • Include whole grains. At least half of the grains you eat should be whole grains. Examples include whole-wheat bread, wheat pasta, brown rice, and whole-grain cereals such as oatmeal.    • Eat a variety of protein foods such as seafood (fish and shellfish), lean meat, and poultry without skin (turkey and chicken). Examples of lean meats include pork leg, shoulder, or tenderloin, and beef round, sirloin, tenderloin, and extra lean ground beef. Other protein foods include eggs and egg substitutes, beans, peas, soy products, nuts, and seeds. • Choose low-fat dairy products such as skim or 1% milk or low-fat yogurt, cheese, and cottage cheese. • Limit unhealthy fats  such as butter, hard margarine, and shortening. Exercise:  Exercise at least 30 minutes per day on most days of the week. Some examples of exercise include walking, biking, dancing, and swimming.  You can also fit in more physical activity by taking the stairs instead of the elevator or parking farther away from stores. Include muscle strengthening activities 2 days each week. Regular exercise provides many health benefits. It helps you manage your weight, and decreases your risk for type 2 diabetes, heart disease, stroke, and high blood pressure. Exercise can also help improve your mood. Ask your healthcare provider about the best exercise plan for you. General health and safety guidelines:   • Do not smoke. Nicotine and other chemicals in cigarettes and cigars can cause lung damage. Ask your healthcare provider for information if you currently smoke and need help to quit. E-cigarettes or smokeless tobacco still contain nicotine. Talk to your healthcare provider before you use these products. • Limit alcohol. A drink of alcohol is 12 ounces of beer, 5 ounces of wine, or 1½ ounces of liquor. • Lose weight, if needed. Being overweight increases your risk of certain health conditions. These include heart disease, high blood pressure, type 2 diabetes, and certain types of cancer. • Protect your skin. Do not sunbathe or use tanning beds. Use sunscreen with a SPF 15 or higher. Apply sunscreen at least 15 minutes before you go outside. Reapply sunscreen every 2 hours. Wear protective clothing, hats, and sunglasses when you are outside. • Drive safely. Always wear your seatbelt. Make sure everyone in your car wears a seatbelt. A seatbelt can save your life if you are in an accident. Do not use your cell phone when you are driving. This could distract you and cause an accident. Pull over if you need to make a call or send a text message. • Practice safe sex. Use latex condoms if are sexually active and have more than one partner. Your healthcare provider may recommend screening tests for sexually transmitted infections (STIs). • Wear helmets, lifejackets, and protective gear.   Always wear a helmet when you ride a bike or motorcycle, go skiing, or play sports that could cause a head injury. Wear protective equipment when you play sports. Wear a lifejacket when you are on a boat or doing water sports. © Copyright [facility] 2023 Information is for End User's use only and may not be sold, redistributed or otherwise used for commercial purposes. The above information is an  only. It is not intended as medical advice for individual conditions or treatments. Talk to your doctor, nurse or pharmacist before following any medical regimen to see if it is safe and effective for you.

## 2023-11-15 ENCOUNTER — VBI (OUTPATIENT)
Dept: ADMINISTRATIVE | Facility: OTHER | Age: 53
End: 2023-11-15

## 2023-11-30 DIAGNOSIS — E78.5 HYPERLIPIDEMIA, UNSPECIFIED HYPERLIPIDEMIA TYPE: ICD-10-CM

## 2023-11-30 RX ORDER — ATORVASTATIN CALCIUM 40 MG/1
40 TABLET, FILM COATED ORAL DAILY
Qty: 30 TABLET | Refills: 1 | Status: SHIPPED | OUTPATIENT
Start: 2023-11-30 | End: 2023-12-04 | Stop reason: SDUPTHER

## 2023-12-04 ENCOUNTER — OFFICE VISIT (OUTPATIENT)
Dept: FAMILY MEDICINE CLINIC | Facility: CLINIC | Age: 53
End: 2023-12-04

## 2023-12-04 VITALS
TEMPERATURE: 98.3 F | HEIGHT: 64 IN | WEIGHT: 160 LBS | OXYGEN SATURATION: 97 % | HEART RATE: 84 BPM | SYSTOLIC BLOOD PRESSURE: 130 MMHG | DIASTOLIC BLOOD PRESSURE: 80 MMHG | BODY MASS INDEX: 27.31 KG/M2

## 2023-12-04 DIAGNOSIS — J06.9 VIRAL URI WITH COUGH: ICD-10-CM

## 2023-12-04 DIAGNOSIS — E78.5 HYPERLIPIDEMIA, UNSPECIFIED HYPERLIPIDEMIA TYPE: Primary | ICD-10-CM

## 2023-12-04 RX ORDER — L-DESOXYEPHEDRINE 50 MG
INHALER (EA) NASAL ONCE
Qty: 50 G | Refills: 0 | Status: SHIPPED | OUTPATIENT
Start: 2023-12-04 | End: 2023-12-04

## 2023-12-04 RX ORDER — ATORVASTATIN CALCIUM 40 MG/1
40 TABLET, FILM COATED ORAL DAILY
Qty: 90 TABLET | Refills: 0 | Status: SHIPPED | OUTPATIENT
Start: 2023-12-04

## 2023-12-04 RX ORDER — GUAIFENESIN/DEXTROMETHORPHAN 100-10MG/5
5 SYRUP ORAL 3 TIMES DAILY PRN
Qty: 354 ML | Refills: 0 | Status: SHIPPED | OUTPATIENT
Start: 2023-12-04

## 2023-12-04 NOTE — ASSESSMENT & PLAN NOTE
Symptoms for 8 day and overall improving. No fevers.   No wheezing, lungs clear not in exacerbation  Likely viral URI  Plan:  Start Robitussin and Troy's Vaporub for symptomatic control  Return precautions given  ED precautions given

## 2023-12-04 NOTE — PROGRESS NOTES
Assessment/Plan:    Hyperlipidemia  Labs obtained 9/28/23  Decreased HDL from previous visit: 38 (previously 42>44)  LFTs now WNL compared to previous visit 3/27/23 AST 50, ALT 85. Total protein WNL compared to previous visit 3/27/23, 8.5. Albumin remains WNL. -The 10-year ASCVD risk score (Danna CAMPUZANO, et al., 2019) is: 13.6%    Values used to calculate the score:      Age: 52 years      Sex: Male      Is Non- : Yes      Diabetic: No      Tobacco smoker: No      Systolic Blood Pressure: 668 mmHg      Is BP treated: Yes      HDL Cholesterol: 38 mg/dL      Total Cholesterol: 153 mg/dL        Plan:  Continue to high statin therapy due to ASCVD risk score: atorvastatin 40mg   Repeat Lipid panel in 3 months (lab not ordered)  Follow up 3 month for medication check    Viral URI with cough  Symptoms for 8 day and overall improving. No fevers. No wheezing, lungs clear not in exacerbation  Likely viral URI  Plan:  Start Robitussin and Troy's Vaporub for symptomatic control  Return precautions given  ED precautions given       Diagnoses and all orders for this visit:    Hyperlipidemia, unspecified hyperlipidemia type  -     atorvastatin (LIPITOR) 40 mg tablet; Take 1 tablet (40 mg total) by mouth daily    Viral URI with cough  -     dextromethorphan-guaiFENesin (ROBITUSSIN DM)  mg/5 mL syrup; Take 5 mL by mouth 3 (three) times a day as needed for cough  -     Camphor-Eucalyptus-Menthol (Vicks VapoRub) 4.7-1.2-2.6 % OINT; Apply topically 1 (one) time for 1 dose          Subjective:      Patient ID: Radha Severino is a 52 y.o. male. HPI  53 yo male with pmhx including allergies, mild astham, HLD, and HTN presents for follow up since increasing his Statin therapy. Patient reports taking his atorvastatin 40mg daily. Denies any muscle aches or other side effects. URI:  Patient reports starting with an URI on November 26th.    He reports Clear productive cough, hot flashes, runny nose, sneezing, sore throat. He has not used any medications so far. He was using lemon and lemon tea. Sick contacts include everyone on the bus and his brother. No home covid test. He has never had his Covid shots before. No flu shot this year. Denies any known fevers but does have chills. He has been using his albuterol inhaler about 3 times within this past week before bed. Still reports eating and drinking well. The following portions of the patient's history were reviewed and updated as appropriate: allergies, current medications, past family history, past medical history, past social history, past surgical history, and problem list.    Review of Systems   Constitutional:  Positive for chills. Negative for activity change, appetite change and fever. HENT:  Positive for congestion, rhinorrhea, sneezing and sore throat. Negative for ear pain. Respiratory:  Positive for cough. Negative for shortness of breath and wheezing. Cardiovascular:  Negative for chest pain. Objective:      /80 (BP Location: Right arm, Patient Position: Sitting, Cuff Size: Standard)   Pulse 84   Temp 98.3 °F (36.8 °C) (Tympanic)   Ht 5' 3.6" (1.615 m)   Wt 72.6 kg (160 lb)   SpO2 97%   BMI 27.81 kg/m²          Physical Exam  Vitals and nursing note reviewed. Constitutional:       General: He is not in acute distress. Appearance: Normal appearance. He is not ill-appearing, toxic-appearing or diaphoretic. HENT:      Head: Normocephalic and atraumatic. Right Ear: Tympanic membrane, ear canal and external ear normal. There is no impacted cerumen. Left Ear: Tympanic membrane, ear canal and external ear normal. There is no impacted cerumen. Nose: Nose normal. No congestion or rhinorrhea. Mouth/Throat:      Mouth: Mucous membranes are moist.      Pharynx: Oropharynx is clear. Posterior oropharyngeal erythema present. No oropharyngeal exudate. Eyes:      General: No scleral icterus.         Right eye: No discharge. Left eye: No discharge. Conjunctiva/sclera: Conjunctivae normal.   Cardiovascular:      Rate and Rhythm: Normal rate and regular rhythm. Heart sounds: No murmur heard. Pulmonary:      Effort: Pulmonary effort is normal. No respiratory distress. Breath sounds: Normal breath sounds. No stridor. No wheezing, rhonchi or rales. Chest:      Chest wall: No tenderness. Lymphadenopathy:      Cervical: No cervical adenopathy. Neurological:      Mental Status: He is alert.

## 2023-12-04 NOTE — ASSESSMENT & PLAN NOTE
Labs obtained 9/28/23  Decreased HDL from previous visit: 38 (previously 42>44)  LFTs now WNL compared to previous visit 3/27/23 AST 50, ALT 85. Total protein WNL compared to previous visit 3/27/23, 8.5. Albumin remains WNL.   -The 10-year ASCVD risk score (Danna CAMPUZANO, et al., 2019) is: 13.6%    Values used to calculate the score:      Age: 52 years      Sex: Male      Is Non- : Yes      Diabetic: No      Tobacco smoker: No      Systolic Blood Pressure: 090 mmHg      Is BP treated: Yes      HDL Cholesterol: 38 mg/dL      Total Cholesterol: 153 mg/dL        Plan:  Continue to high statin therapy due to ASCVD risk score: atorvastatin 40mg   Repeat Lipid panel in 3 months (lab not ordered)  Follow up 3 month for medication check

## 2024-02-02 PROBLEM — J06.9 VIRAL URI WITH COUGH: Status: RESOLVED | Noted: 2023-12-04 | Resolved: 2024-02-02

## 2024-03-07 ENCOUNTER — OFFICE VISIT (OUTPATIENT)
Dept: FAMILY MEDICINE CLINIC | Facility: CLINIC | Age: 54
End: 2024-03-07
Payer: COMMERCIAL

## 2024-03-07 VITALS
OXYGEN SATURATION: 98 % | BODY MASS INDEX: 28.96 KG/M2 | SYSTOLIC BLOOD PRESSURE: 142 MMHG | DIASTOLIC BLOOD PRESSURE: 92 MMHG | HEART RATE: 91 BPM | WEIGHT: 166.6 LBS | TEMPERATURE: 99.4 F

## 2024-03-07 DIAGNOSIS — J45.20 MILD INTERMITTENT ASTHMA WITHOUT COMPLICATION: ICD-10-CM

## 2024-03-07 DIAGNOSIS — R73.03 PREDIABETES: ICD-10-CM

## 2024-03-07 DIAGNOSIS — E03.9 HYPOTHYROIDISM, UNSPECIFIED TYPE: ICD-10-CM

## 2024-03-07 DIAGNOSIS — I10 ESSENTIAL HYPERTENSION: ICD-10-CM

## 2024-03-07 DIAGNOSIS — T78.40XA ALLERGY, INITIAL ENCOUNTER: ICD-10-CM

## 2024-03-07 DIAGNOSIS — J30.1 ALLERGIC RHINITIS DUE TO POLLEN, UNSPECIFIED SEASONALITY: ICD-10-CM

## 2024-03-07 DIAGNOSIS — E78.5 HYPERLIPIDEMIA, UNSPECIFIED HYPERLIPIDEMIA TYPE: Primary | ICD-10-CM

## 2024-03-07 PROCEDURE — 99213 OFFICE O/P EST LOW 20 MIN: CPT

## 2024-03-07 RX ORDER — CETIRIZINE HYDROCHLORIDE 10 MG/1
10 TABLET ORAL DAILY
Qty: 20 TABLET | Refills: 0 | Status: SHIPPED | OUTPATIENT
Start: 2024-03-07 | End: 2024-03-27

## 2024-03-07 NOTE — ASSESSMENT & PLAN NOTE
- Patient using 1-2 puffs of albuterol weekly; well controlled  - Continue with current management of albuterol as needed

## 2024-03-07 NOTE — ASSESSMENT & PLAN NOTE
- Patient on 40mg Lipitor  - Denied any muscle pain, or new symptoms since increase in dose  - Re-check lipid panel ordered

## 2024-03-07 NOTE — ASSESSMENT & PLAN NOTE
-Patient on Lisinopril 20mg and Norvasc 10mg, compliant with medications. Reports trying to eat more vegetables. Drinks 1 coffee per day. Does have lots of cangoods in  his diet. Educated on food label readings and eating less than 2g of salt per day   - Patient's BP was elevated at the beginning of the visit likely due to his long walk to get to the office. Rechecked and was back to baseline  Goal: <140/90  - Advised to monitor BP in the morning for next two weeks  -Follow up in 2 weeks for blood pressure recheck

## 2024-03-07 NOTE — PROGRESS NOTES
Name: Gabriel Neves      : 1970      MRN: [id number]  Encounter Provider: Ghazal Rock DO  Encounter Date: 3/7/2024   Encounter department: [facility]    Assessment & Plan     1. Hyperlipidemia, unspecified hyperlipidemia type  Assessment & Plan:  - Patient on 40mg Lipitor  - Denied any muscle pain, or new symptoms since increase in dose  - Re-check lipid panel ordered      Orders:  -     Lipid panel; Future  -     Comprehensive metabolic panel; Future  -     cetirizine (ZyrTEC Allergy) 10 mg tablet; Take 1 tablet (10 mg total) by mouth daily for 20 days    2. Essential hypertension  Assessment & Plan:  -Patient on Lisinopril 20mg and Norvasc 10mg, compliant with medications. Reports trying to eat more vegetables. Drinks 1 coffee per day. Does have lots of cangoods in  his diet. Educated on food label readings and eating less than 2g of salt per day   - Patient's BP was elevated at the beginning of the visit likely due to his long walk to get to the office. Rechecked and was back to baseline  Goal: <140/90  - Advised to monitor BP in the morning for next two weeks  -Follow up in 2 weeks for blood pressure recheck    Orders:  -     Lipid panel; Future  -     Comprehensive metabolic panel; Future  -     cetirizine (ZyrTEC Allergy) 10 mg tablet; Take 1 tablet (10 mg total) by mouth daily for 20 days    3. Allergy, initial encounter  Assessment & Plan:  Start daily Flonase and nightly zyrtec     Orders:  -     Lipid panel; Future  -     Comprehensive metabolic panel; Future  -     cetirizine (ZyrTEC Allergy) 10 mg tablet; Take 1 tablet (10 mg total) by mouth daily for 20 days    4. Hypothyroidism, unspecified type  Assessment & Plan:  - Patient remains asymptomatic on Levothyroxine  - Last TSH normal. Continue to monitor      5. Mild intermittent asthma without complication  Assessment & Plan:  - Patient using 1-2 puffs of albuterol weekly; well controlled  - Continue with  current management of albuterol as needed        6. Allergic rhinitis due to pollen, unspecified seasonality  Assessment & Plan:  - Patient has nightly allergies including sneezing, coughing, body aches   - Takes Flonase as needed  - Recommend Flonase spray daily and begin Zyretec nightly for allergy symptoms      7. Prediabetes       Nutrition Assessment and Intervention:     Nutrition patient handout reviewed with patient        Subjective      Concerned about BP today 170/90. He ate fried chicken and was walking to get here. Retook BP during visit found to be 140/80. Patients reporting asthma symptoms have significantly improved. Noted that when around people he begins wheezing and uses albuterol. Has only used albertol 1-2/week. Gets symptoms also when exercising but is generally does not experience wheezing or SOB on a daily basis. Last visit started on high-intensity Atorvostatin with ASCVD risk score of 13.5%. Denies any muscle pain or aches. Reports that allergies have been persistent with the weather especially when it rains at night time. He gets congestion, aches, sneezes. Takes Flonase 1-2/week, but no other allergy medications.     Cough  Associated symptoms include postnasal drip, rhinorrhea and wheezing. Pertinent negatives include no chest pain, ear pain, eye redness, headaches, myalgias, sore throat or shortness of breath.     Review of Systems   Constitutional: Negative.    HENT:  Positive for postnasal drip, rhinorrhea, sinus pressure and sneezing. Negative for congestion, ear discharge, ear pain, hearing loss, nosebleeds and sore throat.    Eyes:  Positive for itching. Negative for photophobia, pain, discharge, redness and visual disturbance.   Respiratory:  Positive for wheezing. Negative for cough, chest tightness and shortness of breath.    Cardiovascular:  Negative for chest pain and leg swelling.   Gastrointestinal:  Negative for abdominal pain, blood in stool, constipation, diarrhea and  vomiting.   Genitourinary:  Negative for difficulty urinating, dysuria and frequency.   Musculoskeletal:  Positive for arthralgias. Negative for gait problem, joint swelling, myalgias and neck pain.   Neurological:  Negative for dizziness, tremors, weakness, light-headedness, numbness and headaches.       Current Outpatient Medications on File Prior to Visit   Medication Sig    albuterol (PROVENTIL HFA,VENTOLIN HFA) 90 mcg/act inhaler Inhale 2 puffs every 6 (six) hours as needed for wheezing or shortness of breath    amLODIPine (NORVASC) 10 mg tablet Take 1 tablet (10 mg total) by mouth daily    atorvastatin (LIPITOR) 40 mg tablet Take 1 tablet (40 mg total) by mouth daily    dextromethorphan-guaiFENesin (ROBITUSSIN DM)  mg/5 mL syrup Take 5 mL by mouth 3 (three) times a day as needed for cough    Diclofenac Sodium (VOLTAREN) 1 % Apply 2 g topically 4 (four) times a day For pain to affected area    fluticasone (FLONASE) 50 mcg/act nasal spray 1 spray into each nostril daily    fluticasone (Flovent HFA) 110 MCG/ACT inhaler Inhale 2 puffs 2 (two) times a day Rinse mouth after use.    levothyroxine 25 mcg tablet Take 1 tablet (25 mcg total) by mouth daily    lisinopril (ZESTRIL) 20 mg tablet Take 1 tablet (20 mg total) by mouth daily    Menthol-Methyl Salicylate (ZACH NESS GREASELESS) 10-15 % greaseless cream Apply topically daily at bedtime    nystatin (MYCOSTATIN) cream Apply topically 2 (two) times a day       Objective     /92 (BP Location: Left arm, Patient Position: Sitting, Cuff Size: Standard)   Pulse 91   Temp 99.4 °F (37.4 °C) (Tympanic)   Wt 75.6 kg (166 lb 9.6 oz)   SpO2 98%   BMI 28.96 kg/m²     Physical Exam  Constitutional:       Appearance: Normal appearance. He is normal weight.   HENT:      Head: Normocephalic.      Right Ear: Tympanic membrane, ear canal and external ear normal. There is no impacted cerumen.      Left Ear: Tympanic membrane, ear canal and external ear normal. There is  no impacted cerumen.      Nose: Nose normal.   Eyes:      Extraocular Movements: Extraocular movements intact.      Pupils: Pupils are equal, round, and reactive to light.   Cardiovascular:      Rate and Rhythm: Normal rate and regular rhythm.      Pulses: Normal pulses.      Heart sounds: Normal heart sounds.   Pulmonary:      Effort: Pulmonary effort is normal.      Breath sounds: Normal breath sounds.   Abdominal:      General: Abdomen is flat. Bowel sounds are normal.      Palpations: Abdomen is soft.   Musculoskeletal:         General: Normal range of motion.      Cervical back: Normal range of motion and neck supple.   Skin:     General: Skin is warm and dry.      Capillary Refill: Capillary refill takes less than 2 seconds.   Neurological:      General: No focal deficit present.      Mental Status: He is alert.   Psychiatric:         Mood and Affect: Mood normal.         Behavior: Behavior normal.       Ghazal Rock DO

## 2024-03-07 NOTE — ASSESSMENT & PLAN NOTE
- Patient has nightly allergies including sneezing, coughing, body aches   - Takes Flonase as needed  - Recommend Flonase spray daily and begin Zyretec nightly for allergy symptoms

## 2024-03-07 NOTE — PROGRESS NOTES
Assessment/Plan:    No problem-specific Assessment & Plan notes found for this encounter.         There are no diagnoses linked to this encounter.        Subjective:      Patient ID: Gabriel Neves is a 53 y.o. male.    HPI  54 yo male with pmhx including allergies, mild astham, HLD, and HTN presents for follow up since increasing his Statin therapy.    Patient reports taking his atorvastatin 40mg daily. Denies any muscle aches or other side effects.     URI:  Patient reports starting with an URI on November 26th.   He reports Clear productive cough, hot flashes, runny nose, sneezing, sore throat. He has not used any medications so far. He was using lemon and lemon tea. Sick contacts include everyone on the bus and his brother. No home covid test. He has never had his Covid shots before. No flu shot this year. Denies any known fevers but does have chills. He has been using his albuterol inhaler about 3 times within this past week before bed. Still reports eating and drinking well.    The following portions of the patient's history were reviewed and updated as appropriate: allergies, current medications, past family history, past medical history, past social history, past surgical history, and problem list.    Review of Systems   Constitutional:  Positive for chills. Negative for activity change, appetite change and fever.   HENT:  Positive for congestion, rhinorrhea, sneezing and sore throat. Negative for ear pain.    Respiratory:  Positive for cough. Negative for shortness of breath and wheezing.    Cardiovascular:  Negative for chest pain.         Objective:      There were no vitals taken for this visit.         Physical Exam  Vitals and nursing note reviewed.   Constitutional:       General: He is not in acute distress.     Appearance: Normal appearance. He is not ill-appearing, toxic-appearing or diaphoretic.   HENT:      Head: Normocephalic and atraumatic.      Right Ear: Tympanic membrane, ear canal and  external ear normal. There is no impacted cerumen.      Left Ear: Tympanic membrane, ear canal and external ear normal. There is no impacted cerumen.      Nose: Nose normal. No congestion or rhinorrhea.      Mouth/Throat:      Mouth: Mucous membranes are moist.      Pharynx: Oropharynx is clear. Posterior oropharyngeal erythema present. No oropharyngeal exudate.   Eyes:      General: No scleral icterus.        Right eye: No discharge.         Left eye: No discharge.      Conjunctiva/sclera: Conjunctivae normal.   Cardiovascular:      Rate and Rhythm: Normal rate and regular rhythm.      Heart sounds: No murmur heard.  Pulmonary:      Effort: Pulmonary effort is normal. No respiratory distress.      Breath sounds: Normal breath sounds. No stridor. No wheezing, rhonchi or rales.   Chest:      Chest wall: No tenderness.   Lymphadenopathy:      Cervical: No cervical adenopathy.   Neurological:      Mental Status: He is alert.

## 2024-03-07 NOTE — PATIENT INSTRUCTIONS
Low-Sodium Diet   WHAT YOU NEED TO KNOW:   A low-sodium diet limits foods that are high in sodium (salt). You will need to follow a low-sodium diet if you have high blood pressure, kidney disease, or heart failure. You may also need to follow this diet if you have a condition that is causing your body to retain (hold) extra fluid. You may need to limit the amount of sodium you eat in a day to 1,500 to 2,000 mg. Ask your healthcare provider how much sodium you can have each day.  DISCHARGE INSTRUCTIONS:   How to use food labels to choose foods that are low in sodium:  Read food labels to find the amount of sodium they contain. The amount of sodium is listed in milligrams (mg). The % Daily Value (DV) column tells you how much of your daily needs are met by 1 serving of the food for each nutrient listed. Choose foods that have less than 5% of the DV of sodium. These foods are considered low in sodium. Foods that have 20% or more of the DV of sodium are considered high in sodium. Some food labels may also list any of the following terms that tell you about the sodium content in the food:  Sodium-free:  Less than 5 mg in each serving    Very low sodium:  35 mg of sodium or less in each serving    Low sodium:  140 mg of sodium or less in each serving    Reduced sodium:  At least 25% less sodium in each serving than the regular type    Light in sodium:  50% less sodium in each serving    Unsalted or no added salt:  No extra salt is added during processing (the food may still contain sodium)       Foods to avoid:  Salty foods are high in sodium. You should avoid the following:  Processed foods:      Mixes for cornbread, biscuits, cake, and pudding     Instant foods, such as potatoes, cereals, noodles, and rice     Packaged foods, such as bread stuffing, rice and pasta mixes, snack dip mixes, and macaroni and cheese     Canned foods, such as canned vegetables, soups, broths, sauces, and vegetable or tomato juice    Snack  foods, such as salted chips, popcorn, pretzels, pork rinds, salted crackers, and salted nuts    Frozen foods, such as dinners, entrees, vegetables with sauces, and breaded meats    Sauerkraut, pickled vegetables, and other foods prepared in brine    Meats and cheeses:      Smoked or cured meat, such as corned beef, mathew, ham, hot dogs, and sausage    Canned meats or spreads, such as potted meats, sardines, anchovies, and imitation seafood    Deli or lunch meats, such as bologna, ham, turkey, and roast beef    Processed cheese, such as American cheese and cheese spreads    Condiments, sauces, and seasonings:      Salt (¼ teaspoon of salt contains 575 mg of sodium)    Seasonings made with salt, such as garlic salt, celery salt, onion salt, and seasoned salt    Regular soy sauce, barbecue sauce, teriyaki sauce, steak sauce, Worcestershire sauce, and most flavored vinegars    Canned gravy and mixes     Regular condiments, such as mustard, ketchup, and salad dressings    Pickles and olives    Meat tenderizers and monosodium glutamate (MSG)    Foods to include:  Read the food label to find the amount of sodium in each serving.  Bread and cereal:  Try to choose breads with less than 80 mg of sodium per serving.     Bread, roll, german, tortilla, or unsalted crackers.    Ready-to-eat cereals with less than 5% DV of sodium (examples include shredded wheat and puffed rice)    Pasta    Vegetables and fruits:      Unsalted fresh, frozen, or canned vegetables    Fresh, frozen, or canned fruits    Fruit juice    Dairy:  One serving has about 150 mg of sodium.    Milk, all types    Yogurt    Hard cheese, such as cheddar, Swiss, [place] aditya, or mozzarella    Meat and other protein foods:  Some raw meats may have added sodium.     Plain meats, fish, and poultry     Egg    Other foods:      Homemade pudding    Unsalted nuts, popcorn, or pretzels    Unsalted butter or margarine    Ways to get less sodium:  If you are used to the  flavor of salt, it will take time to get used to low-sodium foods. Your healthcare provider or dietitian can help you create a plan for lowering sodium. The plan will be specific to your needs and your family's needs. You may focus on 1 or 2 changes each week, such as the following:  Add spices and herbs to foods instead of salt during cooking.  Use salt-free seasonings to add flavor to foods. Examples include onion powder, garlic powder, basil, durant powder, paprika, and parsley. Try lemon or lime juice or vinegar to give foods a tart flavor. Use hot peppers, pepper, or cayenne pepper to add a spicy flavor.    Do not keep a salt shaker at your kitchen table.  This may help keep you from adding salt to food at the table. A teaspoon of salt has 2,300 mg of sodium. It may take time to get used to enjoying the natural flavor of food instead of adding salt. Talk to your healthcare provider before you use salt substitutes. Some salt substitutes have a high amount of a chemical called potassium chloride. This form of potassium needs to be avoided if you have kidney disease.    Choose low-sodium foods at restaurants.  Meals from restaurants are often high in sodium. Some restaurants have nutrition information on the menu that tells you the amount of sodium in their foods. If possible, ask for your food to be prepared with less, or no salt.    Shop for unsalted or low-sodium foods and snacks at the grocery store.  Examples include unsalted or low-sodium broths, soups, and canned vegetables. Choose fresh or frozen vegetables instead. Choose unsalted nuts or seeds or fresh fruits or vegetables as snacks. Read food labels and choose salt-free, very low-sodium, or low-sodium foods. You can also rinse canned vegetables under running water to remove extra sodium before you cook them.    © Copyright [employer] 2023 Information is for End User's use only and may not be sold, redistributed or otherwise used for commercial  purposes.  The above information is an  only. It is not intended as medical advice for individual conditions or treatments. Talk to your doctor, nurse or pharmacist before following any medical regimen to see if it is safe and effective for you.

## 2024-03-10 PROBLEM — T78.40XA ALLERGIES: Status: ACTIVE | Noted: 2024-03-10

## 2024-03-15 ENCOUNTER — APPOINTMENT (OUTPATIENT)
Dept: LAB | Facility: CLINIC | Age: 54
End: 2024-03-15
Payer: COMMERCIAL

## 2024-03-15 DIAGNOSIS — E78.5 HYPERLIPIDEMIA, UNSPECIFIED HYPERLIPIDEMIA TYPE: ICD-10-CM

## 2024-03-15 DIAGNOSIS — T78.40XA ALLERGY, INITIAL ENCOUNTER: ICD-10-CM

## 2024-03-15 DIAGNOSIS — I10 ESSENTIAL HYPERTENSION: ICD-10-CM

## 2024-03-15 LAB
ALBUMIN SERPL BCP-MCNC: 4.6 G/DL (ref 3.5–5)
ALP SERPL-CCNC: 95 U/L (ref 34–104)
ALT SERPL W P-5'-P-CCNC: 33 U/L (ref 7–52)
ANION GAP SERPL CALCULATED.3IONS-SCNC: 9 MMOL/L (ref 4–13)
AST SERPL W P-5'-P-CCNC: 27 U/L (ref 13–39)
BILIRUB SERPL-MCNC: 0.92 MG/DL (ref 0.2–1)
BUN SERPL-MCNC: 16 MG/DL (ref 5–25)
CALCIUM SERPL-MCNC: 9.4 MG/DL (ref 8.4–10.2)
CHLORIDE SERPL-SCNC: 102 MMOL/L (ref 96–108)
CHOLEST SERPL-MCNC: 99 MG/DL
CO2 SERPL-SCNC: 31 MMOL/L (ref 21–32)
CREAT SERPL-MCNC: 1.16 MG/DL (ref 0.6–1.3)
GFR SERPL CREATININE-BSD FRML MDRD: 70 ML/MIN/1.73SQ M
GLUCOSE P FAST SERPL-MCNC: 86 MG/DL (ref 65–99)
HDLC SERPL-MCNC: 34 MG/DL
LDLC SERPL CALC-MCNC: 54 MG/DL (ref 0–100)
NONHDLC SERPL-MCNC: 65 MG/DL
POTASSIUM SERPL-SCNC: 3.8 MMOL/L (ref 3.5–5.3)
PROT SERPL-MCNC: 7.8 G/DL (ref 6.4–8.4)
SODIUM SERPL-SCNC: 142 MMOL/L (ref 135–147)
TRIGL SERPL-MCNC: 53 MG/DL

## 2024-03-15 PROCEDURE — 36415 COLL VENOUS BLD VENIPUNCTURE: CPT

## 2024-03-15 PROCEDURE — 80053 COMPREHEN METABOLIC PANEL: CPT

## 2024-03-15 PROCEDURE — 80061 LIPID PANEL: CPT

## 2024-03-22 ENCOUNTER — OFFICE VISIT (OUTPATIENT)
Dept: FAMILY MEDICINE CLINIC | Facility: CLINIC | Age: 54
End: 2024-03-22
Payer: COMMERCIAL

## 2024-03-22 VITALS
TEMPERATURE: 97.8 F | OXYGEN SATURATION: 98 % | BODY MASS INDEX: 27.49 KG/M2 | HEIGHT: 64 IN | SYSTOLIC BLOOD PRESSURE: 129 MMHG | RESPIRATION RATE: 18 BRPM | WEIGHT: 161 LBS | DIASTOLIC BLOOD PRESSURE: 83 MMHG | HEART RATE: 79 BPM

## 2024-03-22 DIAGNOSIS — E78.5 HYPERLIPIDEMIA, UNSPECIFIED HYPERLIPIDEMIA TYPE: ICD-10-CM

## 2024-03-22 DIAGNOSIS — I10 ESSENTIAL HYPERTENSION: Primary | ICD-10-CM

## 2024-03-22 PROBLEM — R79.89 ELEVATED LFTS: Status: RESOLVED | Noted: 2023-03-29 | Resolved: 2024-03-22

## 2024-03-22 PROCEDURE — 99213 OFFICE O/P EST LOW 20 MIN: CPT

## 2024-03-22 NOTE — ASSESSMENT & PLAN NOTE
Blood Pressure: 129/83, Goal<140/90  Kidney function at goal BUN:16, Cr: 1.16, GFR: 70.       Plan:  Continue Lisinopril 20mg daily and Amlodipine 10mg daily.   Recommend less than 2,000mg of sodium per day.   Follow-up in 3 months     Lifestyle Goal:   5 situps x 3 reps 2 days of the whole week.   Pushups 10 x3 reps 2 days of the whole week.   Body squats 5 x3 reps for 2 days at of whole week

## 2024-03-22 NOTE — PROGRESS NOTES
Name: Gabriel Neves      : 1970      MRN: [id number]  Encounter Provider: Ghazal Rock DO  Encounter Date: 3/22/2024   Encounter department: [facility]    Assessment & Plan     1. Essential hypertension  Assessment & Plan:  Blood Pressure: 129/83, Goal<140/90  Kidney function at goal BUN:16, Cr: 1.16, GFR: 70.       Plan:  Continue Lisinopril 20mg daily and Amlodipine 10mg daily.   Recommend less than 2,000mg of sodium per day.   Follow-up in 3 months     Lifestyle Goal:   5 situps x 3 reps 2 days of the whole week.   Pushups 10 x3 reps 2 days of the whole week.   Body squats 5 x3 reps for 2 days at of whole week      2. Hyperlipidemia, unspecified hyperlipidemia type  Assessment & Plan:  On high statin therapy due to increased ASCVD risk score and history of HLD  Reports taking Lipitor 40mg at night without any muscle ache side effects.   Lab Results   Component Value Date    CHOLESTEROL 99 03/15/2024    TRIG 53 03/15/2024    HDL 34 (L) 03/15/2024    LDLCALC 54 03/15/2024     Significantly improved Lipid panel  Liver function normal. No concerns for liver injury due to statin.        Plan:  Continue Lipitor 40mg daily   Discussed increasing healthy fats in diet with exercise to improve HDL.  Follow up 3 month                Physical Activity Assessment and Intervention:    Physical Activity Prescription completed with patient      Other interventions: Goal:   5 situps x 3 reps 2 days of the whole week.   Pushups 10 x3 reps 2 days of the whole week.   Body squats 5 x3 reps for 2 days at of whole week      Subjective   52 yo male with pmhx including allergies, mild asthma, HLD, and HTN presents for HTN follow up.    Reports home blood pressure -140s. Taking amlodipine 10mg and Lisinopril 20mg daily.  Denies headache, blurred vision, chest pain, SOB, leg swelling.  Denies caffiene. Cuts back on sodas. Does not drink energy. Avoids alcohols, maybe 1 beer socially.  Denies any NSAIDs. Avoids salt in diet. Has cut back on chinese foods due to salt. Patient has reviewed food labels, tries to limit it to <2g per day. Denies any lightheadedness/dizziness. Denies any myalgias.     Walks every days, walks 2-3 hours- in mall. Plays basketball once it gets nice outside.     Goal:   5 situps x 3 reps 2 days of the whole week.   Pushups 10 x3 reps 2 days of the whole week.   Body squats 5 x3 reps for 2 days at of whole week    Reports taking Lipitor 40mg at night without any muscle ache side effects. Discussed increasing healthy fats in diet with exercise to improve HDL.    Reviewed Blood work:   Lipid panel improved significantly.  TC: 99, T, LDL: 54, HDL still low at 34  Electrolytes normal, fasting glucose normal, Kidney function normal, Liver function normal. No concerns for liver injury due to statin.      Review of Systems   Constitutional:  Negative for chills, diaphoresis and fever.   Eyes:  Negative for visual disturbance.   Respiratory:  Negative for cough, shortness of breath and wheezing.    Cardiovascular:  Negative for chest pain, palpitations and leg swelling.   Gastrointestinal:  Negative for abdominal pain, nausea and vomiting.   Musculoskeletal:  Negative for myalgias.   Neurological:  Negative for dizziness, light-headedness and headaches.       Current Outpatient Medications on File Prior to Visit   Medication Sig    albuterol (PROVENTIL HFA,VENTOLIN HFA) 90 mcg/act inhaler Inhale 2 puffs every 6 (six) hours as needed for wheezing or shortness of breath    amLODIPine (NORVASC) 10 mg tablet Take 1 tablet (10 mg total) by mouth daily    atorvastatin (LIPITOR) 40 mg tablet Take 1 tablet (40 mg total) by mouth daily    cetirizine (ZyrTEC Allergy) 10 mg tablet Take 1 tablet (10 mg total) by mouth daily for 20 days    dextromethorphan-guaiFENesin (ROBITUSSIN DM)  mg/5 mL syrup Take 5 mL by mouth 3 (three) times a day as needed for cough    Diclofenac Sodium  "(VOLTAREN) 1 % Apply 2 g topically 4 (four) times a day For pain to affected area    fluticasone (FLONASE) 50 mcg/act nasal spray 1 spray into each nostril daily    fluticasone (Flovent HFA) 110 MCG/ACT inhaler Inhale 2 puffs 2 (two) times a day Rinse mouth after use.    levothyroxine 25 mcg tablet Take 1 tablet (25 mcg total) by mouth daily    lisinopril (ZESTRIL) 20 mg tablet Take 1 tablet (20 mg total) by mouth daily    Menthol-Methyl Salicylate (ZACH NESS GREASELESS) 10-15 % greaseless cream Apply topically daily at bedtime    nystatin (MYCOSTATIN) cream Apply topically 2 (two) times a day       Objective     /83 (BP Location: Right arm, Patient Position: Sitting, Cuff Size: Large)   Pulse 79   Temp 97.8 °F (36.6 °C) (Tympanic)   Resp 18   Ht 5' 3.6\" (1.615 m)   Wt 73 kg (161 lb)   SpO2 98%   BMI 27.98 kg/m²     Physical Exam  Constitutional:       Appearance: Normal appearance. He is normal weight.   HENT:      Head: Normocephalic.      Right Ear: Tympanic membrane, ear canal and external ear normal. There is no impacted cerumen.      Left Ear: Tympanic membrane, ear canal and external ear normal. There is no impacted cerumen.      Nose: Nose normal.   Eyes:      Extraocular Movements: Extraocular movements intact.      Pupils: Pupils are equal, round, and reactive to light.   Cardiovascular:      Rate and Rhythm: Normal rate and regular rhythm.      Pulses: Normal pulses.      Heart sounds: Normal heart sounds.   Pulmonary:      Effort: Pulmonary effort is normal.      Breath sounds: Normal breath sounds.   Abdominal:      General: Abdomen is flat. Bowel sounds are normal.      Palpations: Abdomen is soft.   Musculoskeletal:         General: Normal range of motion.      Cervical back: Normal range of motion and neck supple.   Skin:     General: Skin is warm and dry.      Capillary Refill: Capillary refill takes less than 2 seconds.   Neurological:      General: No focal deficit present.      Mental " Status: He is alert.   Psychiatric:         Mood and Affect: Mood normal.         Behavior: Behavior normal.       Lab Results   Component Value Date    SODIUM 142 03/15/2024    K 3.8 03/15/2024     03/15/2024    CO2 31 03/15/2024    BUN 16 03/15/2024    CREATININE 1.16 03/15/2024    GLUC 96 03/27/2023    CALCIUM 9.4 03/15/2024     Lab Results   Component Value Date    CHOLESTEROL 99 03/15/2024    CHOLESTEROL 153 09/28/2023    CHOLESTEROL 135 08/16/2022     Lab Results   Component Value Date    HDL 34 (L) 03/15/2024    HDL 38 (L) 09/28/2023    HDL 44 08/16/2022     Lab Results   Component Value Date    TRIG 53 03/15/2024    TRIG 131 09/28/2023    TRIG 66 08/16/2022     Lab Results   Component Value Date    NONHDLC 65 03/15/2024    NONHDLC 115 09/28/2023    NONHDLC 91 08/16/2022           Ghazal Rock DO

## 2024-03-22 NOTE — PATIENT INSTRUCTIONS
5 situps x 3 reps 2 days of the whole weeks.   Pushups 10 x3 reps 2 days of the whole week  Body squats 5 x3 reps for 2 days at of whole weeks.    Recommend 8 glasses of water per day ( total of 64 oz per day)    Cologuard

## 2024-03-22 NOTE — ASSESSMENT & PLAN NOTE
On high statin therapy due to increased ASCVD risk score and history of HLD  Reports taking Lipitor 40mg at night without any muscle ache side effects.   Lab Results   Component Value Date    CHOLESTEROL 99 03/15/2024    TRIG 53 03/15/2024    HDL 34 (L) 03/15/2024    LDLCALC 54 03/15/2024     Significantly improved Lipid panel  Liver function normal. No concerns for liver injury due to statin.        Plan:  Continue Lipitor 40mg daily   Discussed increasing healthy fats in diet with exercise to improve HDL.  Follow up 3 month

## 2024-06-14 ENCOUNTER — OFFICE VISIT (OUTPATIENT)
Dept: FAMILY MEDICINE CLINIC | Facility: CLINIC | Age: 54
End: 2024-06-14

## 2024-06-14 VITALS
OXYGEN SATURATION: 97 % | DIASTOLIC BLOOD PRESSURE: 96 MMHG | SYSTOLIC BLOOD PRESSURE: 130 MMHG | BODY MASS INDEX: 27.69 KG/M2 | HEART RATE: 88 BPM | WEIGHT: 162.2 LBS | TEMPERATURE: 97.1 F | HEIGHT: 64 IN

## 2024-06-14 DIAGNOSIS — M19.072 OSTEOARTHRITIS OF BOTH ANKLES, UNSPECIFIED OSTEOARTHRITIS TYPE: ICD-10-CM

## 2024-06-14 DIAGNOSIS — E78.5 HYPERLIPIDEMIA, UNSPECIFIED HYPERLIPIDEMIA TYPE: ICD-10-CM

## 2024-06-14 DIAGNOSIS — R06.09 DOE (DYSPNEA ON EXERTION): ICD-10-CM

## 2024-06-14 DIAGNOSIS — M19.071 OSTEOARTHRITIS OF BOTH ANKLES, UNSPECIFIED OSTEOARTHRITIS TYPE: ICD-10-CM

## 2024-06-14 DIAGNOSIS — B35.3 TINEA PEDIS OF LEFT FOOT: ICD-10-CM

## 2024-06-14 DIAGNOSIS — E03.9 HYPOTHYROIDISM, UNSPECIFIED TYPE: ICD-10-CM

## 2024-06-14 DIAGNOSIS — N18.2 CHRONIC KIDNEY DISEASE (CKD) STAGE G2/A2, MILDLY DECREASED GLOMERULAR FILTRATION RATE (GFR) BETWEEN 60-89 ML/MIN/1.73 SQUARE METER AND ALBUMINURIA CREATININE RATIO BETWEEN 30-299 MG/G: ICD-10-CM

## 2024-06-14 DIAGNOSIS — J45.20 MILD INTERMITTENT ASTHMA WITHOUT COMPLICATION: ICD-10-CM

## 2024-06-14 DIAGNOSIS — R01.1 MURMUR: ICD-10-CM

## 2024-06-14 DIAGNOSIS — J30.1 ALLERGIC RHINITIS DUE TO POLLEN, UNSPECIFIED SEASONALITY: ICD-10-CM

## 2024-06-14 DIAGNOSIS — R73.03 PREDIABETES: ICD-10-CM

## 2024-06-14 DIAGNOSIS — I10 ESSENTIAL HYPERTENSION: Primary | ICD-10-CM

## 2024-06-14 RX ORDER — CETIRIZINE HYDROCHLORIDE 10 MG/1
10 TABLET ORAL DAILY
Qty: 20 TABLET | Refills: 0 | Status: SHIPPED | OUTPATIENT
Start: 2024-06-14 | End: 2024-07-04

## 2024-06-14 RX ORDER — AMLODIPINE BESYLATE 10 MG/1
10 TABLET ORAL DAILY
Qty: 90 TABLET | Refills: 2 | Status: SHIPPED | OUTPATIENT
Start: 2024-06-14

## 2024-06-14 RX ORDER — FLUTICASONE PROPIONATE 110 UG/1
2 AEROSOL, METERED RESPIRATORY (INHALATION) 2 TIMES DAILY
Qty: 12 G | Refills: 1 | Status: SHIPPED | OUTPATIENT
Start: 2024-06-14

## 2024-06-14 RX ORDER — ALBUTEROL SULFATE 90 UG/1
2 AEROSOL, METERED RESPIRATORY (INHALATION) EVERY 6 HOURS PRN
Qty: 6.7 G | Refills: 3 | Status: SHIPPED | OUTPATIENT
Start: 2024-06-14

## 2024-06-14 RX ORDER — FLUTICASONE PROPIONATE 50 MCG
1 SPRAY, SUSPENSION (ML) NASAL DAILY
Qty: 9.9 ML | Refills: 1 | Status: SHIPPED | OUTPATIENT
Start: 2024-06-14

## 2024-06-14 RX ORDER — NYSTATIN 100000 U/G
CREAM TOPICAL 2 TIMES DAILY
Qty: 30 G | Refills: 0 | Status: SHIPPED | OUTPATIENT
Start: 2024-06-14

## 2024-06-14 RX ORDER — LEVOTHYROXINE SODIUM 0.03 MG/1
25 TABLET ORAL DAILY
Qty: 90 TABLET | Refills: 1 | Status: SHIPPED | OUTPATIENT
Start: 2024-06-14

## 2024-06-14 RX ORDER — MENTHOL 1.4 %
ADHESIVE PATCH, MEDICATED TOPICAL
Qty: 30 G | Refills: 0 | Status: SHIPPED | OUTPATIENT
Start: 2024-06-14

## 2024-06-14 RX ORDER — LISINOPRIL 20 MG/1
20 TABLET ORAL DAILY
Qty: 90 TABLET | Refills: 1 | Status: SHIPPED | OUTPATIENT
Start: 2024-06-14

## 2024-06-14 RX ORDER — ATORVASTATIN CALCIUM 40 MG/1
40 TABLET, FILM COATED ORAL DAILY
Qty: 90 TABLET | Refills: 0 | Status: SHIPPED | OUTPATIENT
Start: 2024-06-14

## 2024-06-14 NOTE — PROGRESS NOTES
Name: Gabriel Neves      : 1970      MRN: [id number]  Encounter Provider: Ghazal Rock DO  Encounter Date: 2024   Encounter department: [facility]    Assessment & Plan     1. Essential hypertension  Assessment & Plan:  Blood Pressure: 130/96, Goal<140/90  Kidney function at goal BUN:16, Cr: 1.16, GFR: 70.       Plan:  Continue Lisinopril 20mg daily and Amlodipine 10mg daily-refills sent  Recommend less than 2,000mg of sodium per day.   F/u Echo for MAYER to evaluate for CHF  Follow-up in 3 months     Lifestyle Goal:   Power walking twice a week around 1 block in the playground  Substitute a junk snack for a healthy snack, specifically sardines or fruits or vegetables  Orders:  -     amLODIPine (NORVASC) 10 mg tablet; Take 1 tablet (10 mg total) by mouth daily  -     lisinopril (ZESTRIL) 20 mg tablet; Take 1 tablet (20 mg total) by mouth daily  2. Mild intermittent asthma without complication  Assessment & Plan:  Asthma well controlled. Only uses albuterol when he is around triggers, not using albuterol daily.     Plan:  -Continue current regimen: Flovent BID  -Continue to rinse mouth after inhaler use  -Refilled albuterol and Flovent.   -F/u Echo for MAYER, if unremarkable consider getting formal PFTs  Orders:  -     albuterol (PROVENTIL HFA,VENTOLIN HFA) 90 mcg/act inhaler; Inhale 2 puffs every 6 (six) hours as needed for wheezing or shortness of breath  -     cetirizine (ZyrTEC Allergy) 10 mg tablet; Take 1 tablet (10 mg total) by mouth daily for 20 days  -     fluticasone (Flovent HFA) 110 MCG/ACT inhaler; Inhale 2 puffs 2 (two) times a day Rinse mouth after use.  3. Hypothyroidism, unspecified type  Assessment & Plan:  Most recent TSH 2.066 on 23, which is at goal (between 1-2, not higher than 2.5). Stable on levothyroxine 25.   Lab Results   Component Value Date    [id number] 2.066 2023     Plan:  - Continue levothyroxine-refilled during  visit  -Follow-up visit in 3 months   Orders:  -     levothyroxine 25 mcg tablet; Take 1 tablet (25 mcg total) by mouth daily  4. Hyperlipidemia, unspecified hyperlipidemia type  Assessment & Plan:  On high statin therapy due to increased ASCVD risk score and history of HLD  Reports taking Lipitor 40mg at night without any muscle ache side effects.   Lab Results   Component Value Date    CHOLESTEROL 99 03/15/2024    TRIG 53 03/15/2024    HDL 34 (L) 03/15/2024    LDLCALC 54 03/15/2024     Significantly improved Lipid panel  Liver function normal. No concerns for liver injury due to statin.        Plan:  Continue Lipitor 40mg daily   Discussed increasing healthy fats in diet with exercise to improve HDL.  Follow up 3 month   Orders:  -     atorvastatin (LIPITOR) 40 mg tablet; Take 1 tablet (40 mg total) by mouth daily  5. Chronic kidney disease (CKD) stage G2/A2, mildly decreased glomerular filtration rate (GFR) between 60-89 mL/min/1.73 square meter and albuminuria creatinine ratio between  mg/g  Assessment & Plan:  Lab Results   Component Value Date    EGFR 70 03/15/2024    EGFR 75 09/28/2023    EGFR 75 03/27/2023    CREATININE 1.16 03/15/2024    CREATININE 1.10 09/28/2023    CREATININE 1.10 03/27/2023   Slight decline in patient's kidney function from prior labs,   GFR: 91>79>72>67. Current kidney function is at baseline, Cr: 1.16, GFR: 70. Protein/Cr: 0.36. Microalbumin was elevated at 498, suspicious for prediabetes. POC HA1c: 5.7. Patient on an ACE inhibitor for renal protection. Continue to trend Cr, BUN, and egfr. Consider other etiologies of worsening CKD if lab values continue to trend down, like MM. Blood pressure is controlled and prediabetes are controlled with diet.     Plan:  Continue ACE-inhibitor for renal protection  Repeat CMP and Urine Microalbumin/Creatine ratio in 3 months  Follow-up in 3 months for kidney function   6. Osteoarthritis of both ankles, unspecified osteoarthritis type  -      Diclofenac Sodium (VOLTAREN) 1 %; Apply 2 g topically 4 (four) times a day For pain to affected area  -     Menthol-Methyl Salicylate (ZACH NESS GREASELESS) 10-15 % greaseless cream; Apply topically daily at bedtime  7. Allergic rhinitis due to pollen, unspecified seasonality  Assessment & Plan:  Refilled Flonase  Refilled Zyrtec  Orders:  -     cetirizine (ZyrTEC Allergy) 10 mg tablet; Take 1 tablet (10 mg total) by mouth daily for 20 days  -     fluticasone (FLONASE) 50 mcg/act nasal spray; 1 spray into each nostril daily  8. Tinea pedis of left foot  -     nystatin (MYCOSTATIN) cream; Apply topically 2 (two) times a day  9. Murmur  -     Echo complete w/ contrast if indicated; Future; Expected date: 06/14/2024  10. MAYER (dyspnea on exertion)  -     Echo complete w/ contrast if indicated; Future; Expected date: 06/14/2024  11. Prediabetes       Nutrition Assessment and Intervention:     New Nutrition Prescription completed with patient      Other interventions: Substitute a junk snack for a healthy snack, specifically sardines or fruits or vegetables    Physical Activity Assessment and Intervention:    Physical Activity Prescription completed with patient      Other interventions: Power walking twice a week around 1 block in the playground.       Subjective   52 yo male with pmhx including allergies, mild asthma, HLD, and HTN presents for HTN and Asthma follow up.    Feels short of breath and uses it when going up sets. Feels like improvement after using inhaler.     Reports not taking his home blood pressure readings because his home machine is out of batteries, before his SBP was ranging 130s-140s. Reports being compliant with amlodpine 10mg, Lisinopril 20mg.  Reports feeling SOB with exertion, which is relieved by albuterol inhaler. Reports being complaint with Flovent BID, rinses mouth after use. Feels like his breathing has improved on inhaler. Tries to stay away from tobacco smoke. Denies any chest pain,  "headache, blurred vision, SOB at rest, leg swelling. Reports sleeping with 3 pillows at baseline.     Goal/Exercise:  He met his goal of 5 situps x 3 reps 2 days of the whole week, Pushups 10 x3 reps 2 days of the whole week, Body squats 5 x3 reps for 2 days at of whole week. He does not want to increase reps but wants to make a new goal.   New goal power walking twice a week around 1 block in the playground.     Bike rides every week. Playing basketball, playing every week       Reports taking Lipitor 40mg at night without any muscle ache side effects.  Reports lots of snacking, including cakes, apple pie, junk food. He reports cutting back on sodas. Discussed switching unhealthy snack for healthy snack like Sardines/vegetables/Fruit    Reviewed Blood work:   Lipid panel improved significantly.  TC: 99, T, LDL: 54, HDL still low at 34  Electrolytes normal, fasting glucose normal, Kidney function normal, Liver function normal. No concerns for liver injury due to statin.      Review of Systems   Constitutional:  Negative for chills, diaphoresis and fever.   Eyes:  Negative for visual disturbance.   Respiratory:  Negative for cough, shortness of breath and wheezing.         Reports MAYER   Cardiovascular:  Negative for chest pain, palpitations and leg swelling.   Gastrointestinal:  Negative for abdominal pain, nausea and vomiting.   Musculoskeletal:  Negative for myalgias.   Neurological:  Negative for dizziness, light-headedness and headaches.       No current outpatient medications on file prior to visit.       Objective     /96 (BP Location: Left arm, Patient Position: Sitting, Cuff Size: Standard)   Pulse 88   Temp (!) 97.1 °F (36.2 °C) (Tympanic)   Ht 5' 3.6\" (1.615 m)   Wt 73.6 kg (162 lb 3.2 oz)   SpO2 97%   BMI 28.19 kg/m²     Physical Exam  Constitutional:       Appearance: Normal appearance. He is normal weight.   HENT:      Head: Normocephalic.   Cardiovascular:      Rate and Rhythm: Normal " rate and regular rhythm.      Pulses: Normal pulses.      Heart sounds: Murmur heard.   Pulmonary:      Effort: Pulmonary effort is normal. No respiratory distress.      Breath sounds: Normal breath sounds. No stridor. No wheezing, rhonchi or rales.   Abdominal:      General: Abdomen is flat. Bowel sounds are normal. There is no distension.      Palpations: Abdomen is soft.      Tenderness: There is no abdominal tenderness.   Musculoskeletal:         General: Normal range of motion.      Right lower leg: Edema (trace) present.      Left lower leg: Edema (trace) present.   Skin:     General: Skin is warm and dry.      Capillary Refill: Capillary refill takes less than 2 seconds.   Neurological:      General: No focal deficit present.      Mental Status: He is alert.   Psychiatric:         Mood and Affect: Mood normal.         Behavior: Behavior normal.       Lab Results   Component Value Date    SODIUM 142 03/15/2024    K 3.8 03/15/2024     03/15/2024    CO2 31 03/15/2024    BUN 16 03/15/2024    CREATININE 1.16 03/15/2024    GLUC 96 03/27/2023    CALCIUM 9.4 03/15/2024     Lab Results   Component Value Date    CHOLESTEROL 99 03/15/2024    CHOLESTEROL 153 09/28/2023    CHOLESTEROL 135 08/16/2022     Lab Results   Component Value Date    HDL 34 (L) 03/15/2024    HDL 38 (L) 09/28/2023    HDL 44 08/16/2022     Lab Results   Component Value Date    TRIG 53 03/15/2024    TRIG 131 09/28/2023    TRIG 66 08/16/2022     Lab Results   Component Value Date    NONHDLC 65 03/15/2024    NONHDLC 115 09/28/2023    NONHDLC 91 08/16/2022           Ghazal Rock DO

## 2024-06-17 NOTE — ASSESSMENT & PLAN NOTE
Asthma well controlled. Only uses albuterol when he is around triggers, not using albuterol daily.     Plan:  -Continue current regimen: Flovent BID  -Continue to rinse mouth after inhaler use  -Refilled albuterol and Flovent.   -F/u Echo for [facility]ER, if unremarkable consider getting formal PFTs  
Blood Pressure: 130/96, Goal<140/90  Kidney function at goal BUN:16, Cr: 1.16, GFR: 70.       Plan:  Continue Lisinopril 20mg daily and Amlodipine 10mg daily-refills sent  Recommend less than 2,000mg of sodium per day.   F/u Echo for [facility]ER to evaluate for CHF  Follow-up in 3 months     Lifestyle Goal:   Power walking twice a week around 1 block in the playground  Substitute a junk snack for a healthy snack, specifically sardines or fruits or vegetables  
Lab Results   Component Value Date    EGFR 70 03/15/2024    EGFR 75 09/28/2023    EGFR 75 03/27/2023    CREATININE 1.16 03/15/2024    CREATININE 1.10 09/28/2023    CREATININE 1.10 03/27/2023   Slight decline in patient's kidney function from prior labs,   GFR: 91>79>72>67. Current kidney function is at baseline, Cr: 1.16, GFR: 70. Protein/Cr: 0.36. Microalbumin was elevated at 498, suspicious for prediabetes. POC HA1c: 5.7. Patient on an ACE inhibitor for renal protection. Continue to trend Cr, BUN, and egfr. Consider other etiologies of worsening CKD if lab values continue to trend down, like MM. Blood pressure is controlled and prediabetes are controlled with diet.     Plan:  Continue ACE-inhibitor for renal protection  Repeat CMP and Urine Microalbumin/Creatine ratio in 3 months  Follow-up in 3 months for kidney function   
Most recent TSH 2.066 on 09/28/23, which is at goal (between 1-2, not higher than 2.5). Stable on levothyroxine 25.   Lab Results   Component Value Date    [id number] 2.066 09/28/2023     Plan:  - Continue levothyroxine-refilled during visit  -Follow-up visit in 3 months   
On high statin therapy due to increased ASCVD risk score and history of HLD  Reports taking Lipitor 40mg at night without any muscle ache side effects.   Lab Results   Component Value Date    CHOLESTEROL 99 03/15/2024    TRIG 53 03/15/2024    HDL 34 (L) 03/15/2024    LDLCALC 54 03/15/2024     Significantly improved Lipid panel  Liver function normal. No concerns for liver injury due to statin.        Plan:  Continue Lipitor 40mg daily   Discussed increasing healthy fats in diet with exercise to improve HDL.  Follow up 3 month   
Refilled Flonase  Refilled Zyrtec  
Slight decline in patient's kidney function from prior labs, GFR: 91>79>72. With elevated microalbumin at 498, indicating suspicion for prediabetes. POC HA1c: 5.7, confirming pre-diabetes. Patient on an ACE inhibitor for renal protection. Discussed lifestyle modifications and changes, including avoiding sugary drinks, increasing fruits and vegetables, decreasing carbohydrates. Patient was given a handout at checkout with foods to eat. Discussed continuing current exercise.     Plan:  Recommend healthy lifestyle changes  Follow-up in 3 months for kidney function and fasting BMP   
show

## 2024-07-10 ENCOUNTER — HOSPITAL ENCOUNTER (OUTPATIENT)
Dept: NON INVASIVE DIAGNOSTICS | Facility: HOSPITAL | Age: 54
Discharge: HOME | End: 2024-07-10

## 2024-07-15 ENCOUNTER — APPOINTMENT (EMERGENCY)
Dept: CT IMAGING | Facility: HOSPITAL | Age: 54
End: 2024-07-15
Payer: COMMERCIAL

## 2024-07-15 ENCOUNTER — HOSPITAL ENCOUNTER (EMERGENCY)
Facility: HOSPITAL | Age: 54
Discharge: HOME | End: 2024-07-15
Attending: INTERNAL MEDICINE
Payer: COMMERCIAL

## 2024-07-15 VITALS
SYSTOLIC BLOOD PRESSURE: 182 MMHG | TEMPERATURE: 97.9 F | OXYGEN SATURATION: 99 % | DIASTOLIC BLOOD PRESSURE: 99 MMHG | RESPIRATION RATE: 18 BRPM | HEART RATE: 103 BPM

## 2024-07-15 DIAGNOSIS — E87.6 HYPOKALEMIA: ICD-10-CM

## 2024-07-15 DIAGNOSIS — R20.2 TINGLING: Primary | ICD-10-CM

## 2024-07-15 LAB
ALBUMIN SERPL BCG-MCNC: 4.2 G/DL (ref 3.5–5)
ALP SERPL-CCNC: 74 U/L (ref 34–104)
ALT SERPL W P-5'-P-CCNC: 29 U/L (ref 7–52)
ANION GAP SERPL CALCULATED.3IONS-SCNC: 9 MMOL/L (ref 4–13)
AST SERPL W P-5'-P-CCNC: 19 U/L (ref 13–39)
BASOPHILS # BLD AUTO: 0.02 THOUSANDS/ÂΜL (ref 0–0.1)
BASOPHILS NFR BLD AUTO: 0 % (ref 0–1)
BILIRUB SERPL-MCNC: 0.55 MG/DL (ref 0.2–1)
BUN SERPL-MCNC: 11 MG/DL (ref 5–25)
CALCIUM SERPL-MCNC: 9.3 MG/DL (ref 8.4–10.2)
CHLORIDE SERPL-SCNC: 105 MMOL/L (ref 96–108)
CO2 SERPL-SCNC: 27 MMOL/L (ref 21–32)
CREAT SERPL-MCNC: 1.02 MG/DL (ref 0.6–1.3)
EOSINOPHIL # BLD AUTO: 0.37 THOUSAND/ÂΜL (ref 0–0.61)
EOSINOPHIL NFR BLD AUTO: 5 % (ref 0–6)
ERYTHROCYTE [DISTWIDTH] IN BLOOD BY AUTOMATED COUNT: 13.8 % (ref 11.6–15.1)
GFR SERPL CREATININE-BSD FRML MDRD: 82 ML/MIN/1.73SQ M
GLUCOSE SERPL-MCNC: 130 MG/DL (ref 65–140)
HCT VFR BLD AUTO: 42.2 % (ref 36.5–49.3)
HGB BLD-MCNC: 14.1 G/DL (ref 12–17)
IMM GRANULOCYTES # BLD AUTO: 0.02 THOUSAND/UL (ref 0–0.2)
IMM GRANULOCYTES NFR BLD AUTO: 0 % (ref 0–2)
LYMPHOCYTES # BLD AUTO: 2.11 THOUSANDS/ÂΜL (ref 0.6–4.47)
LYMPHOCYTES NFR BLD AUTO: 29 % (ref 14–44)
MCH RBC QN AUTO: 28.4 PG (ref 26.8–34.3)
MCHC RBC AUTO-ENTMCNC: 33.4 G/DL (ref 31.4–37.4)
MCV RBC AUTO: 85 FL (ref 82–98)
MONOCYTES # BLD AUTO: 0.8 THOUSAND/ÂΜL (ref 0.17–1.22)
MONOCYTES NFR BLD AUTO: 11 % (ref 4–12)
NEUTROPHILS # BLD AUTO: 3.98 THOUSANDS/ÂΜL (ref 1.85–7.62)
NEUTS SEG NFR BLD AUTO: 55 % (ref 43–75)
NRBC BLD AUTO-RTO: 0 /100 WBCS
PLATELET # BLD AUTO: 171 THOUSANDS/UL (ref 149–390)
PMV BLD AUTO: 12.8 FL (ref 8.9–12.7)
POTASSIUM SERPL-SCNC: 3.3 MMOL/L (ref 3.5–5.3)
PROT SERPL-MCNC: 7.7 G/DL (ref 6.4–8.4)
RBC # BLD AUTO: 4.96 MILLION/UL (ref 3.88–5.62)
SODIUM SERPL-SCNC: 141 MMOL/L (ref 135–147)
WBC # BLD AUTO: 7.3 THOUSAND/UL (ref 4.31–10.16)

## 2024-07-15 PROCEDURE — 99284 EMERGENCY DEPT VISIT MOD MDM: CPT | Performed by: INTERNAL MEDICINE

## 2024-07-15 PROCEDURE — 36415 COLL VENOUS BLD VENIPUNCTURE: CPT | Performed by: INTERNAL MEDICINE

## 2024-07-15 PROCEDURE — 70450 CT HEAD/BRAIN W/O DYE: CPT

## 2024-07-15 PROCEDURE — 80053 COMPREHEN METABOLIC PANEL: CPT | Performed by: INTERNAL MEDICINE

## 2024-07-15 PROCEDURE — 99284 EMERGENCY DEPT VISIT MOD MDM: CPT

## 2024-07-15 PROCEDURE — 85025 COMPLETE CBC W/AUTO DIFF WBC: CPT | Performed by: INTERNAL MEDICINE

## 2024-07-15 RX ORDER — POTASSIUM CHLORIDE 20 MEQ/1
40 TABLET, EXTENDED RELEASE ORAL ONCE
Status: COMPLETED | OUTPATIENT
Start: 2024-07-15 | End: 2024-07-15

## 2024-07-15 RX ADMIN — POTASSIUM CHLORIDE 40 MEQ: 1500 TABLET, EXTENDED RELEASE ORAL at 11:14

## 2024-07-15 NOTE — ED PROVIDER NOTES
History  Chief Complaint   Patient presents with    Numbness     Pt presents to the ED with tingling in bilateral hands since June, reports similar event in 2019 when electrolytes were low.     This is a 53 years old came complaining of tingling sensation of the left hand going into his left arm and then going into his chest for on the trunk and then going into his right arm.  Patient denies any slurred speech, weakness, numbness.  Patient denies any headache or dizziness.  Patient is stated that this had happened with him in 2019 when it was hot and he went to PMD who did CAT scans for him and did blood work and told him that he has some electrolyte abnormalities.  Patient received IV fluids and the then he said that the tingling goes away.  Patient stated that this happened when the weather is hot to like this he has this tingling sensation over the whole body.  Patient denies any history of CVA but he has history of hypertension and hypercholesterolemia.  Patient came to the ER walking and there is no distress.  Patient has no other complaints.  Patient denies any CP, SOB, abdominal pain, nausea vomiting diarrhea.  Patient denies urinary symptoms.  Patient requested to be checked for his electrolytes and he keeps saying that most likely has abnormality of his electrolytes causing this tingling sensation of the whole body.        Prior to Admission Medications   Prescriptions Last Dose Informant Patient Reported? Taking?   Diclofenac Sodium (VOLTAREN) 1 %   No No   Sig: Apply 2 g topically 4 (four) times a day For pain to affected area   Menthol-Methyl Salicylate (ZACH NESS GREASELESS) 10-15 % greaseless cream   No No   Sig: Apply topically daily at bedtime   albuterol (PROVENTIL HFA,VENTOLIN HFA) 90 mcg/act inhaler   No No   Sig: Inhale 2 puffs every 6 (six) hours as needed for wheezing or shortness of breath   amLODIPine (NORVASC) 10 mg tablet   No No   Sig: Take 1 tablet (10 mg total) by mouth daily    atorvastatin (LIPITOR) 40 mg tablet   No No   Sig: Take 1 tablet (40 mg total) by mouth daily   cetirizine (ZyrTEC Allergy) 10 mg tablet   No No   Sig: Take 1 tablet (10 mg total) by mouth daily for 20 days   fluticasone (FLONASE) 50 mcg/act nasal spray   No No   Si spray into each nostril daily   fluticasone (Flovent HFA) 110 MCG/ACT inhaler   No No   Sig: Inhale 2 puffs 2 (two) times a day Rinse mouth after use.   levothyroxine 25 mcg tablet   No No   Sig: Take 1 tablet (25 mcg total) by mouth daily   lisinopril (ZESTRIL) 20 mg tablet   No No   Sig: Take 1 tablet (20 mg total) by mouth daily   nystatin (MYCOSTATIN) cream   No No   Sig: Apply topically 2 (two) times a day      Facility-Administered Medications: None       Past Medical History:   Diagnosis Date    Disease of thyroid gland     HTN (hypertension)     Hypertension        History reviewed. No pertinent surgical history.    Family History   Problem Relation Age of Onset    No Known Problems Mother     No Known Problems Father     Diabetes Maternal Grandmother      I have reviewed and agree with the history as documented.    E-Cigarette/Vaping    E-Cigarette Use Never User      E-Cigarette/Vaping Substances    Nicotine No     THC No     CBD No     Flavoring No     Other No     Unknown No      Social History     Tobacco Use    Smoking status: Never    Smokeless tobacco: Never   Vaping Use    Vaping status: Never Used   Substance Use Topics    Alcohol use: Yes     Comment:    Occasional    Drug use: Not Currently       Review of Systems   Constitutional:  Negative for chills and fever.   HENT:  Negative for ear pain and sore throat.    Eyes:  Negative for pain and visual disturbance.   Respiratory:  Negative for cough and shortness of breath.    Cardiovascular:  Negative for chest pain and palpitations.   Gastrointestinal:  Negative for abdominal pain and vomiting.   Genitourinary:  Negative for dysuria and hematuria.   Musculoskeletal:  Negative for  arthralgias and back pain.   Skin:  Negative for color change and rash.   Neurological:  Negative for dizziness, tremors, seizures, syncope, facial asymmetry, weakness, light-headedness, numbness and headaches.   All other systems reviewed and are negative.      Physical Exam  Physical Exam  Vitals and nursing note reviewed.   Constitutional:       General: He is not in acute distress.     Appearance: Normal appearance. He is well-developed. He is not ill-appearing, toxic-appearing or diaphoretic.   HENT:      Head: Normocephalic and atraumatic.      Right Ear: Ear canal normal.      Left Ear: Ear canal normal.      Nose: Nose normal. No congestion or rhinorrhea.      Mouth/Throat:      Mouth: Mucous membranes are moist.      Pharynx: No oropharyngeal exudate or posterior oropharyngeal erythema.   Eyes:      Extraocular Movements: Extraocular movements intact.      Conjunctiva/sclera: Conjunctivae normal.      Pupils: Pupils are equal, round, and reactive to light.   Neck:      Vascular: No carotid bruit.   Cardiovascular:      Rate and Rhythm: Normal rate and regular rhythm.      Heart sounds: No murmur heard.     No friction rub.   Pulmonary:      Effort: Pulmonary effort is normal. No respiratory distress.      Breath sounds: Normal breath sounds. No wheezing, rhonchi or rales.   Chest:      Chest wall: No tenderness.   Abdominal:      General: Abdomen is flat. There is no distension.      Palpations: Abdomen is soft. There is no mass.      Tenderness: There is no abdominal tenderness. There is no right CVA tenderness, left CVA tenderness or guarding.   Musculoskeletal:         General: No swelling, tenderness, deformity or signs of injury.      Cervical back: Normal range of motion and neck supple. No rigidity or tenderness.      Right lower leg: No edema.      Left lower leg: No edema.   Lymphadenopathy:      Cervical: No cervical adenopathy.   Skin:     General: Skin is warm and dry.      Capillary Refill:  Capillary refill takes less than 2 seconds.      Coloration: Skin is not jaundiced or pale.      Findings: No bruising, erythema, lesion or rash.   Neurological:      General: No focal deficit present.      Mental Status: He is alert and oriented to person, place, and time.      Cranial Nerves: No cranial nerve deficit.      Sensory: No sensory deficit.      Motor: No weakness.      Coordination: Coordination normal.      Gait: Gait normal.      Deep Tendon Reflexes: Reflexes normal.   Psychiatric:         Mood and Affect: Mood normal.         Vital Signs  ED Triage Vitals [07/15/24 0851]   Temperature Pulse Respirations Blood Pressure SpO2   97.9 °F (36.6 °C) 103 18 (!) 182/99 99 %      Temp Source Heart Rate Source Patient Position - Orthostatic VS BP Location FiO2 (%)   Oral Monitor Sitting Right arm --      Pain Score       --           Vitals:    07/15/24 0851   BP: (!) 182/99   Pulse: 103   Patient Position - Orthostatic VS: Sitting         Visual Acuity      ED Medications  Medications   potassium chloride (Klor-Con M20) CR tablet 40 mEq (40 mEq Oral Given 7/15/24 1114)       Diagnostic Studies  Results Reviewed       Procedure Component Value Units Date/Time    Comprehensive metabolic panel [[id number]]  (Abnormal) Collected: 07/15/24 0928    Lab Status: Final result Specimen: Blood from Arm, Left Updated: 07/15/24 0951     Sodium 141 mmol/L      Potassium 3.3 mmol/L      Chloride 105 mmol/L      CO2 27 mmol/L      ANION GAP 9 mmol/L      BUN 11 mg/dL      Creatinine 1.02 mg/dL      Glucose 130 mg/dL      Calcium 9.3 mg/dL      AST 19 U/L      ALT 29 U/L      Alkaline Phosphatase 74 U/L      Total Protein 7.7 g/dL      Albumin 4.2 g/dL      Total Bilirubin 0.55 mg/dL      eGFR 82 ml/min/1.73sq m     Narrative:      National Kidney Disease Foundation guidelines for Chronic Kidney Disease (CKD):     Stage 1 with normal or high GFR (GFR > 90 mL/min/1.73 square meters)    Stage 2 Mild CKD (GFR = 60-89 mL/min/1.73  square meters)    Stage 3A Moderate CKD (GFR = 45-59 mL/min/1.73 square meters)    Stage 3B Moderate CKD (GFR = 30-44 mL/min/1.73 square meters)    Stage 4 Severe CKD (GFR = 15-29 mL/min/1.73 square meters)    Stage 5 End Stage CKD (GFR <15 mL/min/1.73 square meters)  Note: GFR calculation is accurate only with a steady state creatinine    CBC and differential [[id number]]  (Abnormal) Collected: 07/15/24 0928    Lab Status: Final result Specimen: Blood from Arm, Left Updated: 07/15/24 0934     WBC 7.30 Thousand/uL      RBC 4.96 Million/uL      Hemoglobin 14.1 g/dL      Hematocrit 42.2 %      MCV 85 fL      MCH 28.4 pg      MCHC 33.4 g/dL      RDW 13.8 %      MPV 12.8 fL      Platelets 171 Thousands/uL      nRBC 0 /100 WBCs      Segmented % 55 %      Immature Grans % 0 %      Lymphocytes % 29 %      Monocytes % 11 %      Eosinophils Relative 5 %      Basophils Relative 0 %      Absolute Neutrophils 3.98 Thousands/µL      Absolute Immature Grans 0.02 Thousand/uL      Absolute Lymphocytes 2.11 Thousands/µL      Absolute Monocytes 0.80 Thousand/µL      Eosinophils Absolute 0.37 Thousand/µL      Basophils Absolute 0.02 Thousands/µL                    CT head wo contrast   Final Result by Andi Cardozo MD (07/15 0957)      No acute intracranial abnormality.                  Resident: TIFFANIE MURRIETA I, the attending radiologist, have reviewed the images and agree with the final report above.      Workstation performed: [id number]                    Procedures  Procedures         ED Course                                 SBIRT 20yo+      Flowsheet Row Most Recent Value   Initial Alcohol Screen: US AUDIT-C     1. How often do you have a drink containing alcohol? 0 Filed at: 07/15/2024 0855   2. How many drinks containing alcohol do you have on a typical day you are drinking?  0 Filed at: 07/15/2024 0855   3a. Male UNDER 65: How often do you have five or more drinks on one occasion? 0 Filed at: 07/15/2024 0855   3b. FEMALE  Any Age, or MALE 65+: How often do you have 4 or more drinks on one occassion? 0 Filed at: 07/15/2024 0855   Audit-C Score 0 Filed at: 07/15/2024 0855   SALONI: How many times in the past year have you...    Used an illegal drug or used a prescription medication for non-medical reasons? Never Filed at: 07/15/2024 0855                      Medical Decision Making  This is a 53 years old came for having tingling sensation started on the left hand going into his arm then going into the whole body and patient described this as it happens when the weather is hot.  Patient has similar attack in 2019 when the weather was hot and he went to his PMD who did CAT scans and told him that he has some electrolyte abnormalities causing this sensation.  Patient denies history of CVA, weakness, dizziness, headache.  Patient denies other complaints.  Physical exam shows no positive findings.  Neurological exam is intact.  No focal deficits.  Reviewing the labs; CBC, CMP came back within normal limit except potassium is 3.3 and patient given potassium in the ER.  CT head shows no acute intracranial abnormalities.  Will discharge patient to home and follow-up with his PMD.  The case discussed extensively with the patient and I told him that tingling of the whole body is not real medical problem.  But patient instructed to avoid the hot weather.      Amount and/or Complexity of Data Reviewed  Labs: ordered.     Details: Reviewing the labs; CBC, CMP came back within normal limit except potassium is 3.3 and patient given potassium in the ER.        Radiology: ordered.     Details: CT head; no acute intracranial abnormalities.    Risk  Prescription drug management.                 Disposition  Final diagnoses:   Tingling   Hypokalemia     Time reflects when diagnosis was documented in both MDM as applicable and the Disposition within this note       Time User Action Codes Description Comment    7/15/2024 11:38 AM Jv Cleary Add  [R20.2] Tingling     7/15/2024 11:38 AM Jv Cleary [E87.6] Hypokalemia           ED Disposition       ED Disposition   Discharge    Condition   Stable    Date/Time   Mon Jul 15, 2024 1139    Comment   Gabriel Neves discharge to home/self care.                   Follow-up Information       Follow up With Specialties Details Why Contact Info Additional Information    [facility] In 1 week  [address]  [address]  [facility] [address] [phone] [facility], [address], [place], [address], [phone]    [facility]  Go to  As needed [address] [place] [id number]             Discharge Medication List as of 7/15/2024 11:39 AM        CONTINUE these medications which have NOT CHANGED    Details   albuterol (PROVENTIL HFA,VENTOLIN HFA) 90 mcg/act inhaler Inhale 2 puffs every 6 (six) hours as needed for wheezing or shortness of breath, Starting Fri 6/14/2024, Normal      amLODIPine (NORVASC) 10 mg tablet Take 1 tablet (10 mg total) by mouth daily, Starting Fri 6/14/2024, Normal      atorvastatin (LIPITOR) 40 mg tablet Take 1 tablet (40 mg total) by mouth daily, Starting Fri 6/14/2024, Normal      cetirizine (ZyrTEC Allergy) 10 mg tablet Take 1 tablet (10 mg total) by mouth daily for 20 days, Starting Fri 6/14/2024, Until u 7/4/2024, Normal      Diclofenac Sodium (VOLTAREN) 1 % Apply 2 g topically 4 (four) times a day For pain to affected area, Starting Fri 6/14/2024, Normal      fluticasone (FLONASE) 50 mcg/act nasal spray 1 spray into each nostril daily, Starting Fri 6/14/2024, Normal      fluticasone (Flovent HFA) 110 MCG/ACT inhaler Inhale 2 puffs 2 (two) times a day Rinse mouth after use., Starting Fri 6/14/2024, Normal      levothyroxine 25 mcg tablet Take 1 tablet (25 mcg total) by mouth daily, Starting Fri 6/14/2024, Normal      lisinopril (ZESTRIL)  20 mg tablet Take 1 tablet (20 mg total) by mouth daily, Starting Fri 6/14/2024, Normal      Menthol-Methyl Salicylate (ZACH NESS GREASELESS) 10-15 % greaseless cream Apply topically daily at bedtime, Starting Fri 6/14/2024, Normal      nystatin (MYCOSTATIN) cream Apply topically 2 (two) times a day, Starting Fri 6/14/2024, Normal             No discharge procedures on file.    PDMP Review       None            ED Provider  Electronically Signed by             Jv Cleary MD  07/15/24 4751

## 2024-07-15 NOTE — DISCHARGE INSTRUCTIONS
Follow-up with [facility].  Avoid staying in the hot weather.  Labs Reviewed   CBC AND DIFFERENTIAL - Abnormal       Result Value Ref Range Status    WBC 7.30  4.31 - 10.16 Thousand/uL Final    RBC 4.96  3.88 - 5.62 Million/uL Final    Hemoglobin 14.1  12.0 - 17.0 g/dL Final    Hematocrit 42.2  36.5 - 49.3 % Final    MCV 85  82 - 98 fL Final    MCH 28.4  26.8 - 34.3 pg Final    MCHC 33.4  31.4 - 37.4 g/dL Final    RDW 13.8  11.6 - 15.1 % Final    MPV 12.8 (*) 8.9 - 12.7 fL Final    Platelets 171  149 - 390 Thousands/uL Final    nRBC 0  /100 WBCs Final    Segmented % 55  43 - 75 % Final    Immature Grans % 0  0 - 2 % Final    Lymphocytes % 29  14 - 44 % Final    Monocytes % 11  4 - 12 % Final    Eosinophils Relative 5  0 - 6 % Final    Basophils Relative 0  0 - 1 % Final    Absolute Neutrophils 3.98  1.85 - 7.62 Thousands/µL Final    Absolute Immature Grans 0.02  0.00 - 0.20 Thousand/uL Final    Absolute Lymphocytes 2.11  0.60 - 4.47 Thousands/µL Final    Absolute Monocytes 0.80  0.17 - 1.22 Thousand/µL Final    Eosinophils Absolute 0.37  0.00 - 0.61 Thousand/µL Final    Basophils Absolute 0.02  0.00 - 0.10 Thousands/µL Final   COMPREHENSIVE METABOLIC PANEL - Abnormal    Sodium 141  135 - 147 mmol/L Final    Potassium 3.3 (*) 3.5 - 5.3 mmol/L Final    Chloride 105  96 - 108 mmol/L Final    CO2 27  21 - 32 mmol/L Final    ANION GAP 9  4 - 13 mmol/L Final    BUN 11  5 - 25 mg/dL Final    Creatinine 1.02  0.60 - 1.30 mg/dL Final    Comment: Standardized to IDMS reference method    Glucose 130  65 - 140 mg/dL Final    Comment: If the patient is fasting, the ADA then defines impaired fasting glucose as > 100 mg/dL and diabetes as > or equal to 123 mg/dL.    Calcium 9.3  8.4 - 10.2 mg/dL Final    AST 19  13 - 39 U/L Final    ALT 29  7 - 52 U/L Final    Comment: Specimen collection should occur prior to Sulfasalazine administration due to the potential for falsely depressed results.     Alkaline Phosphatase  74  34 - 104 U/L Final    Total Protein 7.7  6.4 - 8.4 g/dL Final    Albumin 4.2  3.5 - 5.0 g/dL Final    Total Bilirubin 0.55  0.20 - 1.00 mg/dL Final    Comment: Use of this assay is not recommended for patients undergoing treatment with eltrombopag due to the potential for falsely elevated results.  N-acetyl-p-benzoquinone imine (metabolite of Acetaminophen) will generate erroneously low results in samples for patients that have taken an overdose of Acetaminophen.    eGFR 82  ml/min/1.73sq m Final    Narrative:     National Kidney Disease Foundation guidelines for Chronic Kidney Disease (CKD):     Stage 1 with normal or high GFR (GFR > 90 mL/min/1.73 square meters)    Stage 2 Mild CKD (GFR = 60-89 mL/min/1.73 square meters)    Stage 3A Moderate CKD (GFR = 45-59 mL/min/1.73 square meters)    Stage 3B Moderate CKD (GFR = 30-44 mL/min/1.73 square meters)    Stage 4 Severe CKD (GFR = 15-29 mL/min/1.73 square meters)    Stage 5 End Stage CKD (GFR <15 mL/min/1.73 square meters)  Note: GFR calculation is accurate only with a steady state creatinine     CT head wo contrast   Final Result      No acute intracranial abnormality.                  Resident: TIFFANIE MURRIETA I, the attending radiologist, have reviewed the images and agree with the final report above.      Workstation performed: [id number]

## 2024-07-30 ENCOUNTER — TELEPHONE (OUTPATIENT)
Age: 54
End: 2024-07-30

## 2024-07-30 DIAGNOSIS — J45.20 MILD INTERMITTENT ASTHMA WITHOUT COMPLICATION: Primary | ICD-10-CM

## 2024-08-08 NOTE — TELEPHONE ENCOUNTER
PA for fluticasone (Flovent HFA) 110 MCG/ACT inhaler SUBMITTED     via    [x]CMM-KEY [id number]  []Surescripts-Case ID #   []Faxed to plan   []Other website   []Phone call Case ID #     Office notes sent, clinical questions answered. Awaiting determination    Turnaround time for your insurance to make a decision on your Prior Authorization can take 7-21 business days.

## 2024-08-08 NOTE — TELEPHONE ENCOUNTER
PA for Flovent 110 mcg Denied    Reason:(Screenshot if applicable)        Message sent to office clinical pool Yes    Denial letter scanned into Media Yes    Appeal started No ( Provider will need to decide if appeal is warranted and send clinical documentation to Prior Authorization Team for initiation.)    **Please follow up with your patient regarding denial and next steps**

## 2024-08-09 ENCOUNTER — HOSPITAL ENCOUNTER (OUTPATIENT)
Dept: NON INVASIVE DIAGNOSTICS | Facility: HOSPITAL | Age: 54
Discharge: HOME | End: 2024-08-09
Payer: COMMERCIAL

## 2024-08-09 VITALS
WEIGHT: 162 LBS | SYSTOLIC BLOOD PRESSURE: 182 MMHG | HEART RATE: 103 BPM | DIASTOLIC BLOOD PRESSURE: 99 MMHG | HEIGHT: 64 IN | BODY MASS INDEX: 27.66 KG/M2

## 2024-08-09 DIAGNOSIS — R06.09 DOE (DYSPNEA ON EXERTION): ICD-10-CM

## 2024-08-09 DIAGNOSIS — R01.1 MURMUR: ICD-10-CM

## 2024-08-09 PROCEDURE — 93306 TTE W/DOPPLER COMPLETE: CPT

## 2024-08-09 PROCEDURE — 93306 TTE W/DOPPLER COMPLETE: CPT | Performed by: INTERNAL MEDICINE

## 2024-08-09 RX ORDER — BUDESONIDE 90 UG/1
1 AEROSOL, POWDER RESPIRATORY (INHALATION) 2 TIMES DAILY
Qty: 3 EACH | Refills: 0 | Status: SHIPPED | OUTPATIENT
Start: 2024-08-09

## 2024-08-09 NOTE — TELEPHONE ENCOUNTER
Sent in Pulmicort Flexinhaler in place of Flovent HFA. Flovent inhaler was not covered by insurance.

## 2024-08-11 LAB
AORTIC ROOT: 3.2 CM
APICAL FOUR CHAMBER EJECTION FRACTION: 66 %
ASCENDING AORTA: 2.8 CM
BSA FOR ECHO PROCEDURE: 1.78 M2
E WAVE DECELERATION TIME: 169 MS
E/A RATIO: 0.91
FRACTIONAL SHORTENING: 46 (ref 28–44)
INTERVENTRICULAR SEPTUM IN DIASTOLE (PARASTERNAL SHORT AXIS VIEW): 1.1 CM
INTERVENTRICULAR SEPTUM: 1.1 CM (ref 0.6–1.1)
LAAS-AP2: 14.5 CM2
LAAS-AP4: 14.8 CM2
LEFT ATRIUM SIZE: 3.1 CM
LEFT ATRIUM VOLUME (MOD BIPLANE): 37 ML
LEFT ATRIUM VOLUME INDEX (MOD BIPLANE): 20.8 ML/M2
LEFT INTERNAL DIMENSION IN SYSTOLE: 2.2 CM (ref 2.1–4)
LEFT VENTRICULAR INTERNAL DIMENSION IN DIASTOLE: 4.1 CM (ref 3.5–6)
LEFT VENTRICULAR POSTERIOR WALL IN END DIASTOLE: 1.1 CM
LEFT VENTRICULAR STROKE VOLUME: 56 ML
LVSV (TEICH): 56 ML
MV E'TISSUE VEL-SEP: 9 CM/S
MV PEAK A VEL: 0.95 M/S
MV PEAK E VEL: 86 CM/S
MV STENOSIS PRESSURE HALF TIME: 49 MS
MV VALVE AREA P 1/2 METHOD: 4.49
RIGHT ATRIAL 2D VOLUME: 31 ML
RIGHT ATRIUM AREA SYSTOLE A4C: 12.4 CM2
RIGHT VENTRICLE ID DIMENSION: 3.4 CM
SL CV LEFT ATRIUM LENGTH A2C: 4.5 CM
SL CV LV EF: 60
SL CV PED ECHO LEFT VENTRICLE DIASTOLIC VOLUME (MOD BIPLANE) 2D: 73 ML
SL CV PED ECHO LEFT VENTRICLE SYSTOLIC VOLUME (MOD BIPLANE) 2D: 17 ML
TRICUSPID ANNULAR PLANE SYSTOLIC EXCURSION: 1.9 CM

## 2024-08-15 ENCOUNTER — VBI (OUTPATIENT)
Dept: ADMINISTRATIVE | Facility: OTHER | Age: 54
End: 2024-08-15

## 2024-08-15 NOTE — TELEPHONE ENCOUNTER
08/15/24 7:23 AM     Chart reviewed for CRC: Colonoscopy ; nothing is submitted to the patient's insurance at this time.     Yaidra Adams   [facility]

## 2024-08-22 ENCOUNTER — HOSPITAL ENCOUNTER (EMERGENCY)
Facility: HOSPITAL | Age: 54
Discharge: HOME | End: 2024-08-22
Attending: EMERGENCY MEDICINE
Payer: COMMERCIAL

## 2024-08-22 VITALS
OXYGEN SATURATION: 95 % | RESPIRATION RATE: 18 BRPM | HEART RATE: 95 BPM | TEMPERATURE: 98.1 F | WEIGHT: 153.3 LBS | SYSTOLIC BLOOD PRESSURE: 164 MMHG | DIASTOLIC BLOOD PRESSURE: 89 MMHG | BODY MASS INDEX: 26.65 KG/M2

## 2024-08-22 DIAGNOSIS — R20.2 PARESTHESIAS: Primary | ICD-10-CM

## 2024-08-22 LAB
ANION GAP SERPL CALCULATED.3IONS-SCNC: 8 MMOL/L (ref 4–13)
BASOPHILS # BLD AUTO: 0.03 THOUSANDS/ÂΜL (ref 0–0.1)
BASOPHILS NFR BLD AUTO: 0 % (ref 0–1)
BUN SERPL-MCNC: 19 MG/DL (ref 5–25)
CALCIUM SERPL-MCNC: 9.7 MG/DL (ref 8.4–10.2)
CHLORIDE SERPL-SCNC: 104 MMOL/L (ref 96–108)
CO2 SERPL-SCNC: 29 MMOL/L (ref 21–32)
CREAT SERPL-MCNC: 1.22 MG/DL (ref 0.6–1.3)
EOSINOPHIL # BLD AUTO: 0.2 THOUSAND/ÂΜL (ref 0–0.61)
EOSINOPHIL NFR BLD AUTO: 2 % (ref 0–6)
ERYTHROCYTE [DISTWIDTH] IN BLOOD BY AUTOMATED COUNT: 13.6 % (ref 11.6–15.1)
GFR SERPL CREATININE-BSD FRML MDRD: 66 ML/MIN/1.73SQ M
GLUCOSE SERPL-MCNC: 77 MG/DL (ref 65–140)
HCT VFR BLD AUTO: 44.1 % (ref 36.5–49.3)
HGB BLD-MCNC: 14.2 G/DL (ref 12–17)
IMM GRANULOCYTES # BLD AUTO: 0.01 THOUSAND/UL (ref 0–0.2)
IMM GRANULOCYTES NFR BLD AUTO: 0 % (ref 0–2)
LYMPHOCYTES # BLD AUTO: 1.72 THOUSANDS/ÂΜL (ref 0.6–4.47)
LYMPHOCYTES NFR BLD AUTO: 21 % (ref 14–44)
MAGNESIUM SERPL-MCNC: 2.4 MG/DL (ref 1.9–2.7)
MCH RBC QN AUTO: 27.9 PG (ref 26.8–34.3)
MCHC RBC AUTO-ENTMCNC: 32.2 G/DL (ref 31.4–37.4)
MCV RBC AUTO: 87 FL (ref 82–98)
MONOCYTES # BLD AUTO: 0.72 THOUSAND/ÂΜL (ref 0.17–1.22)
MONOCYTES NFR BLD AUTO: 9 % (ref 4–12)
NEUTROPHILS # BLD AUTO: 5.56 THOUSANDS/ÂΜL (ref 1.85–7.62)
NEUTS SEG NFR BLD AUTO: 68 % (ref 43–75)
NRBC BLD AUTO-RTO: 0 /100 WBCS
PLATELET # BLD AUTO: 183 THOUSANDS/UL (ref 149–390)
PMV BLD AUTO: 14.2 FL (ref 8.9–12.7)
POTASSIUM SERPL-SCNC: 4.5 MMOL/L (ref 3.5–5.3)
RBC # BLD AUTO: 5.09 MILLION/UL (ref 3.88–5.62)
SODIUM SERPL-SCNC: 141 MMOL/L (ref 135–147)
WBC # BLD AUTO: 8.24 THOUSAND/UL (ref 4.31–10.16)

## 2024-08-22 PROCEDURE — 99284 EMERGENCY DEPT VISIT MOD MDM: CPT | Performed by: EMERGENCY MEDICINE

## 2024-08-22 PROCEDURE — 80048 BASIC METABOLIC PNL TOTAL CA: CPT | Performed by: EMERGENCY MEDICINE

## 2024-08-22 PROCEDURE — 83735 ASSAY OF MAGNESIUM: CPT | Performed by: EMERGENCY MEDICINE

## 2024-08-22 PROCEDURE — 99283 EMERGENCY DEPT VISIT LOW MDM: CPT

## 2024-08-22 PROCEDURE — 96360 HYDRATION IV INFUSION INIT: CPT

## 2024-08-22 PROCEDURE — 36415 COLL VENOUS BLD VENIPUNCTURE: CPT | Performed by: EMERGENCY MEDICINE

## 2024-08-22 PROCEDURE — 85025 COMPLETE CBC W/AUTO DIFF WBC: CPT | Performed by: EMERGENCY MEDICINE

## 2024-08-22 RX ADMIN — SODIUM CHLORIDE 1000 ML: 0.9 INJECTION, SOLUTION INTRAVENOUS at 15:21

## 2024-08-22 NOTE — Clinical Note
Gabriel Neves was seen and treated in our emergency department on 8/22/2024.    No restrictions            Diagnosis: Paresthesias    Gabriel  may return to work on return date.    He may return on this date: 08/23/2024         If you have any questions or concerns, please don't hesitate to call.      Cyril Claros MD    ______________________________           _______________          _______________  Hospital Representative                              Date                                Time

## 2024-08-22 NOTE — ED PROVIDER NOTES
History  Chief Complaint   Patient presents with    Tingling     Left arm and side tingling, patient states his electrolytes are not balanced and this has happened in the past, denies any other symptoms     53-year-old male presents to the emergency department for evaluation of tingling.  Patient reports history of similar that he reports was related to his electrolytes being off.  Patient states that he is unsure which electrolyte was abnormal when he received this diagnosis.  He states that he has intermittent bilateral tingling sensations to his upper extremities.  States that he was seen here at this emergency department last month for similar concerns.  He states that the tingling sensations have not resolved since that visit.  He reports that he has not followed up with any other providers for further evaluation of the symptoms since that last ER visit.  He has not been taking any medications to treat the symptoms.  He denies fevers, chills, nausea, vomiting, diarrhea, chest pain, shortness of breath or localized numbness or weakness.  He denies any recent falls or direct trauma to the area.        Prior to Admission Medications   Prescriptions Last Dose Informant Patient Reported? Taking?   Diclofenac Sodium (VOLTAREN) 1 %   No No   Sig: Apply 2 g topically 4 (four) times a day For pain to affected area   Menthol-Methyl Salicylate (ZACH NESS GREASELESS) 10-15 % greaseless cream   No No   Sig: Apply topically daily at bedtime   albuterol (PROVENTIL HFA,VENTOLIN HFA) 90 mcg/act inhaler   No No   Sig: Inhale 2 puffs every 6 (six) hours as needed for wheezing or shortness of breath   amLODIPine (NORVASC) 10 mg tablet   No No   Sig: Take 1 tablet (10 mg total) by mouth daily   atorvastatin (LIPITOR) 40 mg tablet   No No   Sig: Take 1 tablet (40 mg total) by mouth daily   budesonide (Pulmicort Flexhaler) 90 MCG/ACT inhaler   No No   Sig: Inhale 1 puff 2 (two) times a day Rinse mouth after use.   cetirizine (ZyrTEC  Allergy) 10 mg tablet   No No   Sig: Take 1 tablet (10 mg total) by mouth daily for 20 days   fluticasone (FLONASE) 50 mcg/act nasal spray   No No   Si spray into each nostril daily   levothyroxine 25 mcg tablet   No No   Sig: Take 1 tablet (25 mcg total) by mouth daily   lisinopril (ZESTRIL) 20 mg tablet   No No   Sig: Take 1 tablet (20 mg total) by mouth daily   nystatin (MYCOSTATIN) cream   No No   Sig: Apply topically 2 (two) times a day      Facility-Administered Medications: None       Past Medical History:   Diagnosis Date    Disease of thyroid gland     HTN (hypertension)     Hypertension        No past surgical history on file.    Family History   Problem Relation Age of Onset    No Known Problems Mother     No Known Problems Father     Diabetes Maternal Grandmother      I have reviewed and agree with the history as documented.    E-Cigarette/Vaping    E-Cigarette Use Never User      E-Cigarette/Vaping Substances    Nicotine No     THC No     CBD No     Flavoring No     Other No     Unknown No      Social History     Tobacco Use    Smoking status: Never    Smokeless tobacco: Never   Vaping Use    Vaping status: Never Used   Substance Use Topics    Alcohol use: Yes     Comment:    Occasional    Drug use: Not Currently       Review of Systems   Constitutional:  Negative for chills and fever.   HENT:  Negative for ear pain and sore throat.    Eyes:  Negative for pain and visual disturbance.   Respiratory:  Negative for cough and shortness of breath.    Cardiovascular:  Negative for chest pain and palpitations.   Gastrointestinal:  Negative for abdominal pain and vomiting.   Genitourinary:  Negative for dysuria and hematuria.   Musculoskeletal:  Negative for arthralgias and back pain.   Skin:  Negative for color change and rash.   Neurological:  Negative for seizures and syncope.   All other systems reviewed and are negative.      Physical Exam  Physical Exam  Vitals and nursing note reviewed.    Constitutional:       General: He is not in acute distress.     Appearance: He is well-developed.   HENT:      Head: Normocephalic and atraumatic.   Eyes:      Extraocular Movements: Extraocular movements intact.      Conjunctiva/sclera: Conjunctivae normal.      Pupils: Pupils are equal, round, and reactive to light.   Cardiovascular:      Rate and Rhythm: Normal rate and regular rhythm.      Heart sounds: No murmur heard.  Pulmonary:      Effort: Pulmonary effort is normal. No respiratory distress.      Breath sounds: Normal breath sounds.   Abdominal:      Palpations: Abdomen is soft.      Tenderness: There is no abdominal tenderness.   Musculoskeletal:         General: No swelling.      Right upper arm: Normal.      Left upper arm: Normal.      Right forearm: Normal.      Left forearm: Normal.      Right hand: Normal.      Left hand: Normal.      Cervical back: Neck supple.      Comments: Distal upper extremity pulse, motor and sensation intact and symmetric bilaterally.  M/U/R/A nerve sensation intact.    strength 5/5.   Good capillary refill. 2+ radial pulses, bilaterally equal.   Finger abduction/adduction/opposition intact.   Supination/Pronation intact without reproduction of pain.   Able to 1+2 and 1+5 finger appose.   Able to 2+3 finger cross.   No scaphoid tenderness  No snuff box tenderness.   WE/WF/WRD/WUD 5/5, intact, without pain.   BICEPS/TRICEPS 5/5.   Shoulder abduction/adduction 5/5.      Skin:     General: Skin is warm and dry.      Capillary Refill: Capillary refill takes less than 2 seconds.   Neurological:      Mental Status: He is alert and oriented to person, place, and time.      GCS: GCS eye subscore is 4. GCS verbal subscore is 5. GCS motor subscore is 6.      Cranial Nerves: Cranial nerves 2-12 are intact.      Sensory: Sensation is intact.      Motor: Motor function is intact.      Coordination: Coordination is intact.      Gait: Gait is intact.   Psychiatric:         Mood and  Affect: Mood normal.         Vital Signs  ED Triage Vitals   Temperature Pulse Respirations Blood Pressure SpO2   08/22/24 1506 08/22/24 1420 08/22/24 1420 08/22/24 1420 08/22/24 1420   98.1 °F (36.7 °C) 95 18 164/89 95 %      Temp Source Heart Rate Source Patient Position - Orthostatic VS BP Location FiO2 (%)   08/22/24 1420 08/22/24 1420 08/22/24 1420 08/22/24 1420 --   Oral Monitor Sitting Right arm       Pain Score       08/22/24 1420       No Pain           Vitals:    08/22/24 1420   BP: 164/89   Pulse: 95   Patient Position - Orthostatic VS: Sitting         Visual Acuity      ED Medications  Medications   sodium chloride 0.9 % bolus 1,000 mL (0 mL Intravenous Stopped 8/22/24 1606)       Diagnostic Studies  Results Reviewed       Procedure Component Value Units Date/Time    Basic metabolic panel [[id number]] Collected: 08/22/24 1516    Lab Status: Final result Specimen: Blood from Arm, Left Updated: 08/22/24 1547     Sodium 141 mmol/L      Potassium 4.5 mmol/L      Chloride 104 mmol/L      CO2 29 mmol/L      ANION GAP 8 mmol/L      BUN 19 mg/dL      Creatinine 1.22 mg/dL      Glucose 77 mg/dL      Calcium 9.7 mg/dL      eGFR 66 ml/min/1.73sq m     Narrative:      National Kid[employer] Disease Foundation guidelines for Chronic Kidney Disease (CKD):     Stage 1 with normal or high GFR (GFR > 90 mL/min/1.73 square meters)    Stage 2 Mild CKD (GFR = 60-89 mL/min/1.73 square meters)    Stage 3A Moderate CKD (GFR = 45-59 mL/min/1.73 square meters)    Stage 3B Moderate CKD (GFR = 30-44 mL/min/1.73 square meters)    Stage 4 Severe CKD (GFR = 15-29 mL/min/1.73 square meters)    Stage 5 End Stage CKD (GFR <15 mL/min/1.73 square meters)  Note: GFR calculation is accurate only with a steady state creatinine    Magnesium [[id number]]  (Normal) Collected: 08/22/24 1516    Lab Status: Final result Specimen: Blood from Arm, Left Updated: 08/22/24 1547     Magnesium 2.4 mg/dL     CBC and differential [[id number]]  (Abnormal) Collected:  08/22/24 1516    Lab Status: Final result Specimen: Blood from Arm, Left Updated: 08/22/24 1522     WBC 8.24 Thousand/uL      RBC 5.09 Million/uL      Hemoglobin 14.2 g/dL      Hematocrit 44.1 %      MCV 87 fL      MCH 27.9 pg      MCHC 32.2 g/dL      RDW 13.6 %      MPV 14.2 fL      Platelets 183 Thousands/uL      nRBC 0 /100 WBCs      Segmented % 68 %      Immature Grans % 0 %      Lymphocytes % 21 %      Monocytes % 9 %      Eosinophils Relative 2 %      Basophils Relative 0 %      Absolute Neutrophils 5.56 Thousands/µL      Absolute Immature Grans 0.01 Thousand/uL      Absolute Lymphocytes 1.72 Thousands/µL      Absolute Monocytes 0.72 Thousand/µL      Eosinophils Absolute 0.20 Thousand/µL      Basophils Absolute 0.03 Thousands/µL                    No orders to display              Procedures  Procedures         ED Course               Medical Decision Making  A well-appearing 53-year-old male presented to the emergency department for evaluation of a tingling sensation.  On arrival the patient was awake, alert, oriented and in no acute distress.  Initial vital signs show that the patient was hypertensive but otherwise vital signs were within normal limits.  Physical exam was unremarkable including a benign and nonfocal neurological examination.  Workup done in the emergency department was reassuring.  All diagnostic studies were discussed with the patient in detail.  Recommendation was made for the patient to follow-up with neurology for continued symptoms.  An ambulatory referral to neurology was placed.  Return precautions were discussed.    Patient agrees with the plan for discharge and feels comfortable to go home with proper f/u. Advised to return for worsening or additional problems. Diagnostic tests were reviewed and questions answered. Diagnosis, care plan and treatment options were discussed. The patient understands instructions and will follow up as directed.        Amount and/or Complexity of Data  Reviewed  Labs: ordered.                 Disposition  Final diagnoses:   Paresthesias     Time reflects when diagnosis was documented in both MDM as applicable and the Disposition within this note       Time User Action Codes Description Comment    8/22/2024  3:58 PM Cyril Claros Add [R20.2] Paresthesias           ED Disposition       ED Disposition   Discharge    Condition   Stable    Date/Time   Thu Aug 22, 2024  3:58 PM    Comment   Gabriel Neves discharge to home/self care.                   Follow-up Information       Follow up With Specialties Details Why Contact Info Additional Information    Ghazal Rock DO  Schedule an appointment as soon as possible for a visit   General - Family Medicine [phone]     [facility] Neurology Schedule an appointment as soon as possible for a visit   [address] [facility]  [address]  [facility] [place] [id number] [phone] [facility], [address] [place] [address], [address], [place], [place], [id number] [phone]    [facility] Emergency Department Emergency Medicine Go to  If symptoms worsen [address] [address]  [phone] [facility] Emergency Department, [address] [address]            Discharge Medication List as of 8/22/2024  4:00 PM        CONTINUE these medications which have NOT CHANGED    Details   albuterol (PROVENTIL HFA,VENTOLIN HFA) 90 mcg/act inhaler Inhale 2 puffs every 6 (six) hours as needed for wheezing or shortness of breath, Starting Fri 6/14/2024, Normal      amLODIPine (NORVASC) 10 mg tablet Take 1 tablet (10 mg total) by mouth daily, Starting Fri 6/14/2024, Normal      atorvastatin (LIPITOR) 40 mg tablet Take 1 tablet (40 mg total) by mouth daily, Starting Fri 6/14/2024, Normal      budesonide (Pulmicort Flexhaler) 90 MCG/ACT inhaler Inhale 1 puff 2 (two) times a day Rinse mouth after use., Starting Fri  8/9/2024, Normal      cetirizine (ZyrTEC Allergy) 10 mg tablet Take 1 tablet (10 mg total) by mouth daily for 20 days, Starting Fri 6/14/2024, Until Thu 7/4/2024, Normal      Diclofenac Sodium (VOLTAREN) 1 % Apply 2 g topically 4 (four) times a day For pain to affected area, Starting Fri 6/14/2024, Normal      fluticasone (FLONASE) 50 mcg/act nasal spray 1 spray into each nostril daily, Starting Fri 6/14/2024, Normal      levothyroxine 25 mcg tablet Take 1 tablet (25 mcg total) by mouth daily, Starting Fri 6/14/2024, Normal      lisinopril (ZESTRIL) 20 mg tablet Take 1 tablet (20 mg total) by mouth daily, Starting Fri 6/14/2024, Normal      Menthol-Methyl Salicylate (ZACH NESS GREASELESS) 10-15 % greaseless cream Apply topically daily at bedtime, Starting Fri 6/14/2024, Normal      nystatin (MYCOSTATIN) cream Apply topically 2 (two) times a day, Starting Fri 6/14/2024, Normal                 PDMP Review       None            ED Provider  Electronically Signed by             Cyril Claros MD  08/22/24 6833

## 2024-09-17 ENCOUNTER — OFFICE VISIT (OUTPATIENT)
Dept: FAMILY MEDICINE CLINIC | Facility: CLINIC | Age: 54
End: 2024-09-17
Payer: COMMERCIAL

## 2024-09-17 VITALS
BODY MASS INDEX: 26.77 KG/M2 | OXYGEN SATURATION: 98 % | WEIGHT: 154 LBS | DIASTOLIC BLOOD PRESSURE: 77 MMHG | SYSTOLIC BLOOD PRESSURE: 137 MMHG | TEMPERATURE: 98.2 F | HEART RATE: 89 BPM

## 2024-09-17 DIAGNOSIS — E87.6 POTASSIUM (K) DEFICIENCY: ICD-10-CM

## 2024-09-17 DIAGNOSIS — I10 ESSENTIAL HYPERTENSION: Primary | ICD-10-CM

## 2024-09-17 DIAGNOSIS — E78.5 HYPERLIPIDEMIA, UNSPECIFIED HYPERLIPIDEMIA TYPE: ICD-10-CM

## 2024-09-17 DIAGNOSIS — R20.0 LEFT ARM NUMBNESS: ICD-10-CM

## 2024-09-17 DIAGNOSIS — J45.20 MILD INTERMITTENT ASTHMA WITHOUT COMPLICATION: ICD-10-CM

## 2024-09-17 DIAGNOSIS — E03.9 HYPOTHYROIDISM, UNSPECIFIED TYPE: ICD-10-CM

## 2024-09-17 DIAGNOSIS — J30.1 ALLERGIC RHINITIS DUE TO POLLEN, UNSPECIFIED SEASONALITY: ICD-10-CM

## 2024-09-17 PROCEDURE — 99214 OFFICE O/P EST MOD 30 MIN: CPT

## 2024-09-17 RX ORDER — NAPROXEN 500 MG/1
500 TABLET ORAL 2 TIMES DAILY WITH MEALS
Qty: 20 TABLET | Refills: 0 | Status: SHIPPED | OUTPATIENT
Start: 2024-09-17 | End: 2024-09-27

## 2024-09-17 NOTE — PROGRESS NOTES
Ambulatory Visit  Name: Gabriel Neves      : 1970      MRN: [id number]  Encounter Provider: Ghazal Rock DO  Encounter Date: 2024   Encounter department: [facility]    Assessment & Plan  Essential hypertension  Well-controlled. Continue current regimen  Orders:    Basic metabolic panel; Future    Mild intermittent asthma without complication  Well Controlled on Pulmicort and Albuterol prn       Allergic rhinitis due to pollen, unspecified seasonality         Potassium (K) deficiency  Resolved  Orders:    Basic metabolic panel; Future    Left arm numbness  Will get imaging and serology. Pain control with NSAIDs  Orders:    TSH, 3rd generation with Free T4 reflex; Future    Vitamin B12; Future    naproxen (Naprosyn) 500 mg tablet; Take 1 tablet (500 mg total) by mouth 2 (two) times a day with meals for 10 days    Basic metabolic panel; Future    Ambulatory Referral to Physical Therapy; Future    XR spine cervical complete 4 or 5 vw non injury; Future    XR spine thoracic 3 vw; Future    Hyperlipidemia, unspecified hyperlipidemia type  Continue Lipitor. F/u Lipid panel.  Orders:    Lipid panel; Future    Hypothyroidism, unspecified type  Continue Levothyroxine  Orders:    TSH, 3rd generation with Free T4 reflex; Future       History of Present Illness     HPI  52 yo male with pmhx including allergies, mild asthma, HLD, and HTN presents for HTN and Asthma follow up.     Reports tingling sensation left, mostly in the hands. Not taking medication for it. Went to ED in July and August for same problem, work-up significant for hypokalemia. No injuries. Constant. Denies dropping or weakness. No neck pain. Stopped playing basketball    HTN:  On amlodipine 10mg and Lisinopril. Does not use salt. Reports eating turkey but not lunch meats. Trying to eat more fruits and vegetables.    Asthma:  Uses albuterol only when needed. Not using unless wheeze. Hasn't needed to use albuterol  rescue. Allergies controlled. Avoiding tobacco smoke. Didn't caught any colds all summer.     Hypothyroidism- On levothyroxine. No       History obtained from : patient  Review of Systems   Constitutional:  Negative for chills, diaphoresis, fatigue, fever and unexpected weight change.   HENT:  Negative for congestion, ear pain, hearing loss, rhinorrhea and sore throat.    Eyes:  Negative for pain and visual disturbance.   Respiratory:  Negative for cough, shortness of breath and wheezing.    Cardiovascular:  Negative for chest pain, palpitations and leg swelling.   Gastrointestinal:  Negative for abdominal pain, blood in stool, constipation, diarrhea, nausea and vomiting.   Genitourinary:  Negative for dysuria and hematuria.   Musculoskeletal:  Negative for arthralgias and back pain.   Skin:  Negative for color change and rash.   Neurological:  Negative for dizziness, syncope, light-headedness and headaches.     Past Medical History   Past Medical History:   Diagnosis Date    Disease of thyroid gland     HTN (hypertension)     Hypertension      No past surgical history on file.  Family History   Problem Relation Age of Onset    No Known Problems Mother     No Known Problems Father     Diabetes Maternal Grandmother      Current Outpatient Medications on File Prior to Visit   Medication Sig Dispense Refill    albuterol (PROVENTIL HFA,VENTOLIN HFA) 90 mcg/act inhaler Inhale 2 puffs every 6 (six) hours as needed for wheezing or shortness of breath 6.7 g 3    amLODIPine (NORVASC) 10 mg tablet Take 1 tablet (10 mg total) by mouth daily 90 tablet 2    atorvastatin (LIPITOR) 40 mg tablet Take 1 tablet (40 mg total) by mouth daily 90 tablet 0    budesonide (Pulmicort Flexhaler) 90 MCG/ACT inhaler Inhale 1 puff 2 (two) times a day Rinse mouth after use. 3 each 0    cetirizine (ZyrTEC Allergy) 10 mg tablet Take 1 tablet (10 mg total) by mouth daily for 20 days 20 tablet 0    Diclofenac Sodium (VOLTAREN) 1 % Apply 2 g topically  4 (four) times a day For pain to affected area 100 g 0    fluticasone (FLONASE) 50 mcg/act nasal spray 1 spray into each nostril daily 9.9 mL 1    levothyroxine 25 mcg tablet Take 1 tablet (25 mcg total) by mouth daily 90 tablet 1    lisinopril (ZESTRIL) 20 mg tablet Take 1 tablet (20 mg total) by mouth daily 90 tablet 1    Menthol-Methyl Salicylate (ZACH NESS GREASELESS) 10-15 % greaseless cream Apply topically daily at bedtime 30 g 0    nystatin (MYCOSTATIN) cream Apply topically 2 (two) times a day 30 g 0     No current facility-administered medications on file prior to visit.     Allergies   Allergen Reactions    Nuts - Food Allergy Swelling     Lip swelling    Pollen Extract Sneezing      Current Outpatient Medications on File Prior to Visit   Medication Sig Dispense Refill    albuterol (PROVENTIL HFA,VENTOLIN HFA) 90 mcg/act inhaler Inhale 2 puffs every 6 (six) hours as needed for wheezing or shortness of breath 6.7 g 3    amLODIPine (NORVASC) 10 mg tablet Take 1 tablet (10 mg total) by mouth daily 90 tablet 2    atorvastatin (LIPITOR) 40 mg tablet Take 1 tablet (40 mg total) by mouth daily 90 tablet 0    budesonide (Pulmicort Flexhaler) 90 MCG/ACT inhaler Inhale 1 puff 2 (two) times a day Rinse mouth after use. 3 each 0    cetirizine (ZyrTEC Allergy) 10 mg tablet Take 1 tablet (10 mg total) by mouth daily for 20 days 20 tablet 0    Diclofenac Sodium (VOLTAREN) 1 % Apply 2 g topically 4 (four) times a day For pain to affected area 100 g 0    fluticasone (FLONASE) 50 mcg/act nasal spray 1 spray into each nostril daily 9.9 mL 1    levothyroxine 25 mcg tablet Take 1 tablet (25 mcg total) by mouth daily 90 tablet 1    lisinopril (ZESTRIL) 20 mg tablet Take 1 tablet (20 mg total) by mouth daily 90 tablet 1    Menthol-Methyl Salicylate (ZACH NESS GREASELESS) 10-15 % greaseless cream Apply topically daily at bedtime 30 g 0    nystatin (MYCOSTATIN) cream Apply topically 2 (two) times a day 30 g 0     No current  facility-administered medications on file prior to visit.      Social History     Tobacco Use    Smoking status: Never    Smokeless tobacco: Never   Vaping Use    Vaping status: Never Used   Substance and Sexual Activity    Alcohol use: Yes     Comment:    Occasional    Drug use: Not Currently    Sexual activity: Not Currently     Partners: Female     Birth control/protection: Condom Male         Objective     /77 (BP Location: Left arm, Patient Position: Sitting, Cuff Size: Standard)   Pulse 89   Temp 98.2 °F (36.8 °C) (Tympanic)   Wt 69.9 kg (154 lb)   SpO2 98%   BMI 26.77 kg/m²     Physical Exam  Vitals and nursing note reviewed.   Constitutional:       General: He is not in acute distress.     Appearance: He is well-developed.   HENT:      Head: Normocephalic and atraumatic.   Eyes:      Conjunctiva/sclera: Conjunctivae normal.   Cardiovascular:      Rate and Rhythm: Normal rate and regular rhythm.      Heart sounds: No murmur heard.  Pulmonary:      Effort: Pulmonary effort is normal. No respiratory distress.      Breath sounds: Normal breath sounds.   Abdominal:      Palpations: Abdomen is soft.      Tenderness: There is no abdominal tenderness.   Musculoskeletal:         General: No swelling.      Cervical back: Neck supple.      Comments: Negative Spurling.  Positive Tinel's and Positive Elbow Tinel's test  Strength in upper extremity intact and symmetrical  Negative spinal tenderness  Negative straight leg test     Skin:     General: Skin is warm and dry.      Capillary Refill: Capillary refill takes less than 2 seconds.   Neurological:      Mental Status: He is alert.      Comments: Reflexes intact and symmetrical   Psychiatric:         Mood and Affect: Mood normal.

## 2024-09-17 NOTE — PATIENT INSTRUCTIONS
Get  blood work done  Get Xrays done  Take Naproxen 500mg twice per day- in the morning and night for 10 days    Try Physical Therapy for nerve pain

## 2024-10-03 ENCOUNTER — EVALUATION (OUTPATIENT)
Dept: PHYSICAL THERAPY | Facility: REHABILITATION | Age: 54
End: 2024-10-03
Payer: COMMERCIAL

## 2024-10-03 DIAGNOSIS — R20.0 LEFT ARM NUMBNESS: ICD-10-CM

## 2024-10-03 PROCEDURE — 97530 THERAPEUTIC ACTIVITIES: CPT | Performed by: PHYSICAL THERAPIST

## 2024-10-03 PROCEDURE — 97162 PT EVAL MOD COMPLEX 30 MIN: CPT | Performed by: PHYSICAL THERAPIST

## 2024-10-03 NOTE — PROGRESS NOTES
PT Evaluation     Today's date: 10/3/2024  Patient name: Gabriel Neves  : 1970  MRN: [id number]  Referring provider: Rhianna Thornton MD  Dx:   Encounter Diagnosis     ICD-10-CM    1. Left arm numbness  R20.0 Ambulatory Referral to Physical Therapy    Imaging cervical and thoracic spine with plain films, serology sent (B12, TSH, and BMP), referral to PT, pain control with NSAIDs          Start Time: 1545  Stop Time: 1630  Total time in clinic (min): 45 minutes    Assessment    Assessment details: Patient is a 53 y.o. RHD male c/o 4 month history of LUE paresthesiae that radiates into his L flank. Patient reporting he believes it started with the hot weather and may be related to his history of low electrolytes as he has had this issue in the past on his R hip. On exam, patient presents with no signs of musculoskeletal cause of his paresthesiae. He presents with symmetrical strength, cervical mobility, and has negative nerve tension tests and negative Spurling's bilaterally. He does has impaired sensation given his report of paresthesiae but it does not appear to have an orthopedic cause. The paresthesiae did not change with any movement or stimulus during the evaluation today. Based on his examination, recommend patient continue medical work up as his symptoms do not appear to have an orthopedic etiology. Recommend considering kidney and/or electrolyte history based on his referral pattern of sx. Patient will be signed off PT at this time.       Subjective Evaluation    History of Present Illness  Mechanism of injury: Patient reports 4 month onset of having paresthesiae in his LUE and into his L flank. He reports in 2019 he had similar sx but in his R hip region. He was told it was his electrolytes and it resolved quickly. He was told it could be the same problem. Reports his flank can tighten.     Denies neck pain.     Aggravating factors: random, nothing specific  Ease factors: none  Patient  Goals  Patient goal: figure out why the tingling is happening  Pain  No pain reported    Social Support  Lives in: apartment  Lives with: parents    Employment status: not working (previously )  Hand dominance: right        Objective     Neurological Testing     Sensation   Cervical/Thoracic   Left   Diminished: light touch    Right   Intact: light touch    Comments   Left light touch: tingling C6-C7.     Reflexes   Left   Biceps (C5/C6): normal (2+)  Brachioradialis (C6): normal (2+)    Right   Biceps (C5/C6): normal (2+)  Brachioradialis (C6): normal (2+)    Active Range of Motion   Cervical/Thoracic Spine       Cervical    Flexion:  WFL  Extension:  WFL  Left lateral flexion:  WFL  Right lateral flexion:  WFL  Left rotation:  WFL  Right rotation:  WFL    Strength/Myotome Testing     Left Shoulder     Planes of Motion   Abduction: 5     Right Shoulder     Planes of Motion   Abduction: 5     Left Elbow   Flexion: 5  Extension: 5    Right Elbow   Flexion: 5  Extension: 5    Left Wrist/Hand   Wrist extension: 5  Wrist flexion: 5  Thumb extension: 4+    Right Wrist/Hand   Wrist extension: 5  Wrist flexion: 5  Thumb extension: 4    Additional Strength Details  HHD:  L - 55 lbs  R - 60 lbs    Tests   Cervical     Left   Negative Spurling's Test A.     Right   Negative Spurling's Test A.     Left Shoulder   Negative ULTT1, ULTT3 and ULTT4.     Right Shoulder   Negative ULTT1, ULTT3 and ULTT4.              POC expires Unit limit Auth Expiration date PT/OT/ST + Visit Limit?   N/A  12/31/24                              Visit/Unit Tracking  AUTH Status:  Date 10/3             Approved - 24 visits Used 1 Remaining 23               Diagnosis: Non-orthopedic paresthesiae   Precautions: HTN   Insurance: [employer]    10/3          Vitals 154/86    FOTO      Patient Ed           Blood work Encourage completing his blood work to determine if there is an issue          Electrolytes Can resolve with electrolyte  packs or pill form                                                                 Neuro Re-Ed                                                                                        Ther Ex           Aerobic conditioning                                                                                        Ther Activity                                 Manual                                                                  Modalities

## 2024-10-04 ENCOUNTER — APPOINTMENT (OUTPATIENT)
Dept: LAB | Facility: CLINIC | Age: 54
End: 2024-10-04
Payer: COMMERCIAL

## 2024-10-04 DIAGNOSIS — R20.0 LEFT ARM NUMBNESS: ICD-10-CM

## 2024-10-04 DIAGNOSIS — E87.6 POTASSIUM (K) DEFICIENCY: ICD-10-CM

## 2024-10-04 DIAGNOSIS — E78.5 HYPERLIPIDEMIA, UNSPECIFIED HYPERLIPIDEMIA TYPE: ICD-10-CM

## 2024-10-04 DIAGNOSIS — I10 ESSENTIAL HYPERTENSION: ICD-10-CM

## 2024-10-04 DIAGNOSIS — E03.9 HYPOTHYROIDISM, UNSPECIFIED TYPE: ICD-10-CM

## 2024-10-04 LAB
ANION GAP SERPL CALCULATED.3IONS-SCNC: 7 MMOL/L (ref 4–13)
BUN SERPL-MCNC: 17 MG/DL (ref 5–25)
CALCIUM SERPL-MCNC: 9.2 MG/DL (ref 8.4–10.2)
CHLORIDE SERPL-SCNC: 100 MMOL/L (ref 96–108)
CHOLEST SERPL-MCNC: 179 MG/DL
CO2 SERPL-SCNC: 31 MMOL/L (ref 21–32)
CREAT SERPL-MCNC: 1.23 MG/DL (ref 0.6–1.3)
GFR SERPL CREATININE-BSD FRML MDRD: 65 ML/MIN/1.73SQ M
GLUCOSE P FAST SERPL-MCNC: 102 MG/DL (ref 65–99)
HDLC SERPL-MCNC: 46 MG/DL
LDLC SERPL CALC-MCNC: 110 MG/DL (ref 0–100)
NONHDLC SERPL-MCNC: 133 MG/DL
POTASSIUM SERPL-SCNC: 3.7 MMOL/L (ref 3.5–5.3)
SODIUM SERPL-SCNC: 138 MMOL/L (ref 135–147)
TRIGL SERPL-MCNC: 116 MG/DL
TSH SERPL DL<=0.05 MIU/L-ACNC: 3.16 UIU/ML (ref 0.45–4.5)
VIT B12 SERPL-MCNC: 979 PG/ML (ref 180–914)

## 2024-10-04 PROCEDURE — 84443 ASSAY THYROID STIM HORMONE: CPT

## 2024-10-04 PROCEDURE — 36415 COLL VENOUS BLD VENIPUNCTURE: CPT

## 2024-10-04 PROCEDURE — 80061 LIPID PANEL: CPT

## 2024-10-04 PROCEDURE — 82607 VITAMIN B-12: CPT

## 2024-10-04 PROCEDURE — 80048 BASIC METABOLIC PNL TOTAL CA: CPT

## 2024-10-17 ENCOUNTER — OFFICE VISIT (OUTPATIENT)
Dept: FAMILY MEDICINE CLINIC | Facility: CLINIC | Age: 54
End: 2024-10-17
Payer: COMMERCIAL

## 2024-10-17 VITALS
HEART RATE: 83 BPM | SYSTOLIC BLOOD PRESSURE: 110 MMHG | DIASTOLIC BLOOD PRESSURE: 78 MMHG | HEIGHT: 64 IN | BODY MASS INDEX: 26.5 KG/M2 | OXYGEN SATURATION: 98 % | TEMPERATURE: 97.9 F | WEIGHT: 155.2 LBS

## 2024-10-17 DIAGNOSIS — R05.1 ACUTE COUGH: ICD-10-CM

## 2024-10-17 DIAGNOSIS — Z12.5 SCREENING FOR PROSTATE CANCER: ICD-10-CM

## 2024-10-17 DIAGNOSIS — Z00.00 ANNUAL PHYSICAL EXAM: Primary | ICD-10-CM

## 2024-10-17 DIAGNOSIS — R73.03 PREDIABETES: ICD-10-CM

## 2024-10-17 DIAGNOSIS — E78.5 HYPERLIPIDEMIA, UNSPECIFIED HYPERLIPIDEMIA TYPE: ICD-10-CM

## 2024-10-17 DIAGNOSIS — I10 ESSENTIAL HYPERTENSION: ICD-10-CM

## 2024-10-17 DIAGNOSIS — R20.0 LEFT ARM NUMBNESS: ICD-10-CM

## 2024-10-17 DIAGNOSIS — J45.20 MILD INTERMITTENT ASTHMA WITHOUT COMPLICATION: ICD-10-CM

## 2024-10-17 PROCEDURE — 99213 OFFICE O/P EST LOW 20 MIN: CPT

## 2024-10-17 PROCEDURE — 99396 PREV VISIT EST AGE 40-64: CPT

## 2024-10-17 RX ORDER — ATORVASTATIN CALCIUM 40 MG/1
40 TABLET, FILM COATED ORAL DAILY
Qty: 90 TABLET | Refills: 0 | Status: SHIPPED | OUTPATIENT
Start: 2024-10-17

## 2024-10-17 RX ORDER — BUDESONIDE 90 UG/1
1 AEROSOL, POWDER RESPIRATORY (INHALATION) 2 TIMES DAILY
Qty: 3 EACH | Refills: 5 | Status: SHIPPED | OUTPATIENT
Start: 2024-10-17

## 2024-10-17 RX ORDER — ALBUTEROL SULFATE 90 UG/1
2 INHALANT RESPIRATORY (INHALATION) EVERY 6 HOURS PRN
Qty: 6.7 G | Refills: 5 | Status: SHIPPED | OUTPATIENT
Start: 2024-10-17

## 2024-10-17 RX ORDER — GUAIFENESIN 600 MG/1
1200 TABLET, EXTENDED RELEASE ORAL 2 TIMES DAILY
Qty: 28 TABLET | Refills: 0 | Status: SHIPPED | OUTPATIENT
Start: 2024-10-17

## 2024-10-17 NOTE — ASSESSMENT & PLAN NOTE
Asthma well controlled. Only uses albuterol when he is around triggers and with URI cold      Plan:  -Continue current regimen: Pulmicort BID  -Continue to rinse mouth after inhaler use  -Refilled albuterol and Pulmicort.   Orders:    budesonide (Pulmicort Flexhaler) 90 MCG/ACT inhaler; Inhale 1 puff 2 (two) times a day Rinse mouth after use.    albuterol (PROVENTIL HFA,VENTOLIN HFA) 90 mcg/act inhaler; Inhale 2 puffs every 6 (six) hours as needed for wheezing or shortness of breath

## 2024-10-17 NOTE — ASSESSMENT & PLAN NOTE
Lab Results   Component Value Date    CHOLESTEROL 179 10/04/2024    TRIG 116 10/04/2024    HDL 46 10/04/2024    LDLCALC 110 (H) 10/04/2024     Continue Lipitor 40mg-refill sent    Orders:    atorvastatin (LIPITOR) 40 mg tablet; Take 1 tablet (40 mg total) by mouth daily

## 2024-10-17 NOTE — PROGRESS NOTES
Adult Annual Physical  Name: Gabriel Neves      : 1970      MRN: [id number]  Encounter Provider: hGazal Rock DO  Encounter Date: 10/17/2024   Encounter department: [facility]    Assessment & Plan  Annual physical exam  Preventative Blood work  Lipid panel for CVD-10/04/24 , T, HDL: 46, LDL: 110  BMP for diabetes-10/04/24: BUN:17, Cr: 1.23, GFR: 65   HIV- non reactive 2023  Hep C- non reactive 08/10/2021  Preventative Screenings:  Colonscopy-cologuard previously ordered  PSA ordered  Counseling  Lifestyle changes         Hyperlipidemia, unspecified hyperlipidemia type  Lab Results   Component Value Date    CHOLESTEROL 179 10/04/2024    TRIG 116 10/04/2024    HDL 46 10/04/2024    LDLCALC 110 (H) 10/04/2024     Continue Lipitor 40mg-refill sent    Orders:    atorvastatin (LIPITOR) 40 mg tablet; Take 1 tablet (40 mg total) by mouth daily    Mild intermittent asthma without complication  Asthma well controlled. Only uses albuterol when he is around triggers and with URI cold      Plan:  -Continue current regimen: Pulmicort BID  -Continue to rinse mouth after inhaler use  -Refilled albuterol and Pulmicort.   Orders:    budesonide (Pulmicort Flexhaler) 90 MCG/ACT inhaler; Inhale 1 puff 2 (two) times a day Rinse mouth after use.    albuterol (PROVENTIL HFA,VENTOLIN HFA) 90 mcg/act inhaler; Inhale 2 puffs every 6 (six) hours as needed for wheezing or shortness of breath    Acute cough  Reports 1 week of URI symptoms including productive cough, rhinorrhea, headaches. Denies fever or chills. Most likely viral URI. No wheeze or consolidation on lung exam    Plan:  Start mucinex  Start dextromethorphan  Continue albuterol q4h prn for SOB/wheeze  Continue jennifer's rub  ED precautions given  Orders:    guaiFENesin (MUCINEX) 600 mg 12 hr tablet; Take 2 tablets (1,200 mg total) by mouth 2 (two) times a day    dextromethorphan 15 MG/5ML syrup; Take 10 mL (30 mg total) by  mouth 4 (four) times a day as needed for cough    Left arm numbness  Reports intermittent left arm numbness, denies any neck pain or neck trauma or inciting injury. No handgrip weakness Previously evaluated in ED in July of numbness, CT head on 7/15/24 was unremarkable. Patient did not get updated cervical or thoracic spine Xrays. PT signed off.   Negative Spurling on exam  Most likely radiculopathy of unknown region    Plan:  F/u Mg  F/u EMG to assess location of radiculopathy   Neurology appointment on 12/4  Orders:    Ambulatory Referral to Neurology; Future    Magnesium; Future    EMG 1 Limb; Future    Screening for prostate cancer    Orders:    PSA Total (Reflex To Free); Future    Prediabetes  Slight decline in patient's kidney function from prior labs, GFR: 91>79>72>65. With elevated microalbumin at 498, indicating suspicion for prediabetes. POC HA1c: 5.7, confirming pre-diabetes. Patient on an ACE inhibitor for renal protection. Discussed lifestyle modifications and changes     Plan:  Recommend healthy lifestyle changes  Follow-up in 3 months  Orders:    Hemoglobin A1C; Future    Essential hypertension  Blood Pressure: 110/78, Goal<140/90  Kidney function at goal BUN:17, Cr: 1.23, GFR: 65   08/09/2024 Echo: LVEF 60%, normal systolic and diastolic fxn    Plan:  Continue Lisinopril 20mg daily and Amlodipine 10mg daily  Recommend less than 2,000mg of sodium per day.   Follow-up in 3 months        Immunizations and preventive care screenings were discussed with patient today. Appropriate education was printed on patient's after visit summary.    Discussed risks and benefits of prostate cancer screening. We discussed the controversial history of PSA screening for prostate cancer in the [place] as well as the risk of over detection and over treatment of prostate cancer by way of PSA screening.  The patient understands that PSA blood testing is an imperfect way to screen for prostate cancer and that elevated  PSA levels in the blood may also be caused by infection, inflammation, prostatic trauma or manipulation, urological procedures, or by benign prostatic enlargement.    The role of the digital rectal examination in prostate cancer screening was also discussed and I discussed with him that there is large interobserver variability in the findings of digital rectal examination.    Counseling:  Alcohol/drug use: discussed moderation in alcohol intake, the recommendations for healthy alcohol use, and avoidance of illicit drug use.  Dental Health: discussed importance of regular tooth brushing, flossing, and dental visits.  Injury prevention: discussed safety/seat belts, safety helmets, smoke detectors, carbon monoxide detectors, and smoking near bedding or upholstery.  Sexual health: discussed sexually transmitted diseases, partner selection, use of condoms, avoidance of unintended pregnancy, and contraceptive alternatives.  Exercise: the importance of regular exercise/physical activity was discussed. Recommend exercise 3-5 times per week for at least 30 minutes.          History of Present Illness     Adult Annual Physical  53 yo male with pmhx including allergies, mild asthma, HLD, and HTN presents for annual physical    Reports productive cough that has been going on for 1 week.  Rhinorrhea, headaches. Denies any fever or chills. Reports wheezing. Using albuterol around the clock due to wheeze. Uses vapor rub. Drinking water and chicken noodle soup.     Still numbness down arm and left flank pain. No neck trauma or pain. No positional triggers. No pain with urination. No blood with urination. He is scheduled with Neurology on 12/04/2024. Originally, presented to ED in July and August for similar numbness and tingling problem. He has not had neck xrays done. He did go to PT, however PT signed off.        HTN:  On amlodipine 10mg and Lisinopril. Does not check blood pressures at home. Does not use salt. Denies any  "lightheadedness, dizziness, chest pain, SOB, N/V.     Asthma:  Prior to cold, using albuterol only when needed. Not using unless wheeze. Allergies controlled. Avoiding tobacco smoke.      Hypothyroidism- On levothyroxine.     Review of Systems   Constitutional:  Negative for chills, fatigue, fever and unexpected weight change.   HENT:  Positive for rhinorrhea. Negative for congestion, ear pain, hearing loss and sore throat.    Eyes:  Negative for pain and visual disturbance.   Respiratory:  Positive for cough and wheezing. Negative for shortness of breath.    Cardiovascular:  Negative for chest pain and palpitations.   Gastrointestinal:  Negative for abdominal pain, blood in stool, constipation and diarrhea.   Genitourinary:  Negative for dysuria and hematuria.   Musculoskeletal:  Positive for back pain. Negative for arthralgias.   Skin:  Negative for color change and rash.   Neurological:  Positive for numbness and headaches. Negative for syncope.     Medical History Reviewed by provider this encounter:  Tobacco  Allergies  Meds  Problems  Med Hx  Surg Hx  Fam Hx         Objective     /78 (BP Location: Left arm, Patient Position: Sitting, Cuff Size: Standard)   Pulse 83   Temp 97.9 °F (36.6 °C) (Tympanic)   Ht 5' 3.6\" (1.615 m)   Wt 70.4 kg (155 lb 3.2 oz)   SpO2 98%   BMI 26.98 kg/m²     Physical Exam  Vitals and nursing note reviewed.   Constitutional:       General: He is not in acute distress.     Appearance: He is well-developed.   HENT:      Head: Normocephalic and atraumatic.      Right Ear: Tympanic membrane, ear canal and external ear normal.      Left Ear: Tympanic membrane, ear canal and external ear normal.      Nose: Rhinorrhea present. No congestion.      Mouth/Throat:      Mouth: Mucous membranes are moist.      Pharynx: Oropharynx is clear. No oropharyngeal exudate or posterior oropharyngeal erythema.   Eyes:      General:         Right eye: No discharge.         Left eye: No " discharge.      Extraocular Movements: Extraocular movements intact.      Conjunctiva/sclera: Conjunctivae normal.      Pupils: Pupils are equal, round, and reactive to light.   Cardiovascular:      Rate and Rhythm: Normal rate and regular rhythm.      Heart sounds: No murmur heard.  Pulmonary:      Effort: Pulmonary effort is normal. No respiratory distress.      Breath sounds: Normal breath sounds. No stridor. No wheezing, rhonchi or rales.   Abdominal:      General: Bowel sounds are normal. There is no distension.      Palpations: Abdomen is soft.      Tenderness: There is no abdominal tenderness. There is no guarding.   Musculoskeletal:         General: No swelling.      Cervical back: Neck supple.      Right lower leg: No edema.      Left lower leg: No edema.      Comments: Negative spurlings   Skin:     General: Skin is warm and dry.      Capillary Refill: Capillary refill takes less than 2 seconds.   Neurological:      Mental Status: He is alert.   Psychiatric:         Mood and Affect: Mood normal.

## 2024-11-06 NOTE — PATIENT INSTRUCTIONS
"Patient Education     Routine physical for adults   The Basics   Written by the doctors and editors at [facility]   What is a physical? -- A physical is a routine visit, or \"check-up,\" with your doctor. You might also hear it called a \"wellness visit\" or \"preventive visit.\"  During each visit, the doctor will:   Ask about your physical and mental health   Ask about your habits, behaviors, and lifestyle   Do an exam   Give you vaccines if needed   Talk to you about any medicines you take   Give advice about your health   Answer your questions  Getting regular check-ups is an important part of taking care of your health. It can help your doctor find and treat any problems you have. But it's also important for preventing health problems.  A routine physical is different from a \"sick visit.\" A sick visit is when you see a doctor because of a health concern or problem. Since physicals are scheduled ahead of time, you can think about what you want to ask the doctor.  How often should I get a physical? -- It depends on your age and health. In general, for people age 21 years and older:   If you are younger than 50 years, you might be able to get a physical every 3 years.   If you are 50 years or older, your doctor might recommend a physical every year.  If you have an ongoing health condition, like diabetes or high blood pressure, your doctor will probably want to see you more often.  What happens during a physical? -- In general, each visit will include:   Physical exam - The doctor or nurse will check your height, weight, heart rate, and blood pressure. They will also look at your eyes and ears. They will ask about how you are feeling and whether you have any symptoms that bother you.   Medicines - It's a good idea to bring a list of all the medicines you take to each doctor visit. Your doctor will talk to you about your medicines and answer any questions. Tell them if you are having any side effects that bother you. You " "should also tell them if you are having trouble paying for any of your medicines.   Habits and behaviors - This includes:   Your diet   Your exercise habits   Whether you smoke, drink alcohol, or use drugs   Whether you are sexually active   Whether you feel safe at home  Your doctor will talk to you about things you can do to improve your health and lower your risk of health problems. They will also offer help and support. For example, if you want to quit smoking, they can give you advice and might prescribe medicines. If you want to improve your diet or get more physical activity, they can help you with this, too.   Lab tests, if needed - The tests you get will depend on your age and situation. For example, your doctor might want to check your:   Cholesterol   Blood sugar   Iron level   Vaccines - The recommended vaccines will depend on your age, health, and what vaccines you already had. Vaccines are very important because they can prevent certain serious or deadly infections.   Discussion of screening - \"Screening\" means checking for diseases or other health problems before they cause symptoms. Your doctor can recommend screening based on your age, risk, and preferences. This might include tests to check for:   Cancer, such as breast, prostate, cervical, ovarian, colorectal, prostate, lung, or skin cancer   Sexually transmitted infections, such as chlamydia and gonorrhea   Mental health conditions like depression and anxiety  Your doctor will talk to you about the different types of screening tests. They can help you decide which screenings to have. They can also explain what the results might mean.   Answering questions - The physical is a good time to ask the doctor or nurse questions about your health. If needed, they can refer you to other doctors or specialists, too.  Adults older than 65 years often need other care, too. As you get older, your doctor will talk to you about:   How to prevent falling at " home   Hearing or vision tests   Memory testing   How to take your medicines safely   Making sure that you have the help and support you need at home  All topics are updated as new evidence becomes available and our peer review process is complete.  This topic retrieved from [employer] on: May 02, 2024.  Topic 168075 Version 1.0  Release: 32.4.3 - C32.122  © 2024 UpToDate, Inc. and/or its affiliates. All rights reserved.  Consumer Information Use and Disclaimer   Disclaimer: This generalized information is a limited summary of diagnosis, treatment, and/or medication information. It is not meant to be comprehensive and should be used as a tool to help the user understand and/or assess potential diagnostic and treatment options. It does NOT include all information about conditions, treatments, medications, side effects, or risks that may apply to a specific patient. It is not intended to be medical advice or a substitute for the medical advice, diagnosis, or treatment of a health care provider based on the health care provider's examination and assessment of a patient's specific and unique circumstances. Patients must speak with a health care provider for complete information about their health, medical questions, and treatment options, including any risks or benefits regarding use of medications. This information does not endorse any treatments or medications as safe, effective, or approved for treating a specific patient. UpToDate, Inc. and its affiliates disclaim any warranty or liability relating to this information or the use thereof.The use of this information is governed by the Terms of Use, available at https://www.[employer].com/en/know/clinical-effectiveness-terms. 2024© UpToDate, Inc. and its affiliates and/or licensors. All rights reserved.  Copyright   © 2024 UpToDate, Inc. and/or its affiliates. All rights reserved.

## 2024-11-06 NOTE — ASSESSMENT & PLAN NOTE
Blood Pressure: 110/78, Goal<140/90  Kidney function at goal BUN:17, Cr: 1.23, GFR: 65   08/09/2024 Echo: LVEF 60%, normal systolic and diastolic fxn    Plan:  Continue Lisinopril 20mg daily and Amlodipine 10mg daily  Recommend less than 2,000mg of sodium per day.   Follow-up in 3 months

## 2024-11-06 NOTE — ASSESSMENT & PLAN NOTE
Slight decline in patient's kidney function from prior labs, GFR: 91>79>72>65. With elevated microalbumin at 498, indicating suspicion for prediabetes. POC HA1c: 5.7, confirming pre-diabetes. Patient on an ACE inhibitor for renal protection. Discussed lifestyle modifications and changes     Plan:  Recommend healthy lifestyle changes  Follow-up in 3 months  Orders:    Hemoglobin A1C; Future

## 2024-11-26 ENCOUNTER — TELEPHONE (OUTPATIENT)
Dept: NEUROLOGY | Facility: CLINIC | Age: 54
End: 2024-11-26

## 2024-11-26 NOTE — TELEPHONE ENCOUNTER
LMOM to confirm appointment     Apt Date- 12/04/2024    Please call office to confirm apt at [phone]

## 2024-12-03 ENCOUNTER — TELEPHONE (OUTPATIENT)
Age: 54
End: 2024-12-03

## 2024-12-04 NOTE — TELEPHONE ENCOUNTER
Patient has called in to inform us that he is unable to make his appointment . I have rescheduled for next PM available in February.      Patient states he was not sure which bus will bring him to the [facility].      Patient is requesting a letter with directions to the office to be mailed to the address on chart.

## 2024-12-04 NOTE — TELEPHONE ENCOUNTER
I have called patient back and advised of providers change. Provided new appointment date time and location. Alicia understood.

## 2025-01-06 ENCOUNTER — HOSPITAL ENCOUNTER (OUTPATIENT)
Dept: NEUROLOGY | Facility: CLINIC | Age: 55
Discharge: HOME | End: 2025-01-06
Payer: COMMERCIAL

## 2025-01-06 DIAGNOSIS — R20.0 LEFT ARM NUMBNESS: ICD-10-CM

## 2025-01-06 PROCEDURE — 95909 NRV CNDJ TST 5-6 STUDIES: CPT | Performed by: PSYCHIATRY & NEUROLOGY

## 2025-01-06 PROCEDURE — 95886 MUSC TEST DONE W/N TEST COMP: CPT | Performed by: PSYCHIATRY & NEUROLOGY

## 2025-01-13 ENCOUNTER — RESULTS FOLLOW-UP (OUTPATIENT)
Dept: ENDOCRINOLOGY | Facility: CLINIC | Age: 55
End: 2025-01-13

## 2025-01-21 ENCOUNTER — OFFICE VISIT (OUTPATIENT)
Dept: FAMILY MEDICINE CLINIC | Facility: CLINIC | Age: 55
End: 2025-01-21
Payer: COMMERCIAL

## 2025-01-21 VITALS
WEIGHT: 153 LBS | OXYGEN SATURATION: 98 % | TEMPERATURE: 98.7 F | HEART RATE: 92 BPM | BODY MASS INDEX: 27.11 KG/M2 | DIASTOLIC BLOOD PRESSURE: 82 MMHG | SYSTOLIC BLOOD PRESSURE: 157 MMHG | HEIGHT: 63 IN

## 2025-01-21 DIAGNOSIS — J45.20 MILD INTERMITTENT ASTHMA WITHOUT COMPLICATION: ICD-10-CM

## 2025-01-21 DIAGNOSIS — M62.830 BACK SPASM: ICD-10-CM

## 2025-01-21 DIAGNOSIS — M19.071 OSTEOARTHRITIS OF BOTH ANKLES, UNSPECIFIED OSTEOARTHRITIS TYPE: ICD-10-CM

## 2025-01-21 DIAGNOSIS — E03.9 HYPOTHYROIDISM, UNSPECIFIED TYPE: ICD-10-CM

## 2025-01-21 DIAGNOSIS — R20.0 LEFT ARM NUMBNESS: Primary | ICD-10-CM

## 2025-01-21 DIAGNOSIS — B35.3 TINEA PEDIS OF LEFT FOOT: ICD-10-CM

## 2025-01-21 DIAGNOSIS — J30.1 ALLERGIC RHINITIS DUE TO POLLEN, UNSPECIFIED SEASONALITY: ICD-10-CM

## 2025-01-21 DIAGNOSIS — M19.072 OSTEOARTHRITIS OF BOTH ANKLES, UNSPECIFIED OSTEOARTHRITIS TYPE: ICD-10-CM

## 2025-01-21 DIAGNOSIS — E78.5 HYPERLIPIDEMIA, UNSPECIFIED HYPERLIPIDEMIA TYPE: ICD-10-CM

## 2025-01-21 DIAGNOSIS — I10 ESSENTIAL HYPERTENSION: ICD-10-CM

## 2025-01-21 PROCEDURE — 99213 OFFICE O/P EST LOW 20 MIN: CPT

## 2025-01-21 RX ORDER — ALBUTEROL SULFATE 90 UG/1
2 INHALANT RESPIRATORY (INHALATION) EVERY 6 HOURS PRN
Qty: 6.7 G | Refills: 5 | Status: SHIPPED | OUTPATIENT
Start: 2025-01-21

## 2025-01-21 RX ORDER — LISINOPRIL 20 MG/1
20 TABLET ORAL DAILY
Qty: 90 TABLET | Refills: 1 | Status: SHIPPED | OUTPATIENT
Start: 2025-01-21

## 2025-01-21 RX ORDER — BUDESONIDE 90 UG/1
1 AEROSOL, POWDER RESPIRATORY (INHALATION) 2 TIMES DAILY
Qty: 3 EACH | Refills: 5 | Status: SHIPPED | OUTPATIENT
Start: 2025-01-21

## 2025-01-21 RX ORDER — LEVOTHYROXINE SODIUM 25 UG/1
25 TABLET ORAL DAILY
Qty: 90 TABLET | Refills: 1 | Status: SHIPPED | OUTPATIENT
Start: 2025-01-21

## 2025-01-21 RX ORDER — METHOCARBAMOL 500 MG/1
500 TABLET, FILM COATED ORAL 4 TIMES DAILY
Qty: 20 TABLET | Refills: 0 | Status: SHIPPED | OUTPATIENT
Start: 2025-01-21 | End: 2025-01-26

## 2025-01-21 RX ORDER — ATORVASTATIN CALCIUM 40 MG/1
40 TABLET, FILM COATED ORAL DAILY
Qty: 90 TABLET | Refills: 0 | Status: SHIPPED | OUTPATIENT
Start: 2025-01-21

## 2025-01-21 RX ORDER — NYSTATIN 100000 U/G
CREAM TOPICAL 2 TIMES DAILY
Qty: 30 G | Refills: 0 | Status: SHIPPED | OUTPATIENT
Start: 2025-01-21

## 2025-01-21 RX ORDER — FLUTICASONE PROPIONATE 50 MCG
1 SPRAY, SUSPENSION (ML) NASAL DAILY
Qty: 9.9 ML | Refills: 1 | Status: SHIPPED | OUTPATIENT
Start: 2025-01-21

## 2025-01-21 RX ORDER — NAPROXEN 500 MG/1
500 TABLET ORAL 2 TIMES DAILY WITH MEALS
Start: 2025-01-21

## 2025-01-21 RX ORDER — AMLODIPINE BESYLATE 10 MG/1
10 TABLET ORAL DAILY
Qty: 90 TABLET | Refills: 2 | Status: SHIPPED | OUTPATIENT
Start: 2025-01-21

## 2025-01-21 RX ORDER — CETIRIZINE HYDROCHLORIDE 10 MG/1
10 TABLET ORAL DAILY
Qty: 20 TABLET | Refills: 0 | Status: SHIPPED | OUTPATIENT
Start: 2025-01-21 | End: 2025-02-10

## 2025-01-21 NOTE — ASSESSMENT & PLAN NOTE
Continue Levothyroxine  F/u TSH and T4  Orders:    TSH, 3rd generation; Future    T4, free; Future    levothyroxine 25 mcg tablet; Take 1 tablet (25 mcg total) by mouth daily

## 2025-01-21 NOTE — ASSESSMENT & PLAN NOTE
Blood Pressure: 157/82, Goal<140/90  Kidney function at goal BUN:17, Cr: 1.23, GFR: 65   08/09/2024 Echo: LVEF 60%, normal systolic and diastolic fxn    Plan:  Continue Lisinopril 20mg daily and Amlodipine 10mg daily  Recommend less than 2,000mg of sodium per day.   Follow-up in 3 months   Orders:    lisinopril (ZESTRIL) 20 mg tablet; Take 1 tablet (20 mg total) by mouth daily    amLODIPine (NORVASC) 10 mg tablet; Take 1 tablet (10 mg total) by mouth daily

## 2025-01-21 NOTE — PROGRESS NOTES
Ambulatory Visit  Name: Gabriel Neves      : 1970      MRN: [id number]  Encounter Provider: Ghazal Rock DO  Encounter Date: 2025   Encounter department: [facility]    Assessment & Plan  Left arm numbness  Upper back and left arm tingling related to physical activity/muscle activity. Suspect upper back spasm causing nerve entrapment leading to symptoms. Will trial Robaxin and Naproxen.   Orders:    naproxen (Naprosyn) 500 mg tablet; Take 1 tablet (500 mg total) by mouth 2 (two) times a day with meals As needed for pain    Back spasm  Upper back and left arm tingling related to physical activity/muscle activty. Suspect upper back spasm causing nerve entrapment leading to symptoms. Will trial Robaxin and Naproxen.   Orders:    methocarbamol (ROBAXIN) 500 mg tablet; Take 1 tablet (500 mg total) by mouth 4 (four) times a day for 5 days Prn muscle spasms    Essential hypertension  Blood Pressure: 157/82, Goal<140/90  Kidney function at goal BUN:17, Cr: 1.23, GFR: 65   2024 Echo: LVEF 60%, normal systolic and diastolic fxn    Plan:  Continue Lisinopril 20mg daily and Amlodipine 10mg daily  Recommend less than 2,000mg of sodium per day.   Follow-up in 3 months   Orders:    lisinopril (ZESTRIL) 20 mg tablet; Take 1 tablet (20 mg total) by mouth daily    amLODIPine (NORVASC) 10 mg tablet; Take 1 tablet (10 mg total) by mouth daily      Mild intermittent asthma without complication  Asthma well controlled. Only uses albuterol when he is around triggers and with URI cold      Plan:  -Continue current regimen: Pulmicort BID  -Continue to rinse mouth after inhaler use  -continue Zyrtec  -Refilled albuterol and Pulmicort.   Orders:    budesonide (Pulmicort Flexhaler) 90 MCG/ACT inhaler; Inhale 1 puff 2 (two) times a day Rinse mouth after use.    cetirizine (ZyrTEC Allergy) 10 mg tablet; Take 1 tablet (10 mg total) by mouth daily for 20 days    albuterol (PROVENTIL  HFA,VENTOLIN HFA) 90 mcg/act inhaler; Inhale 2 puffs every 6 (six) hours as needed for wheezing or shortness of breath      Hypothyroidism, unspecified type  Continue Levothyroxine  F/u TSH and T4  Orders:    TSH, 3rd generation; Future    T4, free; Future    levothyroxine 25 mcg tablet; Take 1 tablet (25 mcg total) by mouth daily      Hyperlipidemia, unspecified hyperlipidemia type  Lab Results   Component Value Date    CHOLESTEROL 179 10/04/2024    TRIG 116 10/04/2024    HDL 46 10/04/2024    LDLCALC 110 (H) 10/04/2024     Continue Atorvastatin 40mg  Orders:    atorvastatin (LIPITOR) 40 mg tablet; Take 1 tablet (40 mg total) by mouth daily    Allergic rhinitis due to pollen, unspecified seasonality  Continue Zyrtec and Flonase   Orders:    cetirizine (ZyrTEC Allergy) 10 mg tablet; Take 1 tablet (10 mg total) by mouth daily for 20 days    fluticasone (FLONASE) 50 mcg/act nasal spray; 1 spray into each nostril daily    Osteoarthritis of both ankles, unspecified osteoarthritis type    Orders:    Diclofenac Sodium (VOLTAREN) 1 %; Apply 2 g topically 4 (four) times a day For pain to affected area    Tinea pedis of left foot    Orders:    nystatin (MYCOSTATIN) cream; Apply topically 2 (two) times a day      Depression Screening and Follow-up Plan: Patient was screened for depression during today's encounter. They screened negative with a PHQ-2 score of 0.      History of Present Illness     HPI  54 yo male with pmhx including allergies, mild asthma, HLD, and HTN presents for HTN and Asthma follow up.     Reports electrical shock up arm and goes to back and neck. Every day. Every day movements bring on the pain like shoveling and lifting make it worse.   Reports tingling in the back, No rash. Tingling goes into to neck and back. Denies any dysuria      HTN:  On amlodipine 10mg and Lisinopril. Does not use salt. Reports eating turkey but not lunch meats. Trying to eat more fruits and vegetables.    Asthma:  Uses albuterol  only when needed. Not using unless wheeze. Hasn't needed to use albuterol rescue. Allergies controlled. Avoiding tobacco smoke. Didn't caught any colds all summer.     Hypothyroidism- On levothyroxine. No hypothyroid or hyperthyroid symptoms       History obtained from : patient  Review of Systems   Constitutional:  Negative for chills, diaphoresis, fatigue, fever and unexpected weight change.   HENT:  Negative for congestion, ear pain, hearing loss, rhinorrhea and sore throat.    Eyes:  Negative for pain and visual disturbance.   Respiratory:  Negative for cough, shortness of breath and wheezing.    Cardiovascular:  Negative for chest pain, palpitations and leg swelling.   Gastrointestinal:  Negative for abdominal pain, blood in stool, constipation, diarrhea, nausea and vomiting.   Genitourinary:  Negative for dysuria and hematuria.   Musculoskeletal:  Positive for back pain. Negative for arthralgias.   Skin:  Negative for color change and rash.   Neurological:  Positive for numbness. Negative for dizziness, weakness, light-headedness and headaches.     Past Medical History   Past Medical History:   Diagnosis Date    Disease of thyroid gland     HTN (hypertension)     Hypertension      No past surgical history on file.  Family History   Problem Relation Age of Onset    No Known Problems Mother     No Known Problems Father     Diabetes Maternal Grandmother      Current Outpatient Medications on File Prior to Visit   Medication Sig Dispense Refill    Menthol-Methyl Salicylate (ZACH NESS GREASELESS) 10-15 % greaseless cream Apply topically daily at bedtime 30 g 0     No current facility-administered medications on file prior to visit.     Allergies   Allergen Reactions    Nuts - Food Allergy Swelling     Lip swelling    Pollen Extract Sneezing      Current Outpatient Medications on File Prior to Visit   Medication Sig Dispense Refill    Menthol-Methyl Salicylate (ZACH NESS GREASELESS) 10-15 % greaseless cream Apply  "topically daily at bedtime 30 g 0     No current facility-administered medications on file prior to visit.      Social History     Tobacco Use    Smoking status: Never    Smokeless tobacco: Never   Vaping Use    Vaping status: Never Used   Substance and Sexual Activity    Alcohol use: Yes     Comment:    Occasional    Drug use: Not Currently    Sexual activity: Not Currently     Partners: Female     Birth control/protection: Condom Male         Objective     /82 (BP Location: Right arm, Patient Position: Sitting, Cuff Size: Standard)   Pulse 92   Temp 98.7 °F (37.1 °C) (Temporal)   Ht 5' 3\" (1.6 m)   Wt 69.4 kg (153 lb)   SpO2 98%   BMI 27.10 kg/m²     Physical Exam  Vitals and nursing note reviewed.   Constitutional:       General: He is not in acute distress.     Appearance: He is well-developed.   HENT:      Head: Normocephalic and atraumatic.   Eyes:      Conjunctiva/sclera: Conjunctivae normal.   Cardiovascular:      Rate and Rhythm: Normal rate and regular rhythm.      Heart sounds: No murmur heard.  Pulmonary:      Effort: Pulmonary effort is normal. No respiratory distress.      Breath sounds: Normal breath sounds.   Abdominal:      Palpations: Abdomen is soft.      Tenderness: There is no abdominal tenderness.   Musculoskeletal:         General: No swelling.      Cervical back: Neck supple.      Comments: Negative Spurling.  Positive Tinel's and Positive Elbow Tinel's test  Strength in upper extremity intact and symmetrical  Negative spinal tenderness  Negative straight leg test     Skin:     General: Skin is warm and dry.      Capillary Refill: Capillary refill takes less than 2 seconds.   Neurological:      Mental Status: He is alert.      Comments: Reflexes intact and symmetrical   Psychiatric:         Mood and Affect: Mood normal.       "

## 2025-01-21 NOTE — PATIENT INSTRUCTIONS
Methacarbamol 500mg by mouth four times per day as needed for pain for the the next 5 days. This will make you sleepy so avoid any heavy machinery or working     You can also take Naproxen 500mg twice per day (every 12 hours apart) take with meals.     OMT for medical massage in 2 weeks at [facility]

## 2025-01-21 NOTE — ASSESSMENT & PLAN NOTE
Asthma well controlled. Only uses albuterol when he is around triggers and with URI cold      Plan:  -Continue current regimen: Pulmicort BID  -Continue to rinse mouth after inhaler use  -continue Zyrtec  -Refilled albuterol and Pulmicort.   Orders:    budesonide (Pulmicort Flexhaler) 90 MCG/ACT inhaler; Inhale 1 puff 2 (two) times a day Rinse mouth after use.    cetirizine (ZyrTEC Allergy) 10 mg tablet; Take 1 tablet (10 mg total) by mouth daily for 20 days    albuterol (PROVENTIL HFA,VENTOLIN HFA) 90 mcg/act inhaler; Inhale 2 puffs every 6 (six) hours as needed for wheezing or shortness of breath

## 2025-01-28 NOTE — ASSESSMENT & PLAN NOTE
Upper back and left arm tingling related to physical activity/muscle activity. Suspect upper back spasm causing nerve entrapment leading to symptoms. Will trial Robaxin and Naproxen.   Orders:    naproxen (Naprosyn) 500 mg tablet; Take 1 tablet (500 mg total) by mouth 2 (two) times a day with meals As needed for pain

## 2025-01-28 NOTE — ASSESSMENT & PLAN NOTE
Orders:    Diclofenac Sodium (VOLTAREN) 1 %; Apply 2 g topically 4 (four) times a day For pain to affected area

## 2025-01-28 NOTE — ASSESSMENT & PLAN NOTE
Continue Zyrtec and Flonase   Orders:    cetirizine (ZyrTEC Allergy) 10 mg tablet; Take 1 tablet (10 mg total) by mouth daily for 20 days    fluticasone (FLONASE) 50 mcg/act nasal spray; 1 spray into each nostril daily

## 2025-01-28 NOTE — ASSESSMENT & PLAN NOTE
Lab Results   Component Value Date    CHOLESTEROL 179 10/04/2024    TRIG 116 10/04/2024    HDL 46 10/04/2024    LDLCALC 110 (H) 10/04/2024     Continue Atorvastatin 40mg  Orders:    atorvastatin (LIPITOR) 40 mg tablet; Take 1 tablet (40 mg total) by mouth daily

## 2025-02-03 ENCOUNTER — HOSPITAL ENCOUNTER (OUTPATIENT)
Dept: RADIOLOGY | Facility: HOSPITAL | Age: 55
Discharge: HOME | End: 2025-02-03
Payer: COMMERCIAL

## 2025-02-03 ENCOUNTER — APPOINTMENT (OUTPATIENT)
Dept: LAB | Facility: HOSPITAL | Age: 55
End: 2025-02-03
Payer: COMMERCIAL

## 2025-02-03 DIAGNOSIS — E03.9 HYPOTHYROIDISM, UNSPECIFIED TYPE: ICD-10-CM

## 2025-02-03 DIAGNOSIS — R20.0 LEFT ARM NUMBNESS: ICD-10-CM

## 2025-02-03 DIAGNOSIS — Z12.5 SCREENING FOR PROSTATE CANCER: ICD-10-CM

## 2025-02-03 DIAGNOSIS — R73.03 PREDIABETES: ICD-10-CM

## 2025-02-03 LAB
MAGNESIUM SERPL-MCNC: 2 MG/DL (ref 1.9–2.7)
PSA SERPL-MCNC: 2.09 NG/ML (ref 0–4)
T4 FREE SERPL-MCNC: 0.67 NG/DL (ref 0.61–1.12)
TSH SERPL DL<=0.05 MIU/L-ACNC: 1.95 UIU/ML (ref 0.45–4.5)

## 2025-02-03 PROCEDURE — 72050 X-RAY EXAM NECK SPINE 4/5VWS: CPT

## 2025-02-03 PROCEDURE — 84443 ASSAY THYROID STIM HORMONE: CPT

## 2025-02-03 PROCEDURE — 83735 ASSAY OF MAGNESIUM: CPT

## 2025-02-03 PROCEDURE — 84439 ASSAY OF FREE THYROXINE: CPT

## 2025-02-03 PROCEDURE — 36415 COLL VENOUS BLD VENIPUNCTURE: CPT

## 2025-02-03 PROCEDURE — 83036 HEMOGLOBIN GLYCOSYLATED A1C: CPT

## 2025-02-03 PROCEDURE — 84153 ASSAY OF PSA TOTAL: CPT

## 2025-02-03 PROCEDURE — 72072 X-RAY EXAM THORAC SPINE 3VWS: CPT

## 2025-02-04 ENCOUNTER — PROCEDURE VISIT (OUTPATIENT)
Dept: FAMILY MEDICINE CLINIC | Facility: CLINIC | Age: 55
End: 2025-02-04
Payer: COMMERCIAL

## 2025-02-04 ENCOUNTER — RESULTS FOLLOW-UP (OUTPATIENT)
Dept: FAMILY MEDICINE CLINIC | Facility: CLINIC | Age: 55
End: 2025-02-04

## 2025-02-04 DIAGNOSIS — M62.838 TRAPEZIUS MUSCLE SPASM: ICD-10-CM

## 2025-02-04 DIAGNOSIS — M62.830 BACK SPASM: Primary | ICD-10-CM

## 2025-02-04 DIAGNOSIS — M99.01 SOMATIC DYSFUNCTION OF CERVICAL REGION: ICD-10-CM

## 2025-02-04 DIAGNOSIS — M99.02 SOMATIC DYSFUNCTION OF THORACIC REGION: ICD-10-CM

## 2025-02-04 DIAGNOSIS — M99.00 SOMATIC DYSFUNCTION OF HEAD REGION: ICD-10-CM

## 2025-02-04 LAB
EST. AVERAGE GLUCOSE BLD GHB EST-MCNC: 120 MG/DL
HBA1C MFR BLD: 5.8 %

## 2025-02-04 PROCEDURE — 99213 OFFICE O/P EST LOW 20 MIN: CPT

## 2025-02-04 NOTE — PROGRESS NOTES
The Assessment & Plan     This is a 54 y.o. male who presents for OMT follow-up for:  1. Back spasm  OMT      2. Trapezius muscle spasm  OMT      3. Somatic dysfunction of head region  OMT      4. Somatic dysfunction of cervical region  OMT      5. Somatic dysfunction of thoracic region  OMT           1. Patient tolerated OMT well for the above problems,  advised patient to drink fluids and can use NSAID for soreness after treatment     2. OMT Follow up in 2 weeks.    3.  Discuss we will try OMT for 2 sessions, if back pain or paresthesias does not improve, will refer to spine.     Subjective     Gabriel Neves is a 54 y.o. male and is here for a OMT follow up. The patient reports upper thoracic back spasm and bilateral upper extremity paresthesia.  Reports only taking Robaxin once per day, denies any changes to paresthesia in upper arms.    Reviewed Xray:  Cervical xray: Mild neural femoral narrowing bilaterally more pronounced on the right. Mild to moderate spondylotic changes worse at C3-C4 and C5-C6  Thoracic xray: No acute osseous abnormality.     Labwork:  Hemoglobin A1c 5.8, meeting prediabetes. No thyroid disease. Magnesium normal. PSA normal.     Is the patient taking Pain medication? yes  Has the patient completed physical therapy for this condition? yes. PT discharged patient due to not feeling it will help with pain/paresthesia.  Did Patient symptoms improve from last OMT appointment? First OMT konstantin    The following portions of the patient's history were reviewed and updated as appropriate: allergies, current medications, past family history, past medical history, past social history, past surgical history, and problem list.    Review of Systems  Review of Systems   Genitourinary:  Negative for difficulty urinating.   Musculoskeletal:  Positive for back pain. Negative for neck pain.   Neurological:  Positive for numbness. Negative for weakness.         Objective     OMT Exam     OMT    Performed by:  Ghazal Rock DO  Authorized by: Ghazal Rock DO  Universal Protocol:  procedure performed by consultantConsent: Verbal consent obtained.  Consent given by: patient  Patient identity confirmed: verbally with patient      Procedure Details:     Region evaluated and treated:  Head, Cervical and Thoracic    Thoracic Information  Thoracic Region: T1 - T4  Head Details:     Examination Method:  Tissue Texture Change, Stability, Laxity, Effusions, Tone, Asymmetry, Misalignment, Crepitation, Defects, Masses and Tenderness, Pain    Severity:  Severe    Osteopathic Findings:  Right occipital paraspinal hypertonicity  Right cervical paraspinal hypertonicity  Left trapezius paraspinal hypertonicity  C spine SRRR  Left transverse C3 tenderpoint    Treatment Method:  Counterstrain Treatment, Direct Treatment, Indirect Treatment, Muscle Energy Treatment, Myofascial Release Treatment and Soft Tissue Treatment    Response:  Resolved  - The somatic dysfunction is completed resovled without evidence of it ever having been present.    Cervical Details:     Examination Method:  Tissue Texture Change, Stability, Laxity, Effusions, Tone, Asymmetry, Misalignment, Crepitation, Defects, Masses and Tenderness, Pain    Severity:  Severe    Osteopathic Findings:  Right occipital paraspinal hypertonicity  Right cervical paraspinal hypertonicity  Left trapezius paraspinal hypertonicity  C spine SRRR  Left transverse C3 tenderpoint    Treatment Method:  Counterstrain Treatment, Direct Treatment, Indirect Treatment, Muscle Energy Treatment, Myofascial Release Treatment and Soft Tissue Treatment    Response:  Resolved - The somatic dysfunction is completely resolved without evidence of it ever having been present.    Thoracic T1 - T4 details:     Examination Method:  Tissue Texture Change, Stability, Laxity, Effusions, Tone, Asymmetry, Misalignment, Crepitation, Defects, Masses and Tenderness, Pain    Severity:  Severe     Osteopathic Findings:  Left thoracic paraspinal hypertonicity and tenderness  Restricted left scapula motion  Left scapula trigger points  Thoracic spine rotated left    Treatment Method:  Balanced Ligamentous Tension, Ligamentous Articular Strain Treatment, Muscle Energy Treatment, Myofascial Release Treatment, Soft Tissue Treatment, Direct Treatment and Indirect Treatment    Response:  Improved    Lumbar details:     Examination Method:  Tissue Texture Change, Stability, Laxity, Effusions, Tone, Asymmetry, Misalignment, Crepitation, Defects, Masses and Tenderness, Pain    Severity:  Severe    Osteopathic Findings:  Bilateral lumbar paraspinal hypertonicity   Bilateral Quadratus Lumborum       Treatment Method:  Balanced Ligamentous Tension, Ligamentous Articular Strain Treatment, Direct Treatment, Indirect Treatment, Muscle Energy Treatment, Myofascial Release Treatment and Soft Tissue Treatment    Response:  Resolved - The somatic dysfunction is completely resolved without evidence of it ever having been present.    Total Regions Treated:  3  Attending provider present in exam room for procedure: No

## 2025-02-18 ENCOUNTER — TELEPHONE (OUTPATIENT)
Dept: NEUROLOGY | Facility: CLINIC | Age: 55
End: 2025-02-18

## 2025-02-18 NOTE — TELEPHONE ENCOUNTER
Confirmed PT appointment on 2/24 at 1:30pm with Dr. Courtney at the [address] [place] [address] location.

## 2025-02-24 ENCOUNTER — CONSULT (OUTPATIENT)
Dept: NEUROLOGY | Facility: CLINIC | Age: 55
End: 2025-02-24
Payer: COMMERCIAL

## 2025-02-24 VITALS
BODY MASS INDEX: 26.75 KG/M2 | HEART RATE: 89 BPM | HEIGHT: 63 IN | OXYGEN SATURATION: 98 % | DIASTOLIC BLOOD PRESSURE: 98 MMHG | TEMPERATURE: 98.4 F | WEIGHT: 151 LBS | SYSTOLIC BLOOD PRESSURE: 164 MMHG

## 2025-02-24 DIAGNOSIS — R20.0 LEFT ARM NUMBNESS: ICD-10-CM

## 2025-02-24 DIAGNOSIS — M79.2 NEUROPATHIC PAIN: ICD-10-CM

## 2025-02-24 DIAGNOSIS — M48.02 CERVICAL STENOSIS OF SPINAL CANAL: ICD-10-CM

## 2025-02-24 DIAGNOSIS — R29.2 HYPERREFLEXIA: Primary | ICD-10-CM

## 2025-02-24 PROCEDURE — 99245 OFF/OP CONSLTJ NEW/EST HI 55: CPT | Performed by: PSYCHIATRY & NEUROLOGY

## 2025-02-24 RX ORDER — GABAPENTIN 100 MG/1
100 CAPSULE ORAL 3 TIMES DAILY
Qty: 90 CAPSULE | Refills: 3 | Status: SHIPPED | OUTPATIENT
Start: 2025-02-24

## 2025-02-24 NOTE — ASSESSMENT & PLAN NOTE
Patient is a 54-year-old male with history of hypertension, hyperlipidemia, chronic kidney disease, hypothyroidism, mild intermittent asthma, osteoarthritis of bilateral ankles and prediabetes who presents for evaluation of paresthesias. Patient reports having a numbness/tingling pain in bilateral upper extremities as well as spasms of his L costal -subcostal area. Sx continuous since June 2024 however seem to come in an episodic fashion. Unclear aggravating and alleviating factors. On physical exam today patient with brisk reflexes in the upper and lower extremities. He has a positive hoffmans reflex on the L.     At this time, I am am concerned for involvement of the CNS. Patient without any CN involvement. Suspect cervical spine stenosis. Will do an MRI of the cervical spine without contrast. If this is negative will consider MRI brain. For pain control recommend Gabapentin 100 mg tid. Counseled on side effects. Follow up in 3 months.     Orders:    MRI cervical spine wo contrast; Future    gabapentin (Neurontin) 100 mg capsule; Take 1 capsule (100 mg total) by mouth 3 (three) times a day

## 2025-02-24 NOTE — PROGRESS NOTES
Name: Gabriel Neves      : 1970      MRN: [id number]  Encounter Provider: Lois Courtney MD  Encounter Date: 2025   Encounter department: [facility]  :  Assessment & Plan  Neuropathic pain  Patient is a 54-year-old male with history of hypertension, hyperlipidemia, chronic kidney disease, hypothyroidism, mild intermittent asthma, osteoarthritis of bilateral ankles and prediabetes who presents for evaluation of paresthesias. Patient reports having a numbness/tingling pain in bilateral upper extremities as well as spasms of his L costal -subcostal area. Sx continuous since 2024 however seem to come in an episodic fashion. Unclear aggravating and alleviating factors. On physical exam today patient with brisk reflexes in the upper and lower extremities. He has a positive hoffmans reflex on the L.     At this time, I am am concerned for involvement of the CNS. Patient without any CN involvement. Suspect cervical spine stenosis. Will do an MRI of the cervical spine without contrast. If this is negative will consider MRI brain. For pain control recommend Gabapentin 100 mg tid. Counseled on side effects. Follow up in 3 months.     Orders:    MRI cervical spine wo contrast; Future    gabapentin (Neurontin) 100 mg capsule; Take 1 capsule (100 mg total) by mouth 3 (three) times a day    Left arm numbness    Orders:    Ambulatory Referral to Neurology    MRI cervical spine wo contrast; Future    Hyperreflexia    Orders:    MRI cervical spine wo contrast; Future    Cervical stenosis of spinal canal    Orders:    MRI cervical spine wo contrast; Future          History of Present Illness   HPI   Patient is a 54-year-old male with history of hypertension, hyperlipidemia, chronic kidney disease, hypothyroidism, mild intermittent asthma, osteoarthritis of bilateral ankles and prediabetes who presents for evaluation of paresthesias.    Symptoms began in 2024 after exposure  "to the heat. He reports numbness and tingling affecting bilateral upper extremities and the left thoracic area below the nipple line.  He reports having a \"tightening\" of bilateral arms and they left rib cage.  He describes having a spasm just limited to the left rib cage.  In the arms he feels a burning and numbness/tingling sensation.  Patient reports that symptoms have been continuous since onset however at times states it occurs in an episodic fashion.  He denies any identifiable Deivert no pattern of symptoms.  He denies a preceding accident or trauma.  He can have a transient weakness.  He denies involvement of the lower extremities, trouble walking, falls, vision difficulties, urinary symptoms, neck pain or skin rashes.  Patient was treated with Robaxin which helped slightly but ultimately symptoms return.    Workup:  XR cervical spine (02/03/2025): Mild neural femoral narrowing bilaterally more pronounced on the right. Mild to moderate spondylotic changes worse at C3-C4 and C5-C6.    XR T spine: There is no fracture or pathologic bone lesion. Thoracic vertebral alignment is within normal limits. No significant degenerative changes. There is no displacement of the paraspinal line. The pedicles appear intact.    EMG (10/17/2024): Nerve conduction studies were performed on left upper extremity. Needle EMG was performed with a disposable concentric needle using selected muscles of the left upper extremity. 1) Median, ulnar and radial sensory nerve action potentials were normal. 2) Median and ulnar compound motor action potentials from the APB and ADM were normal. All F-waves were normal. 3) EMG of all tested muscles was normal.       Review of Systems   Constitutional:  Negative for appetite change, fatigue and fever.   HENT: Negative.  Negative for hearing loss, tinnitus, trouble swallowing and voice change.    Eyes: Negative.  Negative for photophobia, pain and visual disturbance.   Respiratory: Negative.  " "Negative for shortness of breath.    Cardiovascular: Negative.  Negative for palpitations.   Gastrointestinal: Negative.  Negative for nausea and vomiting.   Endocrine: Negative.  Negative for cold intolerance.   Genitourinary: Negative.  Negative for dysuria, frequency and urgency.   Musculoskeletal:  Negative for back pain, gait problem, myalgias, neck pain and neck stiffness.   Skin: Negative.  Negative for rash.   Allergic/Immunologic: Negative.    Neurological:  Positive for numbness. Negative for dizziness, tremors, seizures, syncope, facial asymmetry, speech difficulty, weakness, light-headedness and headaches.        Pt presents with constant numbness/burning/tingling sensations bilateral in his arms. Says he's also experiencing tightness around his left rib mid section around. This all started around June of 2024.   Hematological: Negative.  Does not bruise/bleed easily.   Psychiatric/Behavioral: Negative.  Negative for confusion, hallucinations and sleep disturbance.    All other systems reviewed and are negative.   I have personally reviewed the MA's review of systems and made changes as necessary.         Objective   /98 (BP Location: Left arm, Patient Position: Sitting, Cuff Size: Standard)   Pulse 89   Temp 98.4 °F (36.9 °C) (Temporal)   Ht 5' 3\" (1.6 m)   Wt 68.5 kg (151 lb)   SpO2 98%   BMI 26.75 kg/m²     Physical Exam  Eyes:      General: Lids are normal.      Extraocular Movements: Extraocular movements intact.   Neurological:      Motor: Motor strength is normal.     Deep Tendon Reflexes:      Reflex Scores:       Tricep reflexes are 2+ on the right side and 2+ on the left side.       Bicep reflexes are 2+ on the right side and 2+ on the left side.       Brachioradialis reflexes are 2+ on the right side and 2+ on the left side.       Patellar reflexes are 2+ on the right side and 2+ on the left side.      Neurological Exam  Mental Status  Awake, alert and oriented to person, place and " time.    Cranial Nerves  CN III, IV, VI: Extraocular movements intact bilaterally. Normal lids and orbits bilaterally.  CN VII: Full and symmetric facial movement.  CN VIII: Hearing is normal.  CN XII: Tongue midline without atrophy or fasciculations.    Motor   Strength is 5/5 throughout all four extremities.    Sensory  Light touch is normal in upper and lower extremities. Pinprick is normal in upper and lower extremities.     Reflexes                                            Right                      Left  Brachioradialis                    2+                         2+  Biceps                                 2+                         2+  Triceps                                2+                         2+  Patellar                                2+                         2+    Right pathological reflexes: Santosh's absent.  Left pathological reflexes: Santosh's present.  Brisk reflexes all throughout .    Gait  Casual gait is normal including stance, stride, and arm swing.

## 2025-02-25 ENCOUNTER — PROCEDURE VISIT (OUTPATIENT)
Dept: FAMILY MEDICINE CLINIC | Facility: CLINIC | Age: 55
End: 2025-02-25
Payer: COMMERCIAL

## 2025-02-25 DIAGNOSIS — M99.03 SOMATIC DYSFUNCTION OF LUMBAR REGION: ICD-10-CM

## 2025-02-25 DIAGNOSIS — M99.01 SOMATIC DYSFUNCTION OF SPINE, CERVICAL: ICD-10-CM

## 2025-02-25 DIAGNOSIS — M99.02 SOMATIC DYSFUNCTION OF THORACIC REGION: ICD-10-CM

## 2025-02-25 DIAGNOSIS — R20.2 HAND TINGLING: ICD-10-CM

## 2025-02-25 DIAGNOSIS — M54.50 LUMBAR BACK PAIN: Primary | ICD-10-CM

## 2025-02-25 DIAGNOSIS — M54.2 NECK PAIN: ICD-10-CM

## 2025-02-25 PROCEDURE — 99213 OFFICE O/P EST LOW 20 MIN: CPT

## 2025-02-25 NOTE — PROGRESS NOTES
The Assessment & Plan     This is a 54 y.o. male who presents for OMT follow-up for:  1. Lumbar back pain        2. Neck pain        3. Hand tingling        4. Somatic dysfunction of spine, cervical        5. Somatic dysfunction of lumbar region        6. Somatic dysfunction of thoracic region             1. Patient tolerated OMT well for the above problems,  advised patient to drink fluids and can use NSAID for soreness after treatment     2. OMT Follow up in 2 weeks.    Gurdeep Neves is a 54 y.o. male and is here for a OMT follow up. The patient reports numbness and tingiling oin both hands as well as thoracic back/rear rib tightness. He was recently seen by neurology and has an MRI ordered to evaluate symptoms.      Is the patient taking Pain medication? yes  Has the patient completed physical therapy for this condition? no  Did Patient symptoms improve from last OMT appointment? yes    The following portions of the patient's history were reviewed and updated as appropriate: allergies, current medications, past family history, past medical history, past social history, past surgical history, and problem list.    Review of Systems  Review of Systems   Musculoskeletal:  Positive for back pain and neck pain.   Neurological:  Positive for numbness (hands).         Objective     OMT Exam     OMT    Performed by: Tariq Scott DO  Authorized by: aTriq Scott DO  Universal Protocol:  procedure performed by consultantConsent: Verbal consent obtained.  Consent given by: patient  Patient understanding: patient states understanding of the procedure being performed  Patient consent: the patient's understanding of the procedure matches consent given  Test results: test results available and properly labeled  Patient identity confirmed: verbally with patient      Procedure Details:     Region evaluated and treated:  Cervical, Lumbar, Thoracic and Ribs    Thoracic Information  Thoracic Region: T10 - T12  Cervical  Details:     Examination Method:  Tissue Texture Change, Stability, Laxity, Effusions, Tone, Range of Motion, Contracture, Tenderness, Pain and Asymmetry, Misalignment, Crepitation, Defects, Masses    Osteopathic Findings:  Bilateral paraspinal hypertonicity and tenderness, bilateral trapezius hypertonicity and tenderness left > right    Treatment Method:  Counterstrain Treatment, Direct Treatment, Indirect Treatment, Muscle Energy Treatment and Soft Tissue Treatment    Response:  Improved - The somatic dysfunction is improved but not completely resolved.    Thoracic T10 - T12 details:     Examination Method:  Tissue Texture Change, Stability, Laxity, Effusions, Tone, Range of Motion, Contracture, Tenderness, Pain and Asymmetry, Misalignment, Crepitation, Defects, Masses    Severity:  Moderate    Osteopathic Findings:  Hypertonicity and tenderness left sided paraspinals    Treatment Method:  Soft Tissue Treatment and Muscle Energy Treatment    Lumbar details:     Examination Method:  Tissue Texture Change, Stability, Laxity, Effusions, Tone, Range of Motion, Contracture, Tenderness, Pain and Asymmetry, Misalignment, Crepitation, Defects, Masses    Severity:  Moderate    Osteopathic Findings:  Hypertonicity and tenderness left sided paraspinals L2-4    Treatment Method:  Myofascial Release Treatment and Soft Tissue Treatment    Response:  Improved - The somatic dysfunction is improved but not completely resolved.    Ribs details:     Examination Method:  Tissue Texture Change, Stability, Laxity, Effusions, Tone, Range of Motion, Contracture, Tenderness, Pain and Asymmetry, Misalignment, Crepitation, Defects, Masses    Severity:  Moderate    Osteopathic Findings:  Inhaled L ribs 8-9    Treatment Method:  Muscle Energy Treatment    Response:  Improved - The somatic dysfunction is improved but not completely resolved.    Total Regions Treated:  4  Attending provider present in exam room for procedure: No

## 2025-03-08 ENCOUNTER — HOSPITAL ENCOUNTER (OUTPATIENT)
Dept: MRI IMAGING | Facility: HOSPITAL | Age: 55
Discharge: HOME | End: 2025-03-08
Attending: PSYCHIATRY & NEUROLOGY
Payer: COMMERCIAL

## 2025-03-08 DIAGNOSIS — M79.2 NEUROPATHIC PAIN: ICD-10-CM

## 2025-03-08 DIAGNOSIS — R29.2 HYPERREFLEXIA: ICD-10-CM

## 2025-03-08 DIAGNOSIS — R20.0 LEFT ARM NUMBNESS: ICD-10-CM

## 2025-03-08 DIAGNOSIS — M48.02 CERVICAL STENOSIS OF SPINAL CANAL: ICD-10-CM

## 2025-03-08 PROCEDURE — 72141 MRI NECK SPINE W/O DYE: CPT

## 2025-03-18 ENCOUNTER — PROCEDURE VISIT (OUTPATIENT)
Dept: FAMILY MEDICINE CLINIC | Facility: CLINIC | Age: 55
End: 2025-03-18
Payer: COMMERCIAL

## 2025-03-18 DIAGNOSIS — M99.01 SOMATIC DYSFUNCTION OF CERVICAL REGION: ICD-10-CM

## 2025-03-18 DIAGNOSIS — R93.89 ABNORMAL MRI: ICD-10-CM

## 2025-03-18 DIAGNOSIS — M54.2 NECK PAIN: Primary | ICD-10-CM

## 2025-03-18 PROCEDURE — 98925 OSTEOPATH MANJ 1-2 REGIONS: CPT

## 2025-03-18 PROCEDURE — 99213 OFFICE O/P EST LOW 20 MIN: CPT

## 2025-03-18 NOTE — PROGRESS NOTES
The Assessment & Plan     This is a 54 y.o. male who presents for OMT follow-up for:  1. Neck pain        2. Abnormal MRI      Abnormal MRI C-spine detailed below.  Patient encouraged to follow-up with neurology regarding his findings.  OMT performed with significant caution.      3. Somatic dysfunction of cervical region             1. Patient tolerated OMT well for the above problems,  advised patient to drink fluids and can use NSAID for soreness after treatment     2. OMT Follow up in 2 weeks.    Gurdeep Neves is a 54 y.o. male and is here for a OMT follow up. The patient reports that he is still having some tingling and burning in the hands, but it has been better with gabapentin as well as having seen an improvement following last OMT session.  He recently had an MRI of cervical spine that was ordered by neurology, with multiple significant findings including atlantooccipital assimilation with basilar invagination, superior/posterior displacement of the dens relative to the occipital condyles, multilevel cervical spondylosis as well as areas of foraminal and canal stenosis.    Is the patient taking Pain medication? yes  Has the patient completed physical therapy for this condition? no  Did Patient symptoms improve from last OMT appointment? yes    The following portions of the patient's history were reviewed and updated as appropriate: allergies, current medications, past family history, past medical history, past social history, past surgical history, and problem list.    Review of Systems  Review of Systems   Musculoskeletal:  Positive for neck pain.   Neurological:  Negative for dizziness and weakness.        Parasthesias in both hands         Objective     OMT Exam     OMT    Performed by: Tariq Scott DO  Authorized by: Tariq Scott DO  Universal Protocol:  procedure performed by consultantConsent: Verbal consent obtained.  Consent given by: patient  Patient understanding: patient states  understanding of the procedure being performed  Patient identity confirmed: verbally with patient      Procedure Details:     Region evaluated and treated:  Cervical    Cervical Details:     Examination Method:  Tissue Texture Change, Stability, Laxity, Effusions, Tone, Range of Motion, Contracture, Tenderness, Pain and Asymmetry, Misalignment, Crepitation, Defects, Masses    Severity:  Moderate    Osteopathic Findings:  Hypertonicity and tenderness bilateral trapezius muscles and cervical paraspinals.  Tender point right C7 posteriorly.  Hypertonicity of bilateral SCM's.    Treatment Method:  Counterstrain Treatment, Muscle Energy Treatment and Soft Tissue Treatment    Response:  Improved - The somatic dysfunction is improved but not completely resolved.    Total Regions Treated:  1

## 2025-04-02 ENCOUNTER — PROCEDURE VISIT (OUTPATIENT)
Dept: FAMILY MEDICINE CLINIC | Facility: CLINIC | Age: 55
End: 2025-04-02
Payer: COMMERCIAL

## 2025-04-02 DIAGNOSIS — M99.02 SOMATIC DYSFUNCTION OF THORACIC REGION: ICD-10-CM

## 2025-04-02 DIAGNOSIS — M99.00 SOMATIC DYSFUNCTION OF HEAD REGION: ICD-10-CM

## 2025-04-02 DIAGNOSIS — M99.03 SOMATIC DYSFUNCTION OF LUMBAR REGION: ICD-10-CM

## 2025-04-02 DIAGNOSIS — M99.01 SOMATIC DYSFUNCTION OF SPINE, CERVICAL: Primary | ICD-10-CM

## 2025-04-02 DIAGNOSIS — M62.838 TRAPEZIUS MUSCLE SPASM: ICD-10-CM

## 2025-04-02 DIAGNOSIS — M62.830 BACK SPASM: ICD-10-CM

## 2025-04-02 DIAGNOSIS — M99.01 SOMATIC DYSFUNCTION OF CERVICAL REGION: ICD-10-CM

## 2025-04-02 PROCEDURE — 99214 OFFICE O/P EST MOD 30 MIN: CPT

## 2025-04-02 NOTE — PROGRESS NOTES
Assessment & Plan     This is a 54 y.o. male who presents for OMT follow-up for:  No diagnosis found.     1. Patient tolerated OMT well for the above problems,  advised patient to drink fluids and can use NSAID for soreness after treatment     2. OMT Follow up in 4 weeks.    Subjective     Gabriel Neves is a 54 y.o. male and is here for a OMT follow up. The patient reports that he has pain in his neck, back, and shoulders. The pain is described as tightness and is associated with numbness/tingling. It is primarily on the Left side.  He notes that use of gabapentin has improved the numb sensation. No red flag symptoms including incontinence, saddle anesthesia, weakness, or syncope    Is the patient taking Pain medication? yes  Has the patient completed physical therapy for this condition? no  Did Patient symptoms improve from last OMT appointment? yes    The following portions of the patient's history were reviewed and updated as appropriate: allergies, current medications, past family history, past medical history, past social history, past surgical history, and problem list.    Review of Systems  Review of Systems   Constitutional:  Negative for fever and unexpected weight change.   Musculoskeletal:  Positive for arthralgias, myalgias and neck pain.   Skin:  Negative for rash.   Neurological:  Positive for numbness. Negative for syncope and weakness.     Objective     OMT Exam     OMT    Performed by: Leonard Brooks DO  Authorized by: Deena Aguirre DO  Universal Protocol:  Consent given by: patient      Procedure Details:     Region evaluated and treated:  Thoracic, Lumbar and Cervical    Thoracic Information  Thoracic Region: T5 - T9 and T10 - T12  Cervical Details:     Severity:  Moderate    Osteopathic Findings:  OA restriction, hypertonic paraspinals.     Treatment Method:  Counterstrain Treatment    Thoracic T5 - T9 details:     Osteopathic Findings:  Hypertonic paraspinals. Right side parascapular  restriction with trigger point at T7.    Treatment Method:  Soft Tissue Treatment and Direct Treatment    Response:  Improved - The somatic dysfunction is improved but not completely resolved.    Thoracic T10 - T12 details:     Osteopathic Findings:  Left sided hypertonicity. Reduced spring.     Treatment Method:  Soft Tissue Treatment    Response:  Improved - The somatic dysfunction is improved but not completely resolved.    Lumbar details:     Osteopathic Findings:  Left sided hypertonicity. Appropriate spring.     Treatment Method:  Soft Tissue Treatment    Response:  Improved - The somatic dysfunction is improved but not completely resolved.    Total Regions Treated:  3

## 2025-04-20 DIAGNOSIS — E78.5 HYPERLIPIDEMIA, UNSPECIFIED HYPERLIPIDEMIA TYPE: ICD-10-CM

## 2025-04-21 RX ORDER — ATORVASTATIN CALCIUM 40 MG/1
40 TABLET, FILM COATED ORAL DAILY
Qty: 90 TABLET | Refills: 1 | Status: SHIPPED | OUTPATIENT
Start: 2025-04-21

## 2025-04-30 ENCOUNTER — TELEPHONE (OUTPATIENT)
Age: 55
End: 2025-04-30

## 2025-04-30 NOTE — TELEPHONE ENCOUNTER
Patient called to reschedule OMT. Office unavailable to assist. Patient disconnected the call while on hold. Please advise back to patient to further assist.

## 2025-05-12 DIAGNOSIS — J45.20 MILD INTERMITTENT ASTHMA WITHOUT COMPLICATION: ICD-10-CM

## 2025-05-12 DIAGNOSIS — J30.1 ALLERGIC RHINITIS DUE TO POLLEN, UNSPECIFIED SEASONALITY: ICD-10-CM

## 2025-05-12 RX ORDER — BUDESONIDE 90 UG/1
1 AEROSOL, POWDER RESPIRATORY (INHALATION) 2 TIMES DAILY
Qty: 3 EACH | Refills: 5 | Status: SHIPPED | OUTPATIENT
Start: 2025-05-12

## 2025-05-12 RX ORDER — FLUTICASONE PROPIONATE 50 MCG
1 SPRAY, SUSPENSION (ML) NASAL DAILY
Qty: 9.9 ML | Refills: 1 | Status: SHIPPED | OUTPATIENT
Start: 2025-05-12

## 2025-05-12 RX ORDER — ALBUTEROL SULFATE 90 UG/1
2 INHALANT RESPIRATORY (INHALATION) EVERY 6 HOURS PRN
Qty: 6.7 G | Refills: 5 | Status: SHIPPED | OUTPATIENT
Start: 2025-05-12

## 2025-05-23 DIAGNOSIS — M79.2 NEUROPATHIC PAIN: ICD-10-CM

## 2025-05-23 RX ORDER — GABAPENTIN 100 MG/1
100 CAPSULE ORAL 3 TIMES DAILY
Qty: 90 CAPSULE | Refills: 2 | Status: SHIPPED | OUTPATIENT
Start: 2025-05-23

## 2025-05-23 NOTE — TELEPHONE ENCOUNTER
Medication: gabapentin (Neurontin) 100 mg capsule     Dose/Frequency: Take 1 capsule (100 mg total) by mouth 3 (three) times a day     Quantity: Dispense: 90 capsule  Refills: 3 ordered    Pharmacy:  [facility]/pharmacy #[id number] [facility] [address]     Office:   [] PCP/Provider -   [x] Speciality/Provider -     Does the patient have enough for 3 days?   [] Yes   [x] No - Send as HP to POD

## 2025-05-28 ENCOUNTER — PROCEDURE VISIT (OUTPATIENT)
Dept: FAMILY MEDICINE CLINIC | Facility: CLINIC | Age: 55
End: 2025-05-28
Payer: COMMERCIAL

## 2025-05-28 DIAGNOSIS — M99.00 SOMATIC DYSFUNCTION OF HEAD REGION: ICD-10-CM

## 2025-05-28 DIAGNOSIS — M54.2 NECK PAIN: Primary | ICD-10-CM

## 2025-05-28 DIAGNOSIS — M99.01 SOMATIC DYSFUNCTION OF SPINE, CERVICAL: ICD-10-CM

## 2025-05-28 DIAGNOSIS — M99.02 SOMATIC DYSFUNCTION OF THORACIC REGION: ICD-10-CM

## 2025-05-28 PROCEDURE — 98926 OSTEOPATH MANJ 3-4 REGIONS: CPT

## 2025-05-28 PROCEDURE — 99213 OFFICE O/P EST LOW 20 MIN: CPT

## 2025-05-28 NOTE — PROGRESS NOTES
Assessment & Plan     This is a 54 y.o. male who presents for OMT follow-up for:  No diagnosis found.     1. Patient tolerated OMT well for the above problems,  advised patient to drink fluids and can use NSAID for soreness after treatment     2. OMT Follow up in 4 weeks.    Gurdeep Neves is a 54 y.o. male and is here for a OMT follow up. The patient reports pain in his neck, back, and shoulders. The pain is still described as a tightness with associated tingling in his shoulders. The pain is alleviated by gabapentin. No aggravating factors at this time.     Is the patient taking Pain medication? yes  Has the patient completed physical therapy for this condition? no  Did Patient symptoms improve from last OMT appointment? yes    The following portions of the patient's history were reviewed and updated as appropriate: allergies, current medications, past family history, past medical history, past social history, past surgical history, and problem list.    Review of Systems  Review of Systems   Constitutional:  Negative for fever.   Musculoskeletal:  Positive for back pain, myalgias and neck pain. Negative for joint swelling.   Skin:  Negative for color change and rash.   Neurological:  Positive for numbness. Negative for syncope and headaches.     Objective     OMT Exam     OMT    Performed by: Deena Aguirre DO  Authorized by: Deena Aguirre DO    Universal Protocol:  Consent: Verbal consent obtained      Procedure Details:     Region evaluated and treated:  Head, Cervical and Thoracic    Thoracic Information  Thoracic Region: T1 - T4  Head Details:     Examination Method:  Tissue Texture Change, Stability, Laxity, Effusions, Tone and Tenderness, Pain    Osteopathic Findings:  OA restriction    Treatment Method:  Myofascial Release Treatment    Response:  Improved - The somatic dysfunction is improved but not completely resolved.    Cervical Details:     Examination Method:  Asymmetry,  Misalignment, Crepitation, Defects, Masses, Tenderness, Pain and Tissue Texture Change, Stability, Laxity, Effusions, Tone    Severity:  Moderate    Osteopathic Findings:  Hypertonicity of right paraspinals. Restriction in cervical sidebending to the left.     Treatment Method:  Myofascial Release Treatment, Muscle Energy Treatment and Soft Tissue Treatment    Response:  Improved - The somatic dysfunction is improved but not completely resolved.    Thoracic T1 - T4 details:     Examination Method:  Tenderness, Pain and Tissue Texture Change, Stability, Laxity, Effusions, Tone    Severity:  Mild    Osteopathic Findings:  Hypertonic trap bilaterally. Trigger point at left inferior angle of scapula. Hypertonicity with palpable tenderness along bilateral medial scapular borders.    Treatment Method:  Soft Tissue Treatment and Myofascial Release Treatment    Response:  Improved    Total Regions Treated:  3

## 2025-05-30 ENCOUNTER — VBI (OUTPATIENT)
Dept: ADMINISTRATIVE | Facility: OTHER | Age: 55
End: 2025-05-30

## 2025-05-30 NOTE — TELEPHONE ENCOUNTER
05/30/25 11:03 AM     Chart reviewed for CRC: Colonoscopy ; nothing is submitted to the patient's insurance at this time.     Yadira Adams   [facility]

## 2025-06-25 ENCOUNTER — PROCEDURE VISIT (OUTPATIENT)
Dept: FAMILY MEDICINE CLINIC | Facility: CLINIC | Age: 55
End: 2025-06-25
Payer: COMMERCIAL

## 2025-06-25 DIAGNOSIS — M54.2 NECK PAIN: Primary | ICD-10-CM

## 2025-06-25 DIAGNOSIS — M99.02 SOMATIC DYSFUNCTION OF THORACIC REGION: ICD-10-CM

## 2025-06-25 DIAGNOSIS — M99.01 SOMATIC DYSFUNCTION OF SPINE, CERVICAL: ICD-10-CM

## 2025-06-25 PROCEDURE — 99213 OFFICE O/P EST LOW 20 MIN: CPT

## 2025-06-25 NOTE — PROGRESS NOTES
The Assessment & Plan     This is a 54 y.o. male who presents for OMT follow-up for:  1. Neck pain  OMT      2. Somatic dysfunction of spine, cervical  OMT      3. Somatic dysfunction of thoracic region  OMT           1. Patient tolerated OMT well for the above problems,  advised patient to drink fluids and can use NSAID for soreness after treatment     2. OMT Follow up in 4 weeks.    Gurdeep Neves is a 54 y.o. male and is here for a OMT follow up. The patient reports chronic neck pain.     Is the patient taking Pain medication? yes  Has the patient completed physical therapy for this condition? no  Did Patient symptoms improve from last OMT appointment? yes    The following portions of the patient's history were reviewed and updated as appropriate: allergies, current medications, past family history, past medical history, past social history, past surgical history, and problem list.    Review of Systems  Review of Systems   Musculoskeletal:  Positive for back pain and neck pain.         Objective     OMT Exam     OMT    Performed by: Lisa Kessler DO  Authorized by: Lisa Kessler DO    Universal Protocol:  procedure performed by consultantConsent: Verbal consent obtained  Risks and benefits: risks, benefits and alternatives were discussed  Consent given by: patient  Patient understanding: patient states understanding of the procedure being performed  Patient consent: the patient's understanding of the procedure matches consent given  Procedure consent: procedure consent matches procedure scheduled  Patient identity confirmed: verbally with patient      Procedure Details:     Region evaluated and treated:  Cervical and Thoracic    Thoracic Information  Thoracic Region: T5 - T9  Cervical Details:     Examination Method:  Tissue Texture Change, Stability, Laxity, Effusions, Tone and Asymmetry, Misalignment, Crepitation, Defects, Masses    Severity:  Moderate    Treatment Method:  Soft Tissue Treatment,  Myofascial Release Treatment, Muscle Energy Treatment, Indirect Treatment, Direct Treatment, High Velocity, Low Amplitude Treatment, Counterstrain Treatment and Articulatory Treatment    Response:  Improved - The somatic dysfunction is improved but not completely resolved.    Thoracic T5 - T9 details:     Examination Method:  Tissue Texture Change, Stability, Laxity, Effusions, Tone and Asymmetry, Misalignment, Crepitation, Defects, Masses    Severity:  Moderate    Treatment Method:  Soft Tissue Treatment, Myofascial Release Treatment, Indirect Treatment, Direct Treatment and High Velocity, Low Amplitude Treatment    Response:  Improved - The somatic dysfunction is improved but not completely resolved.    Total Regions Treated:  2  Attending provider present in exam room for procedure: No

## 2025-07-02 ENCOUNTER — TELEPHONE (OUTPATIENT)
Dept: NEUROLOGY | Facility: CLINIC | Age: 55
End: 2025-07-02

## 2025-07-10 ENCOUNTER — OFFICE VISIT (OUTPATIENT)
Dept: NEUROLOGY | Facility: CLINIC | Age: 55
End: 2025-07-10
Payer: COMMERCIAL

## 2025-07-10 VITALS
SYSTOLIC BLOOD PRESSURE: 132 MMHG | HEIGHT: 63 IN | BODY MASS INDEX: 26.75 KG/M2 | WEIGHT: 151 LBS | TEMPERATURE: 98.2 F | OXYGEN SATURATION: 98 % | DIASTOLIC BLOOD PRESSURE: 82 MMHG | HEART RATE: 89 BPM

## 2025-07-10 DIAGNOSIS — G95.9 CERVICAL MYELOPATHY (HCC): Primary | ICD-10-CM

## 2025-07-10 DIAGNOSIS — M79.2 NEUROPATHIC PAIN: ICD-10-CM

## 2025-07-10 PROCEDURE — 99213 OFFICE O/P EST LOW 20 MIN: CPT | Performed by: PSYCHIATRY & NEUROLOGY

## 2025-07-10 RX ORDER — GABAPENTIN 300 MG/1
300 CAPSULE ORAL
Qty: 90 CAPSULE | Refills: 3 | Status: SHIPPED | OUTPATIENT
Start: 2025-07-10

## 2025-07-16 ENCOUNTER — PROCEDURE VISIT (OUTPATIENT)
Dept: FAMILY MEDICINE CLINIC | Facility: CLINIC | Age: 55
End: 2025-07-16
Payer: COMMERCIAL

## 2025-07-16 DIAGNOSIS — M99.02 SOMATIC DYSFUNCTION OF THORACIC REGION: ICD-10-CM

## 2025-07-16 DIAGNOSIS — M54.2 NECK PAIN: Primary | ICD-10-CM

## 2025-07-16 DIAGNOSIS — M99.01 SOMATIC DYSFUNCTION OF CERVICAL REGION: ICD-10-CM

## 2025-07-16 DIAGNOSIS — M99.03 SOMATIC DYSFUNCTION OF LUMBAR REGION: ICD-10-CM

## 2025-07-16 PROCEDURE — 99213 OFFICE O/P EST LOW 20 MIN: CPT

## 2025-07-16 NOTE — PROGRESS NOTES
The Assessment & Plan     This is a 54 y.o. male who presents for OMT follow-up for:  1. Neck pain        2. Somatic dysfunction of thoracic region        3. Somatic dysfunction of cervical region        4. Somatic dysfunction of lumbar region             1. Patient tolerated OMT well for the above problems,  advised patient to drink fluids and can use NSAID for soreness after treatment     2. OMT Follow up in 4 weeks.    Gurdeep Neves is a 54 y.o. male and is here for a OMT follow up. The patient reports mid and low back pain. Similar to previous. He has been using some robaxin for the tightness, which has been helping.    Is the patient taking Pain medication? yes  Has the patient completed physical therapy for this condition? no  Did Patient symptoms improve from last OMT appointment? yes        Review of Systems  Review of Systems   Musculoskeletal:  Positive for back pain.   Neurological:  Negative for headaches.         Objective     OMT Exam     OMT    Performed by: Leonard Brooks DO  Authorized by: Leonard Brooks DO    Universal Protocol:  Consent: Verbal consent obtained  Risks and benefits: risks, benefits and alternatives were discussed  Consent given by: patient      Procedure Details:     Region evaluated and treated:  Cervical, Lumbar and Thoracic    Thoracic Information  Thoracic Region: T1 - T4, T5 - T9 and T10 - T12  Cervical Details:     Examination Method:  Tissue Texture Change, Stability, Laxity, Effusions, Tone and Tenderness, Pain    Severity:  Moderate    Treatment Method:  Soft Tissue Treatment, Myofascial Release Treatment and Direct Treatment    Response:  Improved - The somatic dysfunction is improved but not completely resolved.    Thoracic T1 - T4 details:     Examination Method:  Tissue Texture Change, Stability, Laxity, Effusions, Tone, Asymmetry, Misalignment, Crepitation, Defects, Masses and Tenderness, Pain    Severity:  Moderate    Treatment Method:  Direct  Treatment, Myofascial Release Treatment, Soft Tissue Treatment and Counterstrain Treatment    Response:  Improved    Thoracic T5 - T9 details:     Examination Method:  Tissue Texture Change, Stability, Laxity, Effusions, Tone, Asymmetry, Misalignment, Crepitation, Defects, Masses and Tenderness, Pain    Severity:  Moderate    Treatment Method:  Soft Tissue Treatment, Myofascial Release Treatment, Direct Treatment and Counterstrain Treatment    Response:  Improved - The somatic dysfunction is improved but not completely resolved.    Thoracic T10 - T12 details:     Examination Method:  Asymmetry, Misalignment, Crepitation, Defects, Masses, Tissue Texture Change, Stability, Laxity, Effusions, Tone and Tenderness, Pain    Severity:  Moderate    Treatment Method:  Direct Treatment, Soft Tissue Treatment and Counterstrain Treatment    Response:  Improved - The somatic dysfunction is improved but not completely resolved.    Lumbar details:     Examination Method:  Asymmetry, Misalignment, Crepitation, Defects, Masses, Tissue Texture Change, Stability, Laxity, Effusions, Tone and Tenderness, Pain    Severity:  Moderate    Treatment Method:  Soft Tissue Treatment and Direct Treatment    Response:  Improved - The somatic dysfunction is improved but not completely resolved.    Total Regions Treated:  3  Attending provider present in exam room for procedure: No

## 2025-07-17 DIAGNOSIS — E03.9 HYPOTHYROIDISM, UNSPECIFIED TYPE: ICD-10-CM

## 2025-07-17 DIAGNOSIS — I10 ESSENTIAL HYPERTENSION: ICD-10-CM

## 2025-07-18 RX ORDER — LEVOTHYROXINE SODIUM 25 UG/1
25 TABLET ORAL DAILY
Qty: 90 TABLET | Refills: 1 | Status: SHIPPED | OUTPATIENT
Start: 2025-07-18

## 2025-07-18 RX ORDER — LISINOPRIL 20 MG/1
20 TABLET ORAL DAILY
Qty: 90 TABLET | Refills: 1 | Status: SHIPPED | OUTPATIENT
Start: 2025-07-18 | End: 2025-07-24 | Stop reason: SDUPTHER

## 2025-07-24 ENCOUNTER — OFFICE VISIT (OUTPATIENT)
Dept: FAMILY MEDICINE CLINIC | Facility: CLINIC | Age: 55
End: 2025-07-24

## 2025-07-24 VITALS
TEMPERATURE: 97 F | WEIGHT: 152 LBS | SYSTOLIC BLOOD PRESSURE: 138 MMHG | OXYGEN SATURATION: 98 % | BODY MASS INDEX: 26.93 KG/M2 | HEIGHT: 63 IN | HEART RATE: 78 BPM | DIASTOLIC BLOOD PRESSURE: 79 MMHG

## 2025-07-24 DIAGNOSIS — M19.072 OSTEOARTHRITIS OF BOTH ANKLES, UNSPECIFIED OSTEOARTHRITIS TYPE: ICD-10-CM

## 2025-07-24 DIAGNOSIS — I10 ESSENTIAL HYPERTENSION: Primary | ICD-10-CM

## 2025-07-24 DIAGNOSIS — J30.1 ALLERGIC RHINITIS DUE TO POLLEN, UNSPECIFIED SEASONALITY: ICD-10-CM

## 2025-07-24 DIAGNOSIS — E78.5 HYPERLIPIDEMIA, UNSPECIFIED HYPERLIPIDEMIA TYPE: ICD-10-CM

## 2025-07-24 DIAGNOSIS — J45.20 MILD INTERMITTENT ASTHMA WITHOUT COMPLICATION: ICD-10-CM

## 2025-07-24 DIAGNOSIS — M19.071 OSTEOARTHRITIS OF BOTH ANKLES, UNSPECIFIED OSTEOARTHRITIS TYPE: ICD-10-CM

## 2025-07-24 DIAGNOSIS — Z01.00 ENCOUNTER FOR EYE EXAM: ICD-10-CM

## 2025-07-24 RX ORDER — FLUTICASONE PROPIONATE 50 MCG
1 SPRAY, SUSPENSION (ML) NASAL DAILY
Qty: 9.9 ML | Refills: 1 | Status: SHIPPED | OUTPATIENT
Start: 2025-07-24

## 2025-07-24 RX ORDER — BUDESONIDE 90 UG/1
1 AEROSOL, POWDER RESPIRATORY (INHALATION) 2 TIMES DAILY
Qty: 3 EACH | Refills: 5 | Status: SHIPPED | OUTPATIENT
Start: 2025-07-24

## 2025-07-24 RX ORDER — LISINOPRIL 20 MG/1
20 TABLET ORAL DAILY
Qty: 90 TABLET | Refills: 1 | Status: SHIPPED | OUTPATIENT
Start: 2025-07-24

## 2025-07-24 RX ORDER — AMLODIPINE BESYLATE 10 MG/1
10 TABLET ORAL DAILY
Qty: 90 TABLET | Refills: 2 | Status: SHIPPED | OUTPATIENT
Start: 2025-07-24

## 2025-07-24 RX ORDER — ALBUTEROL SULFATE 90 UG/1
2 INHALANT RESPIRATORY (INHALATION) EVERY 6 HOURS PRN
Qty: 6.7 G | Refills: 5 | Status: SHIPPED | OUTPATIENT
Start: 2025-07-24

## 2025-07-24 RX ORDER — ATORVASTATIN CALCIUM 40 MG/1
40 TABLET, FILM COATED ORAL DAILY
Qty: 90 TABLET | Refills: 1 | Status: SHIPPED | OUTPATIENT
Start: 2025-07-24

## 2025-07-24 RX ORDER — DOXYLAMINE SUCCINATE 25 MG/1
TABLET ORAL
Qty: 85 G | Refills: 0 | Status: SHIPPED | OUTPATIENT
Start: 2025-07-24

## 2025-07-24 NOTE — ASSESSMENT & PLAN NOTE
Refill request on Flonase.  Orders:    fluticasone (FLONASE) 50 mcg/act nasal spray; 1 spray into each nostril daily

## 2025-07-24 NOTE — ASSESSMENT & PLAN NOTE
Blood Pressure: 138/79  Current medications: lisinopril 20mg, amlodipine 10mg  Lifestyle modifications: He is going to continue cutting down on fried foods.    No associated chest pain, shortness of breath, palpitations, headaches, or leg swelling     Plan:  Medications changes: None.  At this point in time blood pressure is well-controlled.  Encourage continued lifestyle modifications.    Encourage monitoring home BP  Will follow-up in 3 months for HTN monitoring    Orders:    amLODIPine (NORVASC) 10 mg tablet; Take 1 tablet (10 mg total) by mouth daily    lisinopril (ZESTRIL) 20 mg tablet; Take 1 tablet (20 mg total) by mouth daily

## 2025-07-24 NOTE — PROGRESS NOTES
Name: Gabriel Neves      : 1970      MRN: [id number]  Encounter Provider: Dylan Dunlap MD  Encounter Date: 2025   Encounter department: [facility]  :  Assessment & Plan  Essential hypertension  Blood Pressure: 138/79  Current medications: lisinopril 20mg, amlodipine 10mg  Lifestyle modifications: He is going to continue cutting down on fried foods.    No associated chest pain, shortness of breath, palpitations, headaches, or leg swelling     Plan:  Medications changes: None.  At this point in time blood pressure is well-controlled.  Encourage continued lifestyle modifications.    Encourage monitoring home BP  Will follow-up in 3 months for HTN monitoring    Orders:    amLODIPine (NORVASC) 10 mg tablet; Take 1 tablet (10 mg total) by mouth daily    lisinopril (ZESTRIL) 20 mg tablet; Take 1 tablet (20 mg total) by mouth daily    Osteoarthritis of both ankles, unspecified osteoarthritis type  Refill requested on Voltaren.  Orders:    Menthol-Methyl Salicylate (Arthritis Hot) 10-15 % CREA; Apply topically daily at bedtime    Diclofenac Sodium (VOLTAREN) 1 %; Apply 2 g topically 4 (four) times a day For pain to affected area    Hyperlipidemia, unspecified hyperlipidemia type  Refill requested on Lipitor  Orders:    atorvastatin (LIPITOR) 40 mg tablet; Take 1 tablet (40 mg total) by mouth daily    Allergic rhinitis due to pollen, unspecified seasonality  Refill request on Flonase.  Orders:    fluticasone (FLONASE) 50 mcg/act nasal spray; 1 spray into each nostril daily    Mild intermittent asthma without complication  Refill requested on Pulmicort and albuterol  Orders:    budesonide (Pulmicort Flexhaler) 90 MCG/ACT inhaler; Inhale 1 puff 2 (two) times a day Rinse mouth after use.    albuterol (PROVENTIL HFA,VENTOLIN HFA) 90 mcg/act inhaler; Inhale 2 puffs every 6 (six) hours as needed for wheezing or shortness of breath    Encounter for eye exam  Requesting optometry referral. Will  "place.   Orders:    Ambulatory Referral to Optometry; Future           History of Present Illness   The patient is a 54 year old male with PMH of HTN, hypothyroidism, asthma coming in for follow up of blood pressure.     He takes blood pressure every week that is typically 130-140 systolic. Today in office manual /79.    Diet:   Proteins: fish, shrimp, chicken, but often fried. Is looking to cut down on fried foods and replace with steamed foods.   Vegetables: carrots, celery, spinach, greens, cabbage, black beans. Fruit: apple, orange.  He does endorse eating plenty of vegetables and fruit.    Exercise:   Cycling, jumping jacks.         Review of Systems   Constitutional:  Negative for chills and fever.   Respiratory:  Negative for shortness of breath.    Cardiovascular:  Negative for chest pain.       Objective   /89 (BP Location: Left arm, Patient Position: Sitting, Cuff Size: Standard)   Pulse 78   Temp (!) 97 °F (36.1 °C) (Tympanic)   Ht 5' 3\" (1.6 m)   Wt 68.9 kg (152 lb)   SpO2 98%   BMI 26.93 kg/m²      Physical Exam  Vitals reviewed.   Constitutional:       General: He is not in acute distress.     Appearance: He is well-developed. He is not ill-appearing or diaphoretic.   HENT:      Head: Normocephalic and atraumatic.     Eyes:      General:         Right eye: No discharge.         Left eye: No discharge.      Conjunctiva/sclera: Conjunctivae normal.       Cardiovascular:      Rate and Rhythm: Normal rate and regular rhythm.      Heart sounds: No murmur heard.  Pulmonary:      Effort: Pulmonary effort is normal. No respiratory distress.      Breath sounds: Normal breath sounds. No wheezing.   Abdominal:      Palpations: Abdomen is soft.      Tenderness: There is no abdominal tenderness.     Musculoskeletal:      Right lower leg: No edema.      Left lower leg: No edema.     Skin:     General: Skin is warm and dry.      Findings: No lesion.     Neurological:      Mental Status: He is alert " and oriented to person, place, and time.     Psychiatric:         Mood and Affect: Mood normal.

## 2025-07-24 NOTE — ASSESSMENT & PLAN NOTE
Refill requested on Pulmicort and albuterol  Orders:    budesonide (Pulmicort Flexhaler) 90 MCG/ACT inhaler; Inhale 1 puff 2 (two) times a day Rinse mouth after use.    albuterol (PROVENTIL HFA,VENTOLIN HFA) 90 mcg/act inhaler; Inhale 2 puffs every 6 (six) hours as needed for wheezing or shortness of breath

## 2025-07-24 NOTE — ASSESSMENT & PLAN NOTE
Refill requested on Voltaren.  Orders:    Menthol-Methyl Salicylate (Arthritis Hot) 10-15 % CREA; Apply topically daily at bedtime    Diclofenac Sodium (VOLTAREN) 1 %; Apply 2 g topically 4 (four) times a day For pain to affected area

## 2025-07-24 NOTE — ASSESSMENT & PLAN NOTE
Refill requested on Lipitor  Orders:    atorvastatin (LIPITOR) 40 mg tablet; Take 1 tablet (40 mg total) by mouth daily

## 2025-08-13 ENCOUNTER — PROCEDURE VISIT (OUTPATIENT)
Dept: FAMILY MEDICINE CLINIC | Facility: CLINIC | Age: 55
End: 2025-08-13
Payer: COMMERCIAL